# Patient Record
Sex: FEMALE | Race: BLACK OR AFRICAN AMERICAN | NOT HISPANIC OR LATINO | Employment: UNEMPLOYED
[De-identification: names, ages, dates, MRNs, and addresses within clinical notes are randomized per-mention and may not be internally consistent; named-entity substitution may affect disease eponyms.]

---

## 2017-06-24 ENCOUNTER — HEALTH MAINTENANCE LETTER (OUTPATIENT)
Age: 37
End: 2017-06-24

## 2017-08-23 ENCOUNTER — TRANSFERRED RECORDS (OUTPATIENT)
Dept: HEALTH INFORMATION MANAGEMENT | Facility: CLINIC | Age: 37
End: 2017-08-23

## 2017-10-17 ENCOUNTER — HOSPITAL ENCOUNTER (INPATIENT)
Facility: CLINIC | Age: 37
LOS: 3 days | Discharge: LEFT BEFORE DISCHARGE | End: 2017-10-21
Attending: EMERGENCY MEDICINE | Admitting: PSYCHIATRY & NEUROLOGY
Payer: MEDICAID

## 2017-10-17 DIAGNOSIS — T78.00XD ANAPHYLACTIC SHOCK DUE TO FOOD, SUBSEQUENT ENCOUNTER: Primary | ICD-10-CM

## 2017-10-17 DIAGNOSIS — F11.20 OPIOID USE DISORDER, SEVERE, DEPENDENCE (H): ICD-10-CM

## 2017-10-17 DIAGNOSIS — F11.23 OPIOID DEPENDENCE WITH WITHDRAWAL (H): ICD-10-CM

## 2017-10-17 DIAGNOSIS — F10.220 ACUTE ALCOHOLIC INTOXICATION IN ALCOHOLISM WITHOUT COMPLICATION (H): ICD-10-CM

## 2017-10-17 LAB
ALCOHOL BREATH TEST: 0.12 (ref 0–0.01)
BASOPHILS # BLD AUTO: 0 10E9/L (ref 0–0.2)
BASOPHILS NFR BLD AUTO: 0.2 %
DIFFERENTIAL METHOD BLD: ABNORMAL
EOSINOPHIL # BLD AUTO: 0 10E9/L (ref 0–0.7)
EOSINOPHIL NFR BLD AUTO: 0.2 %
ERYTHROCYTE [DISTWIDTH] IN BLOOD BY AUTOMATED COUNT: 17.3 % (ref 10–15)
HCT VFR BLD AUTO: 32.7 % (ref 35–47)
HGB BLD-MCNC: 10.5 G/DL (ref 11.7–15.7)
IMM GRANULOCYTES # BLD: 0 10E9/L (ref 0–0.4)
IMM GRANULOCYTES NFR BLD: 0.2 %
LYMPHOCYTES # BLD AUTO: 3.7 10E9/L (ref 0.8–5.3)
LYMPHOCYTES NFR BLD AUTO: 36 %
MCH RBC QN AUTO: 26.3 PG (ref 26.5–33)
MCHC RBC AUTO-ENTMCNC: 32.1 G/DL (ref 31.5–36.5)
MCV RBC AUTO: 82 FL (ref 78–100)
MONOCYTES # BLD AUTO: 0.6 10E9/L (ref 0–1.3)
MONOCYTES NFR BLD AUTO: 5.7 %
NEUTROPHILS # BLD AUTO: 6 10E9/L (ref 1.6–8.3)
NEUTROPHILS NFR BLD AUTO: 57.7 %
NRBC # BLD AUTO: 0 10*3/UL
NRBC BLD AUTO-RTO: 0 /100
PLATELET # BLD AUTO: 365 10E9/L (ref 150–450)
RBC # BLD AUTO: 3.99 10E12/L (ref 3.8–5.2)
WBC # BLD AUTO: 10.4 10E9/L (ref 4–11)

## 2017-10-17 PROCEDURE — 80053 COMPREHEN METABOLIC PANEL: CPT | Performed by: EMERGENCY MEDICINE

## 2017-10-17 PROCEDURE — 83690 ASSAY OF LIPASE: CPT | Performed by: EMERGENCY MEDICINE

## 2017-10-17 PROCEDURE — 96375 TX/PRO/DX INJ NEW DRUG ADDON: CPT | Performed by: EMERGENCY MEDICINE

## 2017-10-17 PROCEDURE — 25000128 H RX IP 250 OP 636: Performed by: EMERGENCY MEDICINE

## 2017-10-17 PROCEDURE — 85025 COMPLETE CBC W/AUTO DIFF WBC: CPT | Performed by: EMERGENCY MEDICINE

## 2017-10-17 PROCEDURE — 99285 EMERGENCY DEPT VISIT HI MDM: CPT | Mod: 25 | Performed by: EMERGENCY MEDICINE

## 2017-10-17 PROCEDURE — 96374 THER/PROPH/DIAG INJ IV PUSH: CPT | Performed by: EMERGENCY MEDICINE

## 2017-10-17 PROCEDURE — HZ2ZZZZ DETOXIFICATION SERVICES FOR SUBSTANCE ABUSE TREATMENT: ICD-10-PCS | Performed by: PSYCHIATRY & NEUROLOGY

## 2017-10-17 PROCEDURE — 99285 EMERGENCY DEPT VISIT HI MDM: CPT | Mod: Z6 | Performed by: EMERGENCY MEDICINE

## 2017-10-17 PROCEDURE — 82075 ASSAY OF BREATH ETHANOL: CPT | Performed by: EMERGENCY MEDICINE

## 2017-10-17 RX ORDER — KETOROLAC TROMETHAMINE 30 MG/ML
30 INJECTION, SOLUTION INTRAMUSCULAR; INTRAVENOUS ONCE
Status: COMPLETED | OUTPATIENT
Start: 2017-10-17 | End: 2017-10-17

## 2017-10-17 RX ORDER — DIAZEPAM 10 MG/2ML
5 INJECTION, SOLUTION INTRAMUSCULAR; INTRAVENOUS ONCE
Status: DISCONTINUED | OUTPATIENT
Start: 2017-10-17 | End: 2017-10-18

## 2017-10-17 RX ORDER — ONDANSETRON 2 MG/ML
4 INJECTION INTRAMUSCULAR; INTRAVENOUS ONCE
Status: COMPLETED | OUTPATIENT
Start: 2017-10-17 | End: 2017-10-17

## 2017-10-17 RX ADMIN — ONDANSETRON 4 MG: 2 INJECTION INTRAMUSCULAR; INTRAVENOUS at 23:36

## 2017-10-17 RX ADMIN — KETOROLAC TROMETHAMINE 30 MG: 30 INJECTION, SOLUTION INTRAMUSCULAR at 23:36

## 2017-10-18 PROBLEM — F19.20 CHEMICAL DEPENDENCY (H): Status: ACTIVE | Noted: 2017-10-18

## 2017-10-18 LAB
ALBUMIN SERPL-MCNC: 3.2 G/DL (ref 3.4–5)
ALP SERPL-CCNC: 58 U/L (ref 40–150)
ALT SERPL W P-5'-P-CCNC: 28 U/L (ref 0–50)
AMPHETAMINES UR QL SCN: NEGATIVE
ANION GAP SERPL CALCULATED.3IONS-SCNC: 8 MMOL/L (ref 3–14)
AST SERPL W P-5'-P-CCNC: 32 U/L (ref 0–45)
BARBITURATES UR QL: NEGATIVE
BENZODIAZ UR QL: NEGATIVE
BILIRUB SERPL-MCNC: 0.1 MG/DL (ref 0.2–1.3)
BUN SERPL-MCNC: 6 MG/DL (ref 7–30)
CALCIUM SERPL-MCNC: 8.6 MG/DL (ref 8.5–10.1)
CANNABINOIDS UR QL SCN: NEGATIVE
CHLORIDE SERPL-SCNC: 109 MMOL/L (ref 94–109)
CO2 SERPL-SCNC: 29 MMOL/L (ref 20–32)
COCAINE UR QL: POSITIVE
CREAT SERPL-MCNC: 0.72 MG/DL (ref 0.52–1.04)
ETHANOL UR QL SCN: POSITIVE
GFR SERPL CREATININE-BSD FRML MDRD: >90 ML/MIN/1.7M2
GLUCOSE SERPL-MCNC: 71 MG/DL (ref 70–99)
LIPASE SERPL-CCNC: 446 U/L (ref 73–393)
OPIATES UR QL SCN: POSITIVE
POTASSIUM SERPL-SCNC: 3.5 MMOL/L (ref 3.4–5.3)
PROT SERPL-MCNC: 6.8 G/DL (ref 6.8–8.8)
SODIUM SERPL-SCNC: 146 MMOL/L (ref 133–144)

## 2017-10-18 PROCEDURE — 99222 1ST HOSP IP/OBS MODERATE 55: CPT | Mod: AI | Performed by: PSYCHIATRY & NEUROLOGY

## 2017-10-18 PROCEDURE — 25000132 ZZH RX MED GY IP 250 OP 250 PS 637: Performed by: PSYCHIATRY & NEUROLOGY

## 2017-10-18 PROCEDURE — 99232 SBSQ HOSP IP/OBS MODERATE 35: CPT | Performed by: PHYSICIAN ASSISTANT

## 2017-10-18 PROCEDURE — 12800012 ZZH R&B CD MH INTERMEDIATE ADULT

## 2017-10-18 PROCEDURE — 80320 DRUG SCREEN QUANTALCOHOLS: CPT | Performed by: EMERGENCY MEDICINE

## 2017-10-18 PROCEDURE — 99207 ZZC CONSULT E&M CHANGED TO SUBSEQUENT LEVEL: CPT | Performed by: PHYSICIAN ASSISTANT

## 2017-10-18 PROCEDURE — 25000132 ZZH RX MED GY IP 250 OP 250 PS 637: Performed by: EMERGENCY MEDICINE

## 2017-10-18 PROCEDURE — 25000132 ZZH RX MED GY IP 250 OP 250 PS 637: Performed by: PHYSICIAN ASSISTANT

## 2017-10-18 PROCEDURE — 80307 DRUG TEST PRSMV CHEM ANLYZR: CPT | Performed by: EMERGENCY MEDICINE

## 2017-10-18 RX ORDER — AMLODIPINE BESYLATE 10 MG/1
10 TABLET ORAL DAILY
Status: DISCONTINUED | OUTPATIENT
Start: 2017-10-18 | End: 2017-10-22 | Stop reason: HOSPADM

## 2017-10-18 RX ORDER — NALOXONE HYDROCHLORIDE 0.4 MG/ML
.1-.4 INJECTION, SOLUTION INTRAMUSCULAR; INTRAVENOUS; SUBCUTANEOUS
Status: DISCONTINUED | OUTPATIENT
Start: 2017-10-18 | End: 2017-10-22 | Stop reason: HOSPADM

## 2017-10-18 RX ORDER — METOPROLOL TARTRATE 100 MG
100 TABLET ORAL 2 TIMES DAILY
Status: DISCONTINUED | OUTPATIENT
Start: 2017-10-18 | End: 2017-10-18

## 2017-10-18 RX ORDER — LABETALOL 100 MG/1
200 TABLET, FILM COATED ORAL EVERY 12 HOURS SCHEDULED
Status: DISCONTINUED | OUTPATIENT
Start: 2017-10-18 | End: 2017-10-18

## 2017-10-18 RX ORDER — ACETAMINOPHEN 500 MG
1000 TABLET ORAL EVERY 6 HOURS PRN
Status: DISCONTINUED | OUTPATIENT
Start: 2017-10-18 | End: 2017-10-22 | Stop reason: HOSPADM

## 2017-10-18 RX ORDER — HYDROXYZINE HYDROCHLORIDE 25 MG/1
25-50 TABLET, FILM COATED ORAL EVERY 4 HOURS PRN
Status: DISCONTINUED | OUTPATIENT
Start: 2017-10-18 | End: 2017-10-22 | Stop reason: HOSPADM

## 2017-10-18 RX ORDER — CLONIDINE HYDROCHLORIDE 0.1 MG/1
0.1 TABLET ORAL 3 TIMES DAILY
Status: DISCONTINUED | OUTPATIENT
Start: 2017-10-18 | End: 2017-10-19

## 2017-10-18 RX ORDER — BUPRENORPHINE 2 MG/1
2 TABLET SUBLINGUAL 4 TIMES DAILY PRN
Status: DISCONTINUED | OUTPATIENT
Start: 2017-10-18 | End: 2017-10-19

## 2017-10-18 RX ORDER — DIAZEPAM 5 MG
5-20 TABLET ORAL EVERY 30 MIN PRN
Status: DISCONTINUED | OUTPATIENT
Start: 2017-10-18 | End: 2017-10-22 | Stop reason: HOSPADM

## 2017-10-18 RX ORDER — MULTIPLE VITAMINS W/ MINERALS TAB 9MG-400MCG
1 TAB ORAL DAILY
Status: DISCONTINUED | OUTPATIENT
Start: 2017-10-18 | End: 2017-10-22 | Stop reason: HOSPADM

## 2017-10-18 RX ORDER — QUETIAPINE FUMARATE 50 MG/1
50 TABLET, EXTENDED RELEASE ORAL AT BEDTIME
Status: DISCONTINUED | OUTPATIENT
Start: 2017-10-18 | End: 2017-10-20 | Stop reason: ALTCHOICE

## 2017-10-18 RX ORDER — FOLIC ACID 1 MG/1
1 TABLET ORAL DAILY
Status: DISCONTINUED | OUTPATIENT
Start: 2017-10-18 | End: 2017-10-22 | Stop reason: HOSPADM

## 2017-10-18 RX ORDER — CLONIDINE HYDROCHLORIDE 0.1 MG/1
0.1 TABLET ORAL 2 TIMES DAILY
Status: DISCONTINUED | OUTPATIENT
Start: 2017-10-18 | End: 2017-10-18

## 2017-10-18 RX ORDER — NIFEDIPINE 30 MG/1
30 TABLET, EXTENDED RELEASE ORAL DAILY
Status: DISCONTINUED | OUTPATIENT
Start: 2017-10-18 | End: 2017-10-18

## 2017-10-18 RX ORDER — POLYETHYLENE GLYCOL 3350 17 G/17G
17 POWDER, FOR SOLUTION ORAL DAILY PRN
Status: DISCONTINUED | OUTPATIENT
Start: 2017-10-18 | End: 2017-10-22 | Stop reason: HOSPADM

## 2017-10-18 RX ORDER — LANOLIN ALCOHOL/MO/W.PET/CERES
100 CREAM (GRAM) TOPICAL DAILY
Status: COMPLETED | OUTPATIENT
Start: 2017-10-18 | End: 2017-10-20

## 2017-10-18 RX ADMIN — MULTIPLE VITAMINS W/ MINERALS TAB 1 TABLET: TAB at 08:24

## 2017-10-18 RX ADMIN — AMLODIPINE BESYLATE 10 MG: 10 TABLET ORAL at 12:51

## 2017-10-18 RX ADMIN — CLONIDINE HYDROCHLORIDE 0.1 MG: 0.1 TABLET ORAL at 17:13

## 2017-10-18 RX ADMIN — DIAZEPAM 10 MG: 5 TABLET ORAL at 08:24

## 2017-10-18 RX ADMIN — Medication 100 MG: at 08:24

## 2017-10-18 RX ADMIN — FOLIC ACID 1 MG: 1 TABLET ORAL at 08:24

## 2017-10-18 RX ADMIN — DIAZEPAM 10 MG: 5 TABLET ORAL at 04:48

## 2017-10-18 RX ADMIN — METOPROLOL TARTRATE 100 MG: 100 TABLET, FILM COATED ORAL at 08:24

## 2017-10-18 RX ADMIN — CLONIDINE HYDROCHLORIDE 0.1 MG: 0.1 TABLET ORAL at 20:31

## 2017-10-18 RX ADMIN — QUETIAPINE FUMARATE 50 MG: 50 TABLET, EXTENDED RELEASE ORAL at 20:52

## 2017-10-18 RX ADMIN — DIAZEPAM 10 MG: 5 TABLET ORAL at 12:51

## 2017-10-18 RX ADMIN — DIAZEPAM 10 MG: 5 TABLET ORAL at 16:36

## 2017-10-18 ASSESSMENT — ACTIVITIES OF DAILY LIVING (ADL)
ORAL_HYGIENE: INDEPENDENT
DRESS: SCRUBS (BEHAVIORAL HEALTH)
GROOMING: INDEPENDENT
DRESS: SCRUBS (BEHAVIORAL HEALTH)
GROOMING: INDEPENDENT
LAUNDRY: WITH SUPERVISION
ORAL_HYGIENE: INDEPENDENT
LAUNDRY: WITH SUPERVISION

## 2017-10-18 ASSESSMENT — ENCOUNTER SYMPTOMS
NAUSEA: 1
HEADACHES: 1
CHILLS: 0
RESPIRATORY NEGATIVE: 1
FEVER: 0

## 2017-10-18 NOTE — H&P
IDENTIFYING INFORMATION:  Elaina Lin is a 36-year-old -American female, homeless, unemployed.  She has 5 children in foster care.      CHIEF COMPLAINT:  Heroin.      HISTORY OF PRESENT ILLNESS:  The patient is an extremely poor historian, falls asleep, made 2 attempts to talk with her but she is not able to provide.  She gets irritable when asked for details.  The patient came to the emergency room wanting help.  She has been using alcohol and heroin.  She has been saying this is a 10-year history.  She has tolerance to both of them, withdrawal from them, but no history of DTs or seizures.  Progressive use with loss of control, use despite negative consequences, health, money.      The patient has bipolar.  When she gets manic she cannot sleep.  She does not have any suicidal ideation.  She does not have any auditory or visual hallucinations, does not have any psychosis.      PAST PSYCHIATRIC HISTORY:  Psychiatrically hospitalized once in 2009 after a suicide attempt.  This was hospitalization in [facility].  Patient was in 2 chemical dependency treatments.  In 2014 was ARF.  The patient has previously been on Seroquel and Lexapro.      Vitals are as below.  Temperature of 98.4, pulse of 83, respiratory rate of 16, blood pressure 164/713.      REVIEW OF SYSTEMS:  Unable to do 10-point review of systems as the patient is very sedated.      FAMILY HISTORY:  Mother has schizophrenia.      SOCIAL HISTORY:  Homeless, unemployed.      MENTAL STATUS EXAMINATION:  The patient is a 36-year-old -American female lying in bed, poor historian, met with her twice but not able to provide, very sleepy.  She has poor grooming, poor hygiene, poor eye contact.  Psychomotor retardation seen.  Mood is irritable.  Affect is congruent.  Speech is less spontaneous, reduced in volume, less logical in thinking, no loose association.  Insight and judgment are limited.  Does not have any suicidal or homicidal ideation,  denied auditory hallucinations.  Alert and oriented x3 but recent and remote memory, language, fund of knowledge are less than adequate.      DIAGNOSES:   Axis I:  Alcohol use disorder, severe.    Opiate use disorder, severe.      PLAN:  The patient will be detoxed off alcohol using MSSA protocol on Valium.  The patient will be detoxed off opiates using buprenorphine.  Will be started on Seroquel 50 mg at bedtime.  The patient at this time does not have any suicidal or homicidal ideation, plan or intent.  She does not know what she wants to do for treatment.  She will be seen by case management and Internal Medicine.         CARINA RUIZ MD             D: 10/18/2017 10:57   T: 10/18/2017 11:13   MT: MAIA      Name:     RENATE MENEZES   MRN:      [id number]        Account:      [id number]   :      1980           Admitted:     [id number]      Document: [id number]

## 2017-10-18 NOTE — PROGRESS NOTES
BIN # 1: Purse with personal stuff, Back pack with clothing, loose clothing and shoes    BIN #2: winter coat, underwear, socks, 2 t-shirts, jeans, boxer shorts, white belt, bag with hair products and tampax and pads, purse, Ziploc with scissors     LOCKED DRAWER:  Phone and chargers    SECURITY ENV # [id number]  -  2 [place] Drivers License    SECURITY ENV # [id number]- meds    A               Admission:  I am responsible for any personal items that are not sent to the safe or pharmacy.  [facility] is not responsible for loss, theft or damage of any property in my possession.    Signature:  _________________________________ Date: _______  Time: _____                                              Staff Signature:  ____________________________ Date: ________  Time: _____      2nd Staff person, if patient is unable/unwilling to sign:    Signature: ________________________________ Date: ________  Time: _____     Discharge:  [facility] has returned all of my personal belongings:    Signature: _________________________________ Date: ________  Time: _____                                          Staff Signature:  ____________________________ Date: ________  Time: _____

## 2017-10-18 NOTE — CONSULTS
Internal Medicine Initial Visit    Elaina Lin MRN# [id number]   Age: 36 year old YOB: 1980   Date of Admission: 10/17/2017     Reason for consult: Co-Management- elevated BP       Requesting physician Dr. Castro      SUBJECTIVE   HPI:   Elaina Lin is a 36 year old female with history of polysubstance abuse, alcohol abuse, pancreatitis, and HTN admitted to station  3A for detox from alcohol and heroin.    Per EMR review - Currently drinking 1/2L of vodka daily in addition to 1g IV heroin daily with occasional use of pills when there is no access to heroin. She has a history of pancreatitis, but lipase is almost normal today. She has been non-compliant with her HTN medications PTA.    Patient declined my visit, unable to complete ROS or PE.     Past Medical History:     Past Medical History:   Diagnosis Date     Genital herpes 2012     IMO update changed this record. Please review for accuracy     H/O: substance abuse 2013     Postpartum depression 10/21/2011     Recur major depress, severe 2012     Substance abuse         Past Surgical History:      Past Surgical History:   Procedure Laterality Date      SECTION  2011 and 13     HERNIA REPAIR       REPAIR TENDON HAND             Medications prior to admission:     Prior to Admission Medications   Prescriptions Last Dose Informant Patient Reported? Taking?   BusPIRone HCl (BUSPAR PO) More than a month at Unknown time  Yes No   Sig: Patient unsure of dosage   Divalproex Sodium (DEPAKOTE PO) More than a month at Unknown time  Yes No   Si mg in the AM and 500 mg at night   NIFEdipine osmotic (PROCARDIA XL) 30 MG 24 hr tablet More than a month at Unknown time  No No   Sig: Take 1 tablet (30 mg) by mouth daily   QUEtiapine (SEROQUEL) 100 MG tablet More than a month at Unknown time  Yes No   Sig: Take by mouth At Bedtime   medroxyPROGESTERone (DEPO-PROVERA) 150 MG/ML injection   No No   Sig: Inject 1 mL (150  "mg) into the muscle every 3 months   metoprolol (LOPRESSOR) 100 MG tablet More than a month at Unknown time  No No   Sig: Take 1 tablet (100 mg) by mouth 2 times daily   metroNIDAZOLE (FLAGYL) 500 MG tablet Unknown at Unknown time  No No   Sig: Take 1 tablet (500 mg) by mouth 2 times daily      Facility-Administered Medications: None         Allergies:     Allergies   Allergen Reactions     Compazine [Prochlorperazine]          Social History:   Did not discuss.     Family History:     Family History   Problem Relation Age of Onset     Asthma Mother      DIABETES Mother      Allergies Mother      Psychotic Disorder Mother      schitzophrenic     DIABETES Maternal Grandmother      HEART DISEASE Maternal Grandmother      triple bypass     Eye Disorder Maternal Grandmother      cataracts     Hypertension Maternal Grandmother      Unknown/Adopted Maternal Grandfather      Unknown/Adopted Paternal Grandmother      Unknown/Adopted Paternal Grandfather      CANCER Sister      leukemia        Reviewed & updated in Epic.     Review of Systems:   Ten point review of systems negative except as stated above in History of Present Illness.    OBJECTIVE   Physical Exam:   Blood pressure (!) 164/113, pulse 83, temperature 98.4  F (36.9  C), temperature source Tympanic, resp. rate 16, height 1.575 m (5' 2\"), weight 61.2 kg (135 lb), last menstrual period 10/17/2017, SpO2 96 %, not currently breastfeeding.  General: Sleepy, agitated. NAD.   Refused exam.     Laboratory Data:   CMP  Recent Labs  Lab 10/17/17  2326   *   POTASSIUM 3.5   CHLORIDE 109   CO2 29   ANIONGAP 8   GLC 71   BUN 6*   CR 0.72   GFRESTIMATED >90   GFRESTBLACK >90   JOHANNY 8.6   PROTTOTAL 6.8   ALBUMIN 3.2*   BILITOTAL 0.1*   ALKPHOS 58   AST 32   ALT 28       CBC  Recent Labs  Lab 10/17/17  2326   WBC 10.4   RBC 3.99   HGB 10.5*   HCT 32.7*   MCV 82   MCH 26.3*   MCHC 32.1   RDW 17.3*          TSHNo lab results found in last 7 days.       Assessment and " Plan/Recommendations:     Polysubstance Abuse, Alcohol Abuse, Withdrawal. Per EMR review, using IV heroin or narcotic pills, and alcohol daily.   - Continue MSSA protocol  - Continue MVI, thiamine folate  - Most recent Hep and HIV testing in late September was negative, consider repeating - will defer to Psych    HTN. Non-compliant with PTA meds per report from RN. BP elevated in setting of non-compliance and acute withdrawal. Labetalol is not recommended in patients with cocaine use, which per RN, patient uses on a regular basis.  - Continue PTA Norvasc 10mg daily  - D/c labetolol since patient using cocaine  - Clonidine 0.1mg BID in setting of HTN and withdrawal  - Monitor BP closely     Medicine will follow BP peripherally. Please page the Internal Medicine job code pager for any intercurrent medical issues which arise. Thank you for the opportunity to be a part of this patient's care.    Rolf Ahumada PA-C  Hospitalist Service  [phone]

## 2017-10-18 NOTE — PROGRESS NOTES
"CLINICAL NUTRITION SERVICES - ASSESSMENT NOTE     Nutrition Prescription    RECOMMENDATIONS FOR MDs/PROVIDERS TO ORDER:  None today    Malnutrition Status:    Unable to determine    Recommendations already ordered by Registered Dietitian (RD):  Weekly weight checks    Future/Additional Recommendations:  If pt continues to lose weight/PO intakes do not improve, order nutrition supplement       REASON FOR ASSESSMENT  Elaina Lin is a/an 36 year old female assessed by the dietitian for Admission Nutrition Risk Screen for unintentional loss of 10# or more in the past two months    NUTRITION HISTORY  - Pt was sleeping x2 attempts and was unable to be woken up.  - Per chart review, she has been drinking 1/2 liter of vodka a day.    CURRENT NUTRITION ORDERS  Diet: Regular  Intake/Tolerance: per unit staff, pt was present at meals but amounts eaten is unknown    LABS  Labs reviewed    MEDICATIONS  Medications reviewed    ANTHROPOMETRICS  Height: 157.5 cm (5' 2\")  Most Recent Weight: 61.2 kg (135 lb)    IBW: 50 kg (122% IBW)  BMI: Normal BMI  Weight History: pt has lost 9 lbs (6%) over the last 2 months.   Wt Readings from Last 10 Encounters:  10/18/17 : 61.2 kg (135 lb)  10/01/17 : 61.2 kg (135 lb)   -- OSH  08/07/17 : 65.3 kg (144 lb)   -- OSH  01/25/17 : 63 kg (139 lb)  -- OSH  12/19/16 : 66.7 kg (147 lb)   -- OSH    Dosing Weight: 53 kg - adjusted from most recent wt    ASSESSED NUTRITION NEEDS  Estimated Energy Needs: 7186-2674 kcals/day (25 - 30 kcals/kg)  Justification: Maintenance  Estimated Protein Needs: 53-64 grams protein/day (1 - 1.2 grams of pro/kg)  Justification: Maintenance  Estimated Fluid Needs: 1 mL/kcal  Justification: Maintenance    PHYSICAL FINDINGS  See malnutrition section below.    MALNUTRITION  % Intake: Unable to assess   % Weight Loss: Up to 7.5% in 3 months (non-severe)  Subcutaneous Fat Loss: Unable to assess - pt covered with blanket  Muscle Loss: Unable to assess - pt covered with " blanket  Fluid Accumulation/Edema: None noted in chart  Malnutrition Diagnosis: Unable to determine    NUTRITION DIAGNOSIS  Inadequate oral intake related to variable appetite 2/2 polysubstance abuse as evidenced by pt had a 6% wt loss over the last 2 months    INTERVENTIONS  Implementation  Nutrition Education: Not appropriate at this time due to patient condition     Goals  Patient to consume % of nutritionally adequate meal trays TID, or the equivalent with supplements/snacks.     Monitoring/Evaluation  Progress toward goals will be monitored and evaluated per protocol.      Sharon Hollins RD, LD  Unit Pager: [phone]

## 2017-10-18 NOTE — PROGRESS NOTES
Met with Pt to initiate discharge planning.  Pt had a recent Rule 25 at [facility].  PT signed OLYA and request for Rule 25 was faxed.  Pt will require [facility] funding for treatment.

## 2017-10-18 NOTE — PLAN OF CARE
Problem: Substance Withdrawal  Goal: Substance Withdrawal  Signs and symptoms of listed problems will be absent or manageable. 1) Patient will achieve medical stabilization of acute withdrawal sx.  2) Patient will remain safe and free from injury  3) Patient will demonstrate improvement of ADLs (appetite, hygiene)  4) Verbalize reduction of fear or anxiety to a manageable level.  5) Verbalize knowledge of substance abuse as a disease  6) Verbalize risks and negative effects related to drug ingestion  7) Demonstrate participation in unit programming and attends specific substance use group therapy (i.e AA meetings)  8) Accept referral to substance abuse treatment  9) Express sense of regaining some control of situation/life (possible by verbalizing alternative coping mechanisms as alternatives to substance use in response to stress)  Outcome: Declining  Pt being monitored for opiate and alcohol withdrawal. Pt  Has been in bed much of day shift. Pt out for meals and vital signs only. Affect flat. Mood is irritable. Pt marginally cooperative with detox assessments. Pt medicated x 2 with valium 10 mg for alcohol withdrawal. See order to start buprenorphine for opiate withdrawal. Pt states she last used opiates yesterday at 2100.   BP elevated. Pt reports her BP medications are different than what was ordered. Medical aware of BP and Medication issue. See new medication orders. Pt states she had a Rule 25 done at [facility] a few months ago. States she wants to attend a treatment program that would have a bed asap.   Pt reports she was using cocaine on a regular basis.

## 2017-10-18 NOTE — PLAN OF CARE
Problem: Substance Withdrawal  Goal: Substance Withdrawal  Signs and symptoms of listed problems will be absent or manageable.   Outcome: No Change  Pt admitted voluntarily to Stn 3A seeking detoxification from alcohol, and possible CD treatment thereafter. Reports this is her first admission here, reports several failed CD treatments. Drug of choice are alcohol and heroin which she uses daily, with last use several hours prior to presentation in the ED. Medical concerns of HTN and pancreatitis. Denies hx of seizures/TD's with withdrawals. Hx of one SA via overdose, denies current suicidal ideation and has verbally contracted for safety on the unit. Pt  identifying stressors as;  in [facility], kids in foster care, and homelessness.      Pt presents as flat and blunted with a tense affect. Fair insight into chem dep concerns. Mood irritable, noted to be restless, fidgety and tremulous. Had one medium emesis during admission interview. MSSA upon arrival to unit 12, pt  medicated with 10 mg of valium. Pt briefly oriented to unit, and room. Currently in bed. Nursing will continue to monitor. Admission profiles needs to be completed and PTA medications reconciled and ordered.

## 2017-10-18 NOTE — ED NOTES
Patient offered Tylenol per MD and patient declined and stated she wanted something else. Told that she can't have any stronger pain med due to being under the influence of alcohol. Stated that she then wanted to leave. MD notified.

## 2017-10-18 NOTE — PLAN OF CARE
Problem: General Plan of Care (Inpatient Behavioral)  Goal: Individualization/Patient Specific Goal (IP Behavioral)  The patient and/or their representative will achieve their patient-specific goals related to the plan of care.    The patient-specific goals include:   Illness Management Recovery model: Objectives    Patient will identify reason(s) for hospitalization from their perspective.  Patient will identify a minimum of three goals for discharge.  Patient will identify a minimum of three triggers that may increase their symptoms.  Patient will identify a minimum of three coping skills they can do to stay well.   Patient will identify their support system to demonstrate readiness for discharge.  Outcome: No Change  Illness Management Recovery model: Objectives     Patient will identify reason(s) for hospitalization from their perspective.  Patient will identify a minimum of three goals for discharge.  Patient will identify a minimum of three triggers that may increase their symptoms.  Patient will identify a minimum of three coping skills they can do to stay well.  Patient will identify their support system to demonstrate readiness for discharge.

## 2017-10-18 NOTE — ED PROVIDER NOTES
History     Chief Complaint   Patient presents with     Addiction Problem     HPI  Elaina Lin is a 36 year old female presenting seeking detox from alcohol and heroin. She states that she has a long history of substance abuse. She drinks daily and in general drinking 1/2 liter of vodka a day. Her last drink was at approximately 5 PM today.  On arrival here to the Emergency Department she has a breathalyzer of 0.121. In addition she states that she has a history of chronic opiate use. She uses pills if she does not have access to heroine. She says in general she uses about a gram of heroin at a time and sometimes will take multiple doses in a day. Her last use of this was around sometime in the middle of the day today. Her medical history includes hypertension and pancreatitis. Care Everywhere was reviewed and the last time that she was evaluated was 10/01/2017 at [facility]. She states that she has not been recently compliant with her antihypertensive medications. Arrival here today her blood pressure 155/107.     This part of the document was transcribed by [facility] [employer].     I have reviewed the Medications, Allergies, Past Medical and Surgical History, and Social History in the [employer] system.    Past Medical History:   Diagnosis Date     Genital herpes 2012     IMO update changed this record. Please review for accuracy     H/O: substance abuse 2013     Postpartum depression 10/21/2011     Recur major depress, severe 2012     Substance abuse        Past Surgical History:   Procedure Laterality Date      SECTION  2011 and 13     HERNIA REPAIR       REPAIR TENDON HAND         Family History   Problem Relation Age of Onset     Asthma Mother      DIABETES Mother      Allergies Mother      Psychotic Disorder Mother      schitzophrenic     DIABETES Maternal Grandmother      HEART DISEASE Maternal Grandmother      triple bypass     Eye Disorder Maternal Grandmother       cataracts     Hypertension Maternal Grandmother      Unknown/Adopted Maternal Grandfather      Unknown/Adopted Paternal Grandmother      Unknown/Adopted Paternal Grandfather      CANCER Sister      leukemia       Social History   Substance Use Topics     Smoking status: Current Every Day Smoker     Packs/day: 1.00     Types: Cigarettes     Smokeless tobacco: Never Used      Comment: about 1 cigarettes per day     Alcohol use Yes      Comment: 1 liter vodka per day       No current facility-administered medications for this encounter.      Current Outpatient Prescriptions   Medication     BusPIRone HCl (BUSPAR PO)     metoprolol (LOPRESSOR) 100 MG tablet     NIFEdipine osmotic (PROCARDIA XL) 30 MG 24 hr tablet     metroNIDAZOLE (FLAGYL) 500 MG tablet     Divalproex Sodium (DEPAKOTE PO)     QUEtiapine (SEROQUEL) 100 MG tablet     medroxyPROGESTERone (DEPO-PROVERA) 150 MG/ML injection          Allergies   Allergen Reactions     Compazine [Prochlorperazine]      Review of Systems   Constitutional: Negative for chills and fever.   Respiratory: Negative.    Gastrointestinal: Positive for nausea.   Skin: Negative.    Neurological: Positive for headaches.   All other systems reviewed and are negative.      Physical Exam   BP: (!) 155/107  Heart Rate: 90  Temp: 97.5  F (36.4  C)  Resp: 16  Weight: 61.2 kg (135 lb)  SpO2: 97 %       Physical Exam   Gen:A&Ox3, no acute distress  HEENT:PERRL, no facial tenderness or wounds, head atraumatic, mucus membranes moist  Back: no CVA tenderness  CV:RRR without murmurs  PULM:Clear to auscultation bilaterally  Abd:soft, minimal epigastric tenderness, nondistended. Bowel sounds present and normal  UE:No traumatic injuries, skin normal  LE:no traumatic injuries, skin normal, no LE edema.   Neuro:CN II-XII intact, strength 5/5 throughout  Skin: no rashes or ecchymoses    ED Course     ED Course     Procedures             Critical Care time:  none             Labs Ordered and Resulted  from Time of ED Arrival Up to the Time of Departure from the ED   CBC WITH PLATELETS DIFFERENTIAL - Abnormal; Notable for the following:        Result Value    Hemoglobin 10.5 (*)     Hematocrit 32.7 (*)     MCH 26.3 (*)     RDW 17.3 (*)     All other components within normal limits   COMPREHENSIVE METABOLIC PANEL - Abnormal; Notable for the following:     Sodium 146 (*)     Urea Nitrogen 6 (*)     Bilirubin Total 0.1 (*)     Albumin 3.2 (*)     All other components within normal limits   LIPASE - Abnormal; Notable for the following:     Lipase 446 (*)     All other components within normal limits   ALCOHOL BREATH TEST POCT - Abnormal; Notable for the following:     Alcohol Breath Test 0.121 (*)     All other components within normal limits   DRUG ABUSE SCREEN 6 CHEM DEP URINE ([facility])   PERIPHERAL IV CATHETER         Assessments & Plan (with Medical Decision Making)   36-year old female with a history of polysubstance abuse who presents with seeking detox for  alcohol and heroin. She arrives to the Emergency Department with mild tachycardia with a blood pressure of 155/107. Temperature, heart rate, and respiratory rate were within normal limits. Breathalyzer on arrival 0.121. She reports some mild withdrawal symptoms is not noted to be tremulous or tachycardic. Given her history of alcoholic pancreatitis and nausea, laboratory testing was sent. CBC with a WBC count of 10.4, hemoglobin 10.6 and stable platelet 365. CMP unremarkable. Lipase 446. She is treated with 4 mg IV Zofran and 30 mg IV Toradol for headache. Her case was discussed with Chemical Dependency intake and to be admitted to the detox unit for further care.    Denies hx of withdrawal seizure or DTs.   No active SI or other mental health issues require stabilization.   Medically cleared for detox.     I have reviewed the nursing notes.    I have reviewed the findings, diagnosis, plan and need for follow up with the patient.    New Prescriptions    No  medications on file       Final diagnoses:   Acute alcoholic intoxication in alcoholism without complication (H)   Opioid dependence with withdrawal (H)       10/17/2017   [facility], [place], EMERGENCY DEPARTMENT    MD MARCO Hayes Katrina Anne, MD  10/19/17 0240

## 2017-10-19 PROCEDURE — 99232 SBSQ HOSP IP/OBS MODERATE 35: CPT | Performed by: PSYCHIATRY & NEUROLOGY

## 2017-10-19 PROCEDURE — 25000125 ZZHC RX 250: Performed by: PSYCHIATRY & NEUROLOGY

## 2017-10-19 PROCEDURE — 99207 ZZC CDG-MDM COMPONENT: MEETS MODERATE - UP CODED: CPT | Performed by: PSYCHIATRY & NEUROLOGY

## 2017-10-19 PROCEDURE — 25000132 ZZH RX MED GY IP 250 OP 250 PS 637: Performed by: EMERGENCY MEDICINE

## 2017-10-19 PROCEDURE — 12800012 ZZH R&B CD MH INTERMEDIATE ADULT

## 2017-10-19 PROCEDURE — 25000132 ZZH RX MED GY IP 250 OP 250 PS 637: Performed by: PSYCHIATRY & NEUROLOGY

## 2017-10-19 PROCEDURE — 25000132 ZZH RX MED GY IP 250 OP 250 PS 637: Performed by: PHYSICIAN ASSISTANT

## 2017-10-19 RX ORDER — EPINEPHRINE 0.3 MG/.3ML
0.3 INJECTION SUBCUTANEOUS
Status: DISCONTINUED | OUTPATIENT
Start: 2017-10-19 | End: 2017-10-22 | Stop reason: HOSPADM

## 2017-10-19 RX ORDER — CLONIDINE HYDROCHLORIDE 0.1 MG/1
0.1 TABLET ORAL 2 TIMES DAILY
Status: DISCONTINUED | OUTPATIENT
Start: 2017-10-19 | End: 2017-10-20

## 2017-10-19 RX ORDER — EPINEPHRINE 0.3 MG/.3ML
0.3 INJECTION SUBCUTANEOUS ONCE
Status: DISCONTINUED | OUTPATIENT
Start: 2017-10-19 | End: 2017-10-19

## 2017-10-19 RX ORDER — BUPRENORPHINE 2 MG/1
2 TABLET SUBLINGUAL 4 TIMES DAILY
Status: DISCONTINUED | OUTPATIENT
Start: 2017-10-19 | End: 2017-10-22 | Stop reason: HOSPADM

## 2017-10-19 RX ORDER — ONDANSETRON 4 MG/1
4 TABLET, ORALLY DISINTEGRATING ORAL EVERY 6 HOURS PRN
Status: DISCONTINUED | OUTPATIENT
Start: 2017-10-19 | End: 2017-10-22 | Stop reason: HOSPADM

## 2017-10-19 RX ADMIN — AMLODIPINE BESYLATE 10 MG: 10 TABLET ORAL at 08:59

## 2017-10-19 RX ADMIN — DIAZEPAM 5 MG: 5 TABLET ORAL at 00:45

## 2017-10-19 RX ADMIN — Medication 100 MG: at 08:59

## 2017-10-19 RX ADMIN — BUPRENORPHINE HCL 2 MG: 2 TABLET SUBLINGUAL at 13:10

## 2017-10-19 RX ADMIN — ONDANSETRON 4 MG: 4 TABLET, ORALLY DISINTEGRATING ORAL at 12:05

## 2017-10-19 RX ADMIN — BUPRENORPHINE HCL 2 MG: 2 TABLET SUBLINGUAL at 08:59

## 2017-10-19 RX ADMIN — CLONIDINE HYDROCHLORIDE 0.1 MG: 0.1 TABLET ORAL at 21:07

## 2017-10-19 RX ADMIN — MULTIPLE VITAMINS W/ MINERALS TAB 1 TABLET: TAB at 08:59

## 2017-10-19 RX ADMIN — BUPRENORPHINE HCL 2 MG: 2 TABLET SUBLINGUAL at 16:29

## 2017-10-19 RX ADMIN — BUPRENORPHINE HCL 2 MG: 2 TABLET SUBLINGUAL at 21:07

## 2017-10-19 RX ADMIN — ACETAMINOPHEN 1000 MG: 500 TABLET ORAL at 17:07

## 2017-10-19 RX ADMIN — FOLIC ACID 1 MG: 1 TABLET ORAL at 08:59

## 2017-10-19 RX ADMIN — CLONIDINE HYDROCHLORIDE 0.1 MG: 0.1 TABLET ORAL at 08:59

## 2017-10-19 ASSESSMENT — ACTIVITIES OF DAILY LIVING (ADL)
DRESS: SCRUBS (BEHAVIORAL HEALTH)
DRESS: SCRUBS (BEHAVIORAL HEALTH)
ORAL_HYGIENE: INDEPENDENT
GROOMING: INDEPENDENT
GROOMING: INDEPENDENT
ORAL_HYGIENE: INDEPENDENT
LAUNDRY: WITH SUPERVISION
LAUNDRY: WITH SUPERVISION

## 2017-10-19 NOTE — PROGRESS NOTES
"[facility], [place]   Psychiatric Progress Note    Interim history   This is a 36 year old female with   Alcohol use disorder, severe.                         Opiate use disorder, severe. .Pt seen in rounds. Met with patient twice, both times has been minimally coperative  Pt being monitored for opiate and alcohol withdrawal.   Pt  Has been in bed much of day shift. Pt out for meals and vital signs only. Pt marginally cooperative with detox assessments. Pt medicated x 2 with valium 10 mg for alcohol withdrawal, started subutex for opiate withdrawal as well  Patient's mood is down  Energy Level:LOW  Sleep:sleeping too much   Appetite:fair motivation interest low   denied any Suicidal/homicidal ideation/plan intent.  deneid any psychosis    Pt is in detox  Pt mssa/cows score are monitered  Tolerating meds and has no side effects.              Medications:     Current Facility-Administered Medications   Medication     cloNIDine (CATAPRES) tablet 0.1 mg     buprenorphine (SUBUTEX) sublingual tablet 2 mg     hydrOXYzine (ATARAX) tablet 25-50 mg     acetaminophen (TYLENOL) tablet 1,000 mg     diazepam (VALIUM) tablet 5-20 mg     thiamine tablet 100 mg     folic acid (FOLVITE) tablet 1 mg     multivitamin, therapeutic with minerals (THERA-VIT-M) tablet 1 tablet     amLODIPine (NORVASC) tablet 10 mg     QUEtiapine (SEROquel XR) 24 hr tablet 50 mg     naloxone (NARCAN) injection 0.1-0.4 mg     polyethylene glycol (MIRALAX/GLYCOLAX) Packet 17 g             Allergies:     Allergies   Allergen Reactions     Compazine [Prochlorperazine]             Psychiatric Examination:   Blood pressure (!) 147/103, pulse 81, temperature 97.7  F (36.5  C), temperature source Oral, resp. rate 16, height 1.575 m (5' 2\"), weight 59.9 kg (132 lb), last menstrual period 10/17/2017, SpO2 96 %, not currently breastfeeding.  Weight is 132 lbs 0 oz  Body mass index is 24.14 kg/(m^2).    Appearance:  awake, alert and " "adequately groomed  Attitude:  cooperative  Eye Contact:  poor   Mood:  sad   Affect:  flat  Speech:  paucity of speech and mumbling rate /rhythm are low  Psychomotor Behavior:  physical retardation  Throught Process:  illogical  Associations:  no loose associations  Thought Content:  no evidence of suicidal ideation or homicidal ideation, no evidence of psychotic thought, no auditory hallucinations present and no visual hallucinations present  Insight:  limited  Judgement:  limited  Oriented to:  time, person, and place  Attention Span and Concentration:  poor  Recent and Remote Memory:  poor  Language fund of knowledge are adequate         Labs:     No results found for: NTBNPI, NTBNP  Lab Results   Component Value Date    WBC 10.4 10/17/2017    HGB 10.5 (L) 10/17/2017    HCT 32.7 (L) 10/17/2017    MCV 82 10/17/2017     10/17/2017     No results found for: TSH         Alcohol use disorder, severe.                         Opiate use disorder, severe.       PLAN:  The patient will be detoxed off alcohol using MSSA protocol on Valium.  The patient will be detoxed off opiates using buprenorphine.  Will be started on Seroquel 50 mg at bedtime.  The patient at this time does not have any suicidal or homicidal ideation, plan or intent.  She does not know what she wants to do for treatment.  She will be seen by case management and Internal Medicine.     Laboratory/Imaging: reviewed with patient   Consults: internal medicine consult reviewed  Patient will be treated in therapeutic milieu with appropriate individual and group therapies as described.      Seen by medicine  \"HTN. Non-compliant with PTA meds per report from RN. BP elevated in setting of non-compliance and acute withdrawal. Labetalol is not recommended in patients with cocaine use, which per RN, patient uses on a regular basis.  - Continue PTA Norvasc 10mg daily  - D/c labetolol since patient using cocaine  - Clonidine 0.1mg BID in setting of HTN and " "withdrawal\"    Legal Status: voluntary    Safety Assessment:   Checks:  15 min  Precautions: withdrawal precautions  Pt has not required locked seclusion or restraints in the past 24 hours to maintain safety, please refer to RN documentation for further details.  Discussed with patient many issues of addiction,triggers, relapse, and establishing a solid recovery program.  Able to give informed consent:  YES   Discussed Risks/Benefits/Side Effects/Alternatives: YES    After discussion of the indications, risks, benefits, alternatives and consequences of no treatment, the patient elects to complete detox and do trt  Pt will be transfered to DR JOY, PT INFORMED  "

## 2017-10-19 NOTE — PROGRESS NOTES
"Behaviors:  Patient is agitated and making multiple requests. Hanging on the Nurse's Desk and is difficult to redirect. She is picking at minutia to argue about. She swears at staff. \"You are all being fucking rude to me...\" She makes multiple requests to \"go through my stuff...\" Procedure and unit policy reviewed with patient and restricted items. She is irritable and angry. Demanding a different meal tray. When Psych Assoc was making attempts to correct the issue with Dietary and order a new tray for patient patient again started arguing and swearing. \"What a Bitch...\" Patient cued to please calm down and that staff was trying to order what she wanted. Patient trying to draw other patients into her issues. New tray will be sent for patient. Patient remains with labile mood. Continues with multiple complaints.     Patient approached me and voiced request to leave. I directed her back to her Nurse.   "

## 2017-10-19 NOTE — PLAN OF CARE
Problem: Substance Withdrawal  Goal: Substance Withdrawal  Signs and symptoms of listed problems will be absent or manageable. 1) Patient will achieve medical stabilization of acute withdrawal sx.  2) Patient will remain safe and free from injury  3) Patient will demonstrate improvement of ADLs (appetite, hygiene)  4) Verbalize reduction of fear or anxiety to a manageable level.  5) Verbalize knowledge of substance abuse as a disease  6) Verbalize risks and negative effects related to drug ingestion  7) Demonstrate participation in unit programming and attends specific substance use group therapy (i.e AA meetings)  8) Accept referral to substance abuse treatment  9) Express sense of regaining some control of situation/life (possible by verbalizing alternative coping mechanisms as alternatives to substance use in response to stress)   Outcome: Improving  Pt received first dose of buprenorphine. Pt reports she is feeling much better and has few opiate withdrawal symptoms. Pt has been out on unit today. Pt stated she had a court issue today. ERIC Campoverde aware. Pt needs epi pen for anaphylaxis of tomatoes. Pt cooperated to complete admission process. Pt working on assessment paperwork. Discharge plans pending

## 2017-10-20 PROBLEM — I10 ESSENTIAL HYPERTENSION: Status: ACTIVE | Noted: 2017-10-20

## 2017-10-20 PROBLEM — F10.20 ALCOHOL USE DISORDER, SEVERE, DEPENDENCE (H): Status: ACTIVE | Noted: 2017-10-20

## 2017-10-20 PROBLEM — F11.20 OPIOID USE DISORDER, SEVERE, DEPENDENCE (H): Status: ACTIVE | Noted: 2017-10-18

## 2017-10-20 PROBLEM — F10.239 ALCOHOL DEPENDENCE WITH WITHDRAWAL WITH COMPLICATION (H): Status: ACTIVE | Noted: 2017-10-20

## 2017-10-20 PROBLEM — F11.23 OPIOID DEPENDENCE WITH WITHDRAWAL (H): Status: ACTIVE | Noted: 2017-10-20

## 2017-10-20 LAB
HCV AB SERPL QL IA: NONREACTIVE
HIV 1+2 AB+HIV1 P24 AG SERPL QL IA: NONREACTIVE

## 2017-10-20 PROCEDURE — 87389 HIV-1 AG W/HIV-1&-2 AB AG IA: CPT | Performed by: PSYCHIATRY & NEUROLOGY

## 2017-10-20 PROCEDURE — 99232 SBSQ HOSP IP/OBS MODERATE 35: CPT | Performed by: PSYCHIATRY & NEUROLOGY

## 2017-10-20 PROCEDURE — 12800012 ZZH R&B CD MH INTERMEDIATE ADULT

## 2017-10-20 PROCEDURE — 25000132 ZZH RX MED GY IP 250 OP 250 PS 637: Performed by: PSYCHIATRY & NEUROLOGY

## 2017-10-20 PROCEDURE — 36415 COLL VENOUS BLD VENIPUNCTURE: CPT | Performed by: PSYCHIATRY & NEUROLOGY

## 2017-10-20 PROCEDURE — 25000132 ZZH RX MED GY IP 250 OP 250 PS 637: Performed by: EMERGENCY MEDICINE

## 2017-10-20 PROCEDURE — 25000132 ZZH RX MED GY IP 250 OP 250 PS 637: Performed by: PHYSICIAN ASSISTANT

## 2017-10-20 PROCEDURE — 25000125 ZZHC RX 250: Performed by: PSYCHIATRY & NEUROLOGY

## 2017-10-20 PROCEDURE — 86803 HEPATITIS C AB TEST: CPT | Performed by: PSYCHIATRY & NEUROLOGY

## 2017-10-20 RX ORDER — QUETIAPINE FUMARATE 25 MG/1
25 TABLET, FILM COATED ORAL AT BEDTIME
Status: DISCONTINUED | OUTPATIENT
Start: 2017-10-20 | End: 2017-10-22 | Stop reason: HOSPADM

## 2017-10-20 RX ORDER — EPINEPHRINE 0.3 MG/.3ML
0.3 INJECTION SUBCUTANEOUS
Qty: 0.6 ML | Refills: 1 | Status: SHIPPED | OUTPATIENT
Start: 2017-10-20 | End: 2022-01-17

## 2017-10-20 RX ORDER — QUETIAPINE FUMARATE 25 MG/1
25 TABLET, FILM COATED ORAL
Status: DISCONTINUED | OUTPATIENT
Start: 2017-10-20 | End: 2017-10-22 | Stop reason: HOSPADM

## 2017-10-20 RX ORDER — CLONIDINE HYDROCHLORIDE 0.1 MG/1
0.1 TABLET ORAL 2 TIMES DAILY
Status: DISCONTINUED | OUTPATIENT
Start: 2017-10-20 | End: 2017-10-22 | Stop reason: HOSPADM

## 2017-10-20 RX ADMIN — AMLODIPINE BESYLATE 10 MG: 10 TABLET ORAL at 09:50

## 2017-10-20 RX ADMIN — HYDROXYZINE HYDROCHLORIDE 50 MG: 25 TABLET ORAL at 02:34

## 2017-10-20 RX ADMIN — MULTIPLE VITAMINS W/ MINERALS TAB 1 TABLET: TAB at 09:50

## 2017-10-20 RX ADMIN — Medication 100 MG: at 09:51

## 2017-10-20 RX ADMIN — BUPRENORPHINE HCL 2 MG: 2 TABLET SUBLINGUAL at 13:20

## 2017-10-20 RX ADMIN — CLONIDINE HYDROCHLORIDE 0.1 MG: 0.1 TABLET ORAL at 09:51

## 2017-10-20 RX ADMIN — FOLIC ACID 1 MG: 1 TABLET ORAL at 09:51

## 2017-10-20 RX ADMIN — QUETIAPINE FUMARATE 50 MG: 50 TABLET, EXTENDED RELEASE ORAL at 01:43

## 2017-10-20 RX ADMIN — BUPRENORPHINE HCL 2 MG: 2 TABLET SUBLINGUAL at 09:52

## 2017-10-20 RX ADMIN — QUETIAPINE FUMARATE 25 MG: 25 TABLET ORAL at 22:24

## 2017-10-20 RX ADMIN — BUPRENORPHINE HCL 2 MG: 2 TABLET SUBLINGUAL at 20:25

## 2017-10-20 RX ADMIN — BUPRENORPHINE HCL 2 MG: 2 TABLET SUBLINGUAL at 17:06

## 2017-10-20 RX ADMIN — ONDANSETRON 4 MG: 4 TABLET, ORALLY DISINTEGRATING ORAL at 20:25

## 2017-10-20 RX ADMIN — CLONIDINE HYDROCHLORIDE 0.1 MG: 0.1 TABLET ORAL at 18:26

## 2017-10-20 ASSESSMENT — ACTIVITIES OF DAILY LIVING (ADL)
GROOMING: INDEPENDENT
ORAL_HYGIENE: INDEPENDENT
DRESS: STREET CLOTHES
LAUNDRY: WITH SUPERVISION

## 2017-10-20 NOTE — PROGRESS NOTES
Brief Medicine Note    Medicine is following this patient's BP. BP has significantly improved with addition of clonidine 0.1mg BID. Please continue current regimen on discharge and have patient follow up with PCP for further BP management.    Currently, this patient is medically stable and internal medicine will sign off. Please page the Internal Medicine job code pager for any intercurrent medical issues which arise. Thank you for the opportunity to be a part of this patient's care.    Rolf Ahumada PA-C  Hospital Medicine  Pager: [phone]

## 2017-10-20 NOTE — PROGRESS NOTES
Case Management Note  10/20/2017    Writer met with pt to update her Rule 25 Assessment received from [facility]. Assessment documents faxed to [facility] Rule 25 requesting funding [facility]. Case management to continue.    Addendum    Writer informed by [facility] that since pt has an open CPS case in [facility], her Rule 25 funding will need to be requested from there. Pt signed OLYA for [facility]. Writer discovered that [facility] has immediate openings in their IOP w/lodging Program for women. Pt's assessment documents faxed to [facility] for intake/admission. Writer faxed assessment documents to [facility] Rule 25 requesting funding for IOP w/lodging in [facility] women[facility]. Case management to continue.    Kian Glasgow MA, LADC

## 2017-10-20 NOTE — PROGRESS NOTES
Behavior .    Around dinner time, pt. appeared upset w/me because she got the wrong meal. I tried talking to pt, to calm her down and explained to her that I am going to call dietary so we could figure the mixed up. Pt continued to complain about her meal and she started calling me names like  mohamud mullen and ignorant black people  I got the charge RN involved, I explained to her what was going on and she talked to the pt. At 8:15pm, I went to get pt and roommate up for vitals she started swearing at me again. I went and reported to charge RN and let her know what was going on.

## 2017-10-20 NOTE — PROGRESS NOTES
"Pt continues on withdrawal monitoring via COWS scale, has been taken off of alcohol withdrawal monitoring as she has not had any diazepam since 1600 on 10/18/17. Patient's COWS scores today were 6 and 4. Pt is making frequent requests to leave the hospital tonight. Patient was informed that she would need to sign a 72 hour intent to leave form. Patient declines to sign the form, stating \"if i'm gonna see a doctor tomorrow why sign a 72 hour intent form?!\". Patient asks writer to call our manager, and writer reminded patient that the unit manager would be available in the morning. Patient stated to writer \"now this is really what's going on..my  was just released from [facility], he makes bad decisions, doesn't take care of himself... He's diabetic, doesn't do his insulin, I need to go take care of him, he could die\". Writer validated patient's concerns and reminded patient that her responsibility is to herself and her safety. Patient continues to seek reasons to be upset about things, even after staff have done everything they can to resolve any issues. Patient calms with validation and reassurance. Patient placed on elopement precautions.  Pt has exhibited bizarre behavior this evening, one moment is alert and a moment later is falling asleep at the desk. Patient's mood is labile. Patient c/o headache around dinner time, given tylenol which provided relief. Writer held pt's evening dose of seroquel due to obvious sedation, patient agrees to take it if she has difficulty sleeping. Will continue to monitor.    "

## 2017-10-20 NOTE — PROGRESS NOTES
"Addiction Medicine Progress Note          Assessment and Plan:   Assessment:   Principal Problem:    Opioid dependence with withdrawal (H), in buprenorphine induction  Active Problems:    Opioid use disorder, severe, dependence (H), has not made plan regarding maintenance yet    Essential hypertension, managed by IM consultant    Alcohol use disorder, severe, dependence (H)    Alcohol dependence with withdrawal with complication (H), out of active detoxification status    Severe cocaine use disorder      Plan:   Continue current care; have not made changes to buprenorphine as patient would not engage in conversation about desires regarding maintenance versus taper  Dispo planning ongoing; see CM notes for details, but patient is participating in treatment planning, and [facility] or equivalent is being recommended for funding  While not recommended, patient may discharge if requested during weekend, without medication except for naloxone and epi-pen  Dr. Castro will assume care again as of 5:00 pm tonight          Interval History:   No acute events except for some irritable interactions with staff overnight. Gave patient several opportunities to meet with me, her response being, \"why do I have to meet with you\", \"yes, I have questions\", \"why are you following me around\". Active in milieu with peer, which she made clear was priority. She ultimately declined opportunity to meet, so today's plan based on observation and discussion with staff.            Review of Systems:   Complete ROS performed and is negative except as indicated in interval history, and elsewhere in this note           Medications:     Current Facility-Administered Medications   Medication     cloNIDine (CATAPRES) tablet 0.1 mg     buprenorphine (SUBUTEX) sublingual tablet 2 mg     ondansetron (ZOFRAN-ODT) ODT tab 4 mg     EPINEPHrine (EPIPEN/ADRENACLICK/or ANY BX GENERIC EQUIV) injection 0.3 mg     hydrOXYzine (ATARAX) tablet 25-50 mg     " acetaminophen (TYLENOL) tablet 1,000 mg     diazepam (VALIUM) tablet 5-20 mg     thiamine tablet 100 mg     folic acid (FOLVITE) tablet 1 mg     multivitamin, therapeutic with minerals (THERA-VIT-M) tablet 1 tablet     amLODIPine (NORVASC) tablet 10 mg     QUEtiapine (SEROquel XR) 24 hr tablet 50 mg     naloxone (NARCAN) injection 0.1-0.4 mg     polyethylene glycol (MIRALAX/GLYCOLAX) Packet 17 g          Physical Exam:     Vitals were reviewed  Patient Vitals for the past 12 hrs:   BP Temp Temp src Pulse Resp   10/19/17 2041 - - - 68 -   10/19/17 2024 (!) 151/95 96.4  F (35.8  C) Tympanic 57 16     Constitutional:   Alert, non-toxic, no physical distress     Eyes:   anicteric     ENT:   No apparent rhinorrhea     Lungs:   Declined exam     Cardiovascular:   Declined exam     Neurologic:   Gait and coordination grossly intact     Skin:   Limited exam, but no overt infectious signs, no flushing, no jaundice     MENTAL STATUS EXAM  Appearance: casual attire, minimally groomed  Attitude: guarded and avoidant  Behavior: no agitation or slowing  Eye Contact: avoided  Speech: coherent, no pressuring  Orientation: oriented to person , place, time and situation  Mood:  No answer  Affect: irritable  Thought Process: cannot full assess, appears goal-directed  Suicidal Ideation: denies thought/intent/plan  Hallucination: denies  Insight: appears limited  Judgment: appears adequate for safety         Data:   All laboratory data reviewed, no new interval data. Blood-borne pathogen testing pending/in process.    Attestation:  I have reviewed today's vital signs, notes, medications, and labs/studies pertinent to this encounter.    Neel Rodriguez MD  Pediatrics/Addiction Medicine

## 2017-10-20 NOTE — PLAN OF CARE
Problem: Substance Withdrawal  Goal: Substance Withdrawal  Signs and symptoms of listed problems will be absent or manageable. 1) Patient will achieve medical stabilization of acute withdrawal sx.  2) Patient will remain safe and free from injury  3) Patient will demonstrate improvement of ADLs (appetite, hygiene)  4) Verbalize reduction of fear or anxiety to a manageable level.  5) Verbalize knowledge of substance abuse as a disease  6) Verbalize risks and negative effects related to drug ingestion  7) Demonstrate participation in unit programming and attends specific substance use group therapy (i.e AA meetings)  8) Accept referral to substance abuse treatment  9) Express sense of regaining some control of situation/life (possible by verbalizing alternative coping mechanisms as alternatives to substance use in response to stress)   Outcome: No Change  Pt continues on scheduled buprenorphine 2 mg QID. Mild opiate withdrawal symptoms. Pt reporting stomach cramps but states she has been eating a lot. BP low at noon. Pt seems oversedated. States she feels sleepy. See ordered to decrease HS seroquel. No requested to leave today. Pt out on unit. Mood irritable at times. Discharge plans pending.   M
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
## 2017-10-20 NOTE — PROGRESS NOTES
"SPIRITUAL HEALTH SERVICES    [facility] ([facility]) Unit 3AW      REFERRAL SOURCE: request at admission    Offered visit to pt Elaina who stated that she just wanted to be kept in prayer. Her prayer requests are for her marriage, which is going through a tough time, and for her children. She noted that while many people have told her that one has to be motivated for sobriety for one's own sake, her primary motivation is her kids. \"My self doesn't care if I wake up a mess or go to bed a mess -- but it's how it impacts my kids.\" Pt requested a Recovery Bible, which I agreed to retrieve for her.    PLAN: [facility] remains available to Elaina for the duration of her time on 3AW.                                                                                                                              Michelle Samuels  Staff   Pager [phone]    "

## 2017-10-20 NOTE — PROGRESS NOTES
"S:  Pt was extremely angry and unhappy that she received an incorrect food order.  In fact she stated that she had a tomato allergy and there were tomatoes on her salad.  Writer apologized and added the food allergy note in her diet order, (it was already listed under her allergies at the header of her chart.  Writer called the dietary office once, left a message then called again when pt came back to the desk, more angry than before.  She started speaking the F-word, saying that she wanted to discharge.  Writer stated that it wouldn't be advisable to leave just because the kitchen couldn't get her orrder rigght. You're not doiing anything to get my food. Writer stated that on call provided would be called with the request to discharge relayed to him.  \"You don't care that I'm hungry\"  Writer said \"I'm glad you are hungry\" and was about to say \"It shows that you are getting better\"  But she blew up and did not want writer to talk to her anymore.    On-call was notified  "

## 2017-10-21 VITALS
DIASTOLIC BLOOD PRESSURE: 94 MMHG | TEMPERATURE: 97.1 F | HEART RATE: 86 BPM | HEIGHT: 62 IN | SYSTOLIC BLOOD PRESSURE: 131 MMHG | WEIGHT: 132 LBS | OXYGEN SATURATION: 96 % | BODY MASS INDEX: 24.29 KG/M2 | RESPIRATION RATE: 16 BRPM

## 2017-10-21 PROCEDURE — 25000132 ZZH RX MED GY IP 250 OP 250 PS 637: Performed by: PSYCHIATRY & NEUROLOGY

## 2017-10-21 PROCEDURE — 25000132 ZZH RX MED GY IP 250 OP 250 PS 637: Performed by: EMERGENCY MEDICINE

## 2017-10-21 PROCEDURE — 25000132 ZZH RX MED GY IP 250 OP 250 PS 637: Performed by: PHYSICIAN ASSISTANT

## 2017-10-21 RX ORDER — POLYETHYLENE GLYCOL 3350 17 G/17G
17 POWDER, FOR SOLUTION ORAL DAILY
Status: DISCONTINUED | OUTPATIENT
Start: 2017-10-21 | End: 2017-10-22 | Stop reason: HOSPADM

## 2017-10-21 RX ADMIN — BUPRENORPHINE HCL 2 MG: 2 TABLET SUBLINGUAL at 20:32

## 2017-10-21 RX ADMIN — CLONIDINE HYDROCHLORIDE 0.1 MG: 0.1 TABLET ORAL at 18:32

## 2017-10-21 RX ADMIN — QUETIAPINE FUMARATE 25 MG: 25 TABLET, FILM COATED ORAL at 20:36

## 2017-10-21 RX ADMIN — AMLODIPINE BESYLATE 10 MG: 10 TABLET ORAL at 09:30

## 2017-10-21 RX ADMIN — QUETIAPINE FUMARATE 25 MG: 25 TABLET ORAL at 00:23

## 2017-10-21 RX ADMIN — BUPRENORPHINE HCL 2 MG: 2 TABLET SUBLINGUAL at 16:23

## 2017-10-21 RX ADMIN — FOLIC ACID 1 MG: 1 TABLET ORAL at 09:31

## 2017-10-21 RX ADMIN — MULTIPLE VITAMINS W/ MINERALS TAB 1 TABLET: TAB at 09:29

## 2017-10-21 RX ADMIN — BUPRENORPHINE HCL 2 MG: 2 TABLET SUBLINGUAL at 12:39

## 2017-10-21 RX ADMIN — BUPRENORPHINE HCL 2 MG: 2 TABLET SUBLINGUAL at 09:31

## 2017-10-21 RX ADMIN — POLYETHYLENE GLYCOL 3350 17 G: 17 POWDER, FOR SOLUTION ORAL at 10:16

## 2017-10-21 RX ADMIN — ACETAMINOPHEN 1000 MG: 500 TABLET ORAL at 14:51

## 2017-10-21 ASSESSMENT — ACTIVITIES OF DAILY LIVING (ADL)
DRESS: STREET CLOTHES
GROOMING: INDEPENDENT
LAUNDRY: WITH SUPERVISION
ORAL_HYGIENE: INDEPENDENT

## 2017-10-21 NOTE — PROGRESS NOTES
"Pt out on unit. Less sedation noted today. Pt continues to state she will lose focus or fall asleep at times. Pt participated in programming on unit. Reporting mild opiate withdrawal symptoms and remains on buprenorphine 2 mg QID. Pt reporting epigastric pain. Appears to be eating a lot of food. Pt concerned it is a return of her pancreatitis. Discussed with Medical and they do not feel it is pancreatitis. Pt given extra strength tylenol for pain. Pt requesting to see a psychiatrist \"Because of all this stuff in my head.\" Pt denies feeling depressed or SI. Pt will see Dr. Castro on Monday am. Discharge plans pending. Mood irritable at times.   "

## 2017-10-21 NOTE — PROGRESS NOTES
"Male admitted to unit who patient Elaina is now saying is her . This male was found in patients room in the bathroom. Call out to nursing supervisor. Pt informed that she cannot have contact with her  on unit. Pt stated \"Well I want to leave because there is no way I can be here and not talk to my .\" Pt informed she needs to not be in same room or have contact with  at this time. Please see additional note.   "

## 2017-10-21 NOTE — PROGRESS NOTES
Pt's alleged  was found in patient's bathroom, alone, upon 1830 room checks. Elaina had just left the room prior to my entering for checks. RN was notified immediately.

## 2017-10-21 NOTE — PROGRESS NOTES
Per ERIC Coleman, funding has been requested from [facility]. Waiting for approval to [place] ([facility]). Update was completed by ERIC and faxed in.

## 2017-10-22 NOTE — PROGRESS NOTES
Pt now requesting to leave. Discussed with dr. Joseph. Okay to discharge patient AMA. Pt signed AMA papers.

## 2017-10-22 NOTE — PROGRESS NOTES
Pt again reminded of space restrictions but patient again reported she would not avoid siting or talking to her . Pt want to know if she leaves if it will be with medications. Pt went into husbands room. States she was going to tell him she could abide by the restriction.

## 2017-10-22 NOTE — PROGRESS NOTES
Pt mood calmer. Pt apologized for her behavior. States she  is upset with her  and that she should not of had him come here. Pt provided handbook of the streets and recovery meeting resources. Pt being discharge with no medications. Pt talked of needing to pass a drug screen on Monday. VSS. Denies SI. Pt made comment that she may leave and use. Pt encouraged to follow up with her recovery plan. States her step father is going to pick her up and she may go stay with her uncle. During the discharge process patient was in VS room and her  attempted to get in room.  begun yelling at Elaina rodríguez. Elaina escorted out back entrance stating she was not afraid of him and that he should be afraid of her.

## 2017-10-24 NOTE — DISCHARGE SUMMARY
Psychiatric Discharge Summary    Elaina Menezes MRN# [id number]   Age: 36 year old YOB: 1980     Date of Admission:  10/17/2017  Date of Discharge:  10/21/2017 10:08 PM  Admitting Physician:  Yamilka Rodriguez MD  Discharge Physician:  David Maddox MD (Contact: Pager: [phone] )         Event Leading to Hospitalization:   Heroin.       HISTORY OF PRESENT ILLNESS:  The patient is an extremely poor historian, falls asleep, made 2 attempts to talk with her but she is not able to provide.  She gets irritable when asked for details.  The patient came to the emergency room wanting help.  She has been using alcohol and heroin.  She has been saying this is a 10-year history.  She has tolerance to both of them, withdrawal from them, but no history of DTs or seizures.  Progressive use with loss of control, use despite negative consequences, health, money.       The patient has bipolar.  When she gets manic she cannot sleep.  She does not have any suicidal ideation.  She does not have any auditory or visual hallucinations, does not have any psychosis.       PAST PSYCHIATRIC HISTORY:  Psychiatrically hospitalized once in 2009 after a suicide attempt.  This was hospitalization in [facility].  Patient was in 2 chemical dependency treatments.  In 2014 was ARF.  The patient has previously been on Seroquel and Lexapro.       Vitals are as below.  Temperature of 98.4, pulse of 83, respiratory rate of 16, blood pressure 164/713.        See Admission note by John Castro MD for additional details.          DIagnoses:     Alcohol use disorder, severe.   Opiate use disorder, severe.          Labs:     Results for ELAINA MENEZES (MRN [id number]) as of 10/24/2017 18:06   Ref. Range 10/17/2017 22:01 10/17/2017 23:26 10/18/2017 03:29 10/20/2017 07:03   Sodium Latest Ref Range: 133 - 144 mmol/L  146 (H)     Potassium Latest Ref Range: 3.4 - 5.3 mmol/L  3.5     Chloride Latest Ref Range: 94 - 109  mmol/L  109     Carbon Dioxide Latest Ref Range: 20 - 32 mmol/L  29     Urea Nitrogen Latest Ref Range: 7 - 30 mg/dL  6 (L)     Creatinine Latest Ref Range: 0.52 - 1.04 mg/dL  0.72     GFR Estimate Latest Ref Range: >60 mL/min/1.7m2  >90     GFR Estimate If Black Latest Ref Range: >60 mL/min/1.7m2  >90     Calcium Latest Ref Range: 8.5 - 10.1 mg/dL  8.6     Anion Gap Latest Ref Range: 3 - 14 mmol/L  8     Albumin Latest Ref Range: 3.4 - 5.0 g/dL  3.2 (L)     Protein Total Latest Ref Range: 6.8 - 8.8 g/dL  6.8     Bilirubin Total Latest Ref Range: 0.2 - 1.3 mg/dL  0.1 (L)     Alkaline Phosphatase Latest Ref Range: 40 - 150 U/L  58     ALT Latest Ref Range: 0 - 50 U/L  28     AST Latest Ref Range: 0 - 45 U/L  32     Lipase Latest Ref Range: 73 - 393 U/L  446 (H)     Glucose Latest Ref Range: 70 - 99 mg/dL  71     WBC Latest Ref Range: 4.0 - 11.0 10e9/L  10.4     Hemoglobin Latest Ref Range: 11.7 - 15.7 g/dL  10.5 (L)     Hematocrit Latest Ref Range: 35.0 - 47.0 %  32.7 (L)     Platelet Count Latest Ref Range: 150 - 450 10e9/L  365     RBC Count Latest Ref Range: 3.8 - 5.2 10e12/L  3.99     MCV Latest Ref Range: 78 - 100 fl  82     MCH Latest Ref Range: 26.5 - 33.0 pg  26.3 (L)     MCHC Latest Ref Range: 31.5 - 36.5 g/dL  32.1     RDW Latest Ref Range: 10.0 - 15.0 %  17.3 (H)     Diff Method Unknown  Automated Method     % Neutrophils Latest Units: %  57.7     % Lymphocytes Latest Units: %  36.0     % Monocytes Latest Units: %  5.7     % Eosinophils Latest Units: %  0.2     % Basophils Latest Units: %  0.2     % Immature Granulocytes Latest Units: %  0.2     Nucleated RBCs Latest Ref Range: 0 /100  0     Absolute Neutrophil Latest Ref Range: 1.6 - 8.3 10e9/L  6.0     Absolute Lymphocytes Latest Ref Range: 0.8 - 5.3 10e9/L  3.7     Absolute Monocytes Latest Ref Range: 0.0 - 1.3 10e9/L  0.6     Absolute Eosinophils Latest Ref Range: 0.0 - 0.7 10e9/L  0.0     Absolute Basophils Latest Ref Range: 0.0 - 0.2 10e9/L  0.0    "  Abs Immature Granulocytes Latest Ref Range: 0 - 0.4 10e9/L  0.0     Absolute Nucleated RBC Unknown  0.0     Hepatitis C Antibody Latest Ref Range: NR^Nonreactive     Nonreactive   HIV Antigen Antibody Combo Latest Ref Range: NR^Nonreactive        Nonreactive   Alcohol Breath Test Latest Ref Range: 0.00 - 0.01  0.121 (A)      Amphetamine Qual Urine Latest Ref Range: NEG^Negative    Negative    Cocaine Qual Urine Latest Ref Range: NEG^Negative    Positive (A)    Opiates Qualitative Urine Latest Ref Range: NEG^Negative    Positive (A)    Cannabinoids Qual Urine Latest Ref Range: NEG^Negative    Negative    Barbiturates Qual Urine Latest Ref Range: NEG^Negative    Negative    Benzodiazepine Qual Urine Latest Ref Range: NEG^Negative    Negative    Ethanol Qual Urine Latest Ref Range: NEG^Negative    Positive (A)             Consults:   Patient refused to meet with PA internist.         Hospital Course:   Elaina Lin was admitted to [facility] with attending Dr. Lozoya. She was treated with Buprenorphine for opiate withdrawal and per computer records showed some improvement in her symptoms. She was admitted by John Castro MD and followed by Dr. Lozoya. She was discharged over weekend. I didn't talk to Elaina, only briefly saw her sitting and talking to patient Natalio Arriaga who according to the patient and Mr Lance was Elaina's .  For details about patient's discharge, please, see RN's note below.     \"Male admitted to unit who patient Elaina is now saying is her . This male was found in patients room in the bathroom. Call out to nursing supervisor. Pt informed that she cannot have contact with her  on unit. Pt stated \"Well I want to leave because there is no way I can be here and not talk to my .\" Pt informed she needs to not be in same room or have contact with  at this time. Please see additional note.     \"Pt mood calmer. Pt apologized for her behavior. States she  " "is upset with her  and that she should not of had him come here. Pt provided handbook of the streets and recovery meeting resources. Pt being discharge with no medications. Pt talked of needing to pass a drug screen on Monday. VSS. Denies SI. Pt made comment that she may leave and use. Pt encouraged to follow up with her recovery plan. States her step father is going to pick her up and she may go stay with her uncle. During the discharge process patient was in VS room and her  attempted to get in room.  begun yelling at Elaina rodríguez. Elaina escorted out back entrance stating she was not afraid of him and that he should be afraid of her.\"           Elaina Lin did participate in groups and was visible in the milieu.     The patient's symptoms of withdrawal improved.     Elaina Lin was released to home. At the time of discharge Elaina Lin was determined to not be a danger to herself or others.          Discharge Medications:     Discharge Medication List as of 10/21/2017 10:08 PM      START taking these medications    Details   EPINEPHrine (EPIPEN/ADRENACLICK/OR ANY BX GENERIC EQUIV) 0.3 MG/0.3ML injection 2-pack Inject 0.3 mLs (0.3 mg) into the muscle once as needed for anaphylaxis, Disp-0.6 mL, R-1, E-Prescribe      naloxone (NARCAN) 1 mg/mL for intranasal kit (2 syringes with 2 mucosal atomizer device) In opioid overdose put cone in nostril and push 1/2 of contents into each nostril.  Repeat every 3 min if no response until help arrives., Disp-1 kit, R-0, E-PrescribeFor intranasal administration: Dispense 2 naloxone injection 2 mg/2 mL syringes.  Incl ude 2 mucosal atomization devices (MAD-Nasal).         CONTINUE these medications which have NOT CHANGED    Details   BusPIRone HCl (BUSPAR PO) Patient unsure of dosage, Historical      NIFEdipine osmotic (PROCARDIA XL) 30 MG 24 hr tablet Take 1 tablet (30 mg) by mouth daily, Disp-30 tablet, R-1, E-Prescribe      Divalproex " Sodium (DEPAKOTE PO) 250 mg in the AM and 500 mg at night, Oral, Until Discontinued, Historical      QUEtiapine (SEROQUEL) 100 MG tablet Take by mouth At Bedtime, Oral, AT BEDTIME, Until Discontinued, Historical         STOP taking these medications       metoprolol (LOPRESSOR) 100 MG tablet Comments:   Reason for Stopping:         metroNIDAZOLE (FLAGYL) 500 MG tablet Comments:   Reason for Stopping:         medroxyPROGESTERone (DEPO-PROVERA) 150 MG/ML injection Comments:   Reason for Stopping:                    Psychiatric Examination:   Was not done, patient was not seen before discharge.         Discharge Plan:      AVS was not completed, patient left over weekend. See above.    Attestation:  The patient has not been seen and evaluated by me,  David Maddox MD.

## 2018-05-31 ENCOUNTER — APPOINTMENT (OUTPATIENT)
Dept: CT IMAGING | Facility: CLINIC | Age: 38
End: 2018-05-31
Attending: EMERGENCY MEDICINE
Payer: MEDICAID

## 2018-05-31 ENCOUNTER — HOSPITAL ENCOUNTER (INPATIENT)
Facility: CLINIC | Age: 38
LOS: 4 days | Discharge: HOME | End: 2018-06-05
Attending: EMERGENCY MEDICINE | Admitting: SURGERY
Payer: MEDICAID

## 2018-05-31 DIAGNOSIS — I10 HTN, GOAL BELOW 140/90: ICD-10-CM

## 2018-05-31 DIAGNOSIS — F31.9 BIPOLAR AFFECTIVE DISORDER, REMISSION STATUS UNSPECIFIED (H): ICD-10-CM

## 2018-05-31 DIAGNOSIS — R05.9 COUGH: Primary | ICD-10-CM

## 2018-05-31 DIAGNOSIS — F19.10 SUBSTANCE ABUSE (H): ICD-10-CM

## 2018-05-31 DIAGNOSIS — G25.9 MOVEMENT DISORDER: ICD-10-CM

## 2018-05-31 DIAGNOSIS — F33.2 MAJOR DEPRESSIVE DISORDER, RECURRENT EPISODE, SEVERE, WITHOUT MENTION OF PSYCHOTIC BEHAVIOR: ICD-10-CM

## 2018-05-31 DIAGNOSIS — F11.229 OPIOID DEPENDENCE WITH INTOXICATION WITH COMPLICATION (H): ICD-10-CM

## 2018-05-31 DIAGNOSIS — D50.8 IRON DEFICIENCY ANEMIA SECONDARY TO INADEQUATE DIETARY IRON INTAKE: ICD-10-CM

## 2018-05-31 LAB
ALBUMIN SERPL-MCNC: 3 G/DL (ref 3.4–5)
ALBUMIN UR-MCNC: 10 MG/DL
ALP SERPL-CCNC: 53 U/L (ref 40–150)
ALT SERPL W P-5'-P-CCNC: 34 U/L (ref 0–50)
AMMONIA PLAS-SCNC: 17 UMOL/L (ref 10–50)
AMPHETAMINES UR QL SCN: NEGATIVE
ANION GAP SERPL CALCULATED.3IONS-SCNC: 10 MMOL/L (ref 3–14)
APPEARANCE UR: CLEAR
AST SERPL W P-5'-P-CCNC: 50 U/L (ref 0–45)
BARBITURATES UR QL: NEGATIVE
BASOPHILS # BLD AUTO: 0 10E9/L (ref 0–0.2)
BASOPHILS NFR BLD AUTO: 0.2 %
BENZODIAZ UR QL: NEGATIVE
BILIRUB SERPL-MCNC: 0.3 MG/DL (ref 0.2–1.3)
BILIRUB UR QL STRIP: NEGATIVE
BUN SERPL-MCNC: 6 MG/DL (ref 7–30)
CALCIUM SERPL-MCNC: 8.2 MG/DL (ref 8.5–10.1)
CANNABINOIDS UR QL SCN: NEGATIVE
CHLORIDE SERPL-SCNC: 108 MMOL/L (ref 94–109)
CK SERPL-CCNC: 639 U/L (ref 30–225)
CO2 SERPL-SCNC: 27 MMOL/L (ref 20–32)
COCAINE UR QL: POSITIVE
COLOR UR AUTO: YELLOW
CREAT SERPL-MCNC: 0.98 MG/DL (ref 0.52–1.04)
DIFFERENTIAL METHOD BLD: ABNORMAL
EOSINOPHIL # BLD AUTO: 0.1 10E9/L (ref 0–0.7)
EOSINOPHIL NFR BLD AUTO: 0.4 %
ERYTHROCYTE [DISTWIDTH] IN BLOOD BY AUTOMATED COUNT: 16.5 % (ref 10–15)
ETHANOL SERPL-MCNC: <0.01 G/DL
ETHANOL UR QL SCN: NEGATIVE
GFR SERPL CREATININE-BSD FRML MDRD: 64 ML/MIN/1.7M2
GLUCOSE BLDC GLUCOMTR-MCNC: 103 MG/DL (ref 70–99)
GLUCOSE BLDC GLUCOMTR-MCNC: 54 MG/DL (ref 70–99)
GLUCOSE BLDC GLUCOMTR-MCNC: 55 MG/DL (ref 70–99)
GLUCOSE BLDC GLUCOMTR-MCNC: 59 MG/DL (ref 70–99)
GLUCOSE BLDC GLUCOMTR-MCNC: 65 MG/DL (ref 70–99)
GLUCOSE BLDC GLUCOMTR-MCNC: 73 MG/DL (ref 70–99)
GLUCOSE BLDC GLUCOMTR-MCNC: 78 MG/DL (ref 70–99)
GLUCOSE SERPL-MCNC: 66 MG/DL (ref 70–99)
GLUCOSE UR STRIP-MCNC: NEGATIVE MG/DL
HCG SERPL QL: NEGATIVE
HCT VFR BLD AUTO: 36.4 % (ref 35–47)
HGB BLD-MCNC: 11.4 G/DL (ref 11.7–15.7)
HGB UR QL STRIP: NEGATIVE
HYALINE CASTS #/AREA URNS LPF: 40 /LPF (ref 0–2)
IMM GRANULOCYTES # BLD: 0.1 10E9/L (ref 0–0.4)
IMM GRANULOCYTES NFR BLD: 0.3 %
INR PPP: 1.16 (ref 0.86–1.14)
KETONES UR STRIP-MCNC: NEGATIVE MG/DL
LEUKOCYTE ESTERASE UR QL STRIP: NEGATIVE
LYMPHOCYTES # BLD AUTO: 3.1 10E9/L (ref 0.8–5.3)
LYMPHOCYTES NFR BLD AUTO: 19.5 %
MCH RBC QN AUTO: 27.7 PG (ref 26.5–33)
MCHC RBC AUTO-ENTMCNC: 31.3 G/DL (ref 31.5–36.5)
MCV RBC AUTO: 89 FL (ref 78–100)
MONOCYTES # BLD AUTO: 1 10E9/L (ref 0–1.3)
MONOCYTES NFR BLD AUTO: 6.3 %
MUCOUS THREADS #/AREA URNS LPF: PRESENT /LPF
NEUTROPHILS # BLD AUTO: 11.5 10E9/L (ref 1.6–8.3)
NEUTROPHILS NFR BLD AUTO: 73.3 %
NITRATE UR QL: NEGATIVE
NRBC # BLD AUTO: 0 10*3/UL
NRBC BLD AUTO-RTO: 0 /100
OPIATES UR QL SCN: NEGATIVE
PH UR STRIP: 5.5 PH (ref 5–7)
PLATELET # BLD AUTO: 341 10E9/L (ref 150–450)
POTASSIUM SERPL-SCNC: 3.7 MMOL/L (ref 3.4–5.3)
PROT SERPL-MCNC: 6.8 G/DL (ref 6.8–8.8)
RBC # BLD AUTO: 4.11 10E12/L (ref 3.8–5.2)
RBC #/AREA URNS AUTO: <1 /HPF (ref 0–2)
SODIUM SERPL-SCNC: 145 MMOL/L (ref 133–144)
SOURCE: ABNORMAL
SP GR UR STRIP: 1.01 (ref 1–1.03)
UROBILINOGEN UR STRIP-MCNC: NORMAL MG/DL (ref 0–2)
VALPROATE SERPL-MCNC: <3 MG/L (ref 50–100)
WBC # BLD AUTO: 15.7 10E9/L (ref 4–11)
WBC #/AREA URNS AUTO: 3 /HPF (ref 0–5)

## 2018-05-31 PROCEDURE — 96375 TX/PRO/DX INJ NEW DRUG ADDON: CPT

## 2018-05-31 PROCEDURE — 96366 THER/PROPH/DIAG IV INF ADDON: CPT

## 2018-05-31 PROCEDURE — 25000125 ZZHC RX 250: Performed by: EMERGENCY MEDICINE

## 2018-05-31 PROCEDURE — 80164 ASSAY DIPROPYLACETIC ACD TOT: CPT | Performed by: EMERGENCY MEDICINE

## 2018-05-31 PROCEDURE — 81001 URINALYSIS AUTO W/SCOPE: CPT | Performed by: EMERGENCY MEDICINE

## 2018-05-31 PROCEDURE — 00000146 ZZHCL STATISTIC GLUCOSE BY METER IP

## 2018-05-31 PROCEDURE — 80320 DRUG SCREEN QUANTALCOHOLS: CPT | Performed by: EMERGENCY MEDICINE

## 2018-05-31 PROCEDURE — 85610 PROTHROMBIN TIME: CPT | Performed by: EMERGENCY MEDICINE

## 2018-05-31 PROCEDURE — 25800025 ZZH RX 258: Performed by: EMERGENCY MEDICINE

## 2018-05-31 PROCEDURE — 96372 THER/PROPH/DIAG INJ SC/IM: CPT

## 2018-05-31 PROCEDURE — 82140 ASSAY OF AMMONIA: CPT | Performed by: EMERGENCY MEDICINE

## 2018-05-31 PROCEDURE — 84703 CHORIONIC GONADOTROPIN ASSAY: CPT | Performed by: EMERGENCY MEDICINE

## 2018-05-31 PROCEDURE — 25000132 ZZH RX MED GY IP 250 OP 250 PS 637: Performed by: EMERGENCY MEDICINE

## 2018-05-31 PROCEDURE — 96376 TX/PRO/DX INJ SAME DRUG ADON: CPT

## 2018-05-31 PROCEDURE — 96365 THER/PROPH/DIAG IV INF INIT: CPT

## 2018-05-31 PROCEDURE — 99285 EMERGENCY DEPT VISIT HI MDM: CPT | Mod: 25

## 2018-05-31 PROCEDURE — 80307 DRUG TEST PRSMV CHEM ANLYZR: CPT | Performed by: EMERGENCY MEDICINE

## 2018-05-31 PROCEDURE — 40000268 ZZH STATISTIC NO CHARGES

## 2018-05-31 PROCEDURE — 70450 CT HEAD/BRAIN W/O DYE: CPT

## 2018-05-31 PROCEDURE — 82550 ASSAY OF CK (CPK): CPT | Performed by: EMERGENCY MEDICINE

## 2018-05-31 PROCEDURE — 93005 ELECTROCARDIOGRAM TRACING: CPT

## 2018-05-31 PROCEDURE — 99292 CRITICAL CARE ADDL 30 MIN: CPT | Mod: 25 | Performed by: EMERGENCY MEDICINE

## 2018-05-31 PROCEDURE — 85025 COMPLETE CBC W/AUTO DIFF WBC: CPT | Performed by: EMERGENCY MEDICINE

## 2018-05-31 PROCEDURE — 99291 CRITICAL CARE FIRST HOUR: CPT | Mod: 25 | Performed by: EMERGENCY MEDICINE

## 2018-05-31 PROCEDURE — 93010 ELECTROCARDIOGRAM REPORT: CPT | Mod: Z6 | Performed by: EMERGENCY MEDICINE

## 2018-05-31 PROCEDURE — 25000128 H RX IP 250 OP 636: Performed by: EMERGENCY MEDICINE

## 2018-05-31 PROCEDURE — 80053 COMPREHEN METABOLIC PANEL: CPT | Performed by: EMERGENCY MEDICINE

## 2018-05-31 RX ORDER — HALOPERIDOL 5 MG/ML
2-4 INJECTION INTRAMUSCULAR
Status: COMPLETED | OUTPATIENT
Start: 2018-05-31 | End: 2018-05-31

## 2018-05-31 RX ORDER — FLUMAZENIL 0.1 MG/ML
0.2 INJECTION, SOLUTION INTRAVENOUS
Status: ACTIVE | OUTPATIENT
Start: 2018-05-31 | End: 2018-06-01

## 2018-05-31 RX ORDER — KETAMINE HYDROCHLORIDE 100 MG/ML
200 INJECTION INTRAMUSCULAR; INTRAVENOUS ONCE
Status: COMPLETED | OUTPATIENT
Start: 2018-05-31 | End: 2018-05-31

## 2018-05-31 RX ORDER — HALOPERIDOL 5 MG/ML
2 INJECTION INTRAMUSCULAR EVERY 10 MIN PRN
Status: DISCONTINUED | OUTPATIENT
Start: 2018-05-31 | End: 2018-06-01

## 2018-05-31 RX ORDER — OLANZAPINE 10 MG/1
10 TABLET, ORALLY DISINTEGRATING ORAL ONCE
Status: DISCONTINUED | OUTPATIENT
Start: 2018-05-31 | End: 2018-05-31

## 2018-05-31 RX ORDER — LORAZEPAM 1 MG/1
2 TABLET ORAL ONCE
Status: COMPLETED | OUTPATIENT
Start: 2018-05-31 | End: 2018-05-31

## 2018-05-31 RX ADMIN — KETAMINE HYDROCHLORIDE 200 MG: 100 INJECTION, SOLUTION, CONCENTRATE INTRAMUSCULAR; INTRAVENOUS at 12:58

## 2018-05-31 RX ADMIN — FOLIC ACID: 5 INJECTION, SOLUTION INTRAMUSCULAR; INTRAVENOUS; SUBCUTANEOUS at 15:22

## 2018-05-31 RX ADMIN — HALOPERIDOL LACTATE 2 MG: 5 INJECTION, SOLUTION INTRAMUSCULAR at 14:11

## 2018-05-31 RX ADMIN — HALOPERIDOL LACTATE 2 MG: 5 INJECTION, SOLUTION INTRAMUSCULAR at 14:44

## 2018-05-31 RX ADMIN — LORAZEPAM 2 MG: 1 TABLET ORAL at 10:56

## 2018-05-31 RX ADMIN — DEXTROSE AND SODIUM CHLORIDE: 5; 450 INJECTION, SOLUTION INTRAVENOUS at 22:46

## 2018-05-31 NOTE — ED NOTES
Per Dr. Pressley, patient to be transferred to [facility] ED for neurology consult.  Kristofer, patient's significant other and DEMETRIO Guallpa updated.

## 2018-05-31 NOTE — ED NOTES
Handoff report to DEMETRIO Guallpa.  Informed of course of ED stay and plan of care.  Saloni verbalized understanding.

## 2018-05-31 NOTE — ED NOTES
[facility], [place]   ED Nurse to Floor Handoff     Elaina Lin is a 37 year old female who speaks English and lives with others (roommates), in an apartment.  They arrived in the ED by car from home .    ED Chief Complaint: Medication Reaction (RXN to methadone does this morning)    ED Dx;   Final diagnoses:   None         Needed?: No    Allergies:   Allergies   Allergen Reactions     Tomato Anaphylaxis     Compazine [Prochlorperazine]    .  Past Medical Hx:   Past Medical History:   Diagnosis Date     Genital herpes 7/19/2012     IMO update changed this record. Please review for accuracy     H/O: substance abuse 8/31/2013     Postpartum depression 10/21/2011     Recur major depress, severe 8/1/2012     Substance abuse       Baseline Mental status: WDL  Current Mental Status changes: other snoring at times, when awake patient moves rapidly in bed, is able to be verbally redirected but does not speak    Infection present or suspected this encounter: no  Sepsis suspected: No  Isolation type: No active isolations     Activity level - Baseline/Home:  Independent  Activity Level - Current:   Stand with Assist of 2    Bariatric equipment needed?: No    In the ED these meds were given:   Medications   sodium chloride (PF) 0.9% PF flush 3 mL (not administered)   sodium chloride (PF) 0.9% PF flush 3 mL (3 mLs Intracatheter Not Given 5/31/18 1544)   flumazenil (ROMAZICON) injection 0.2 mg (not administered)   haloperidol lactate (HALDOL) injection 2 mg (2 mg Intravenous Given 5/31/18 1444)   LORazepam (ATIVAN) tablet 2 mg (2 mg Oral Given 5/31/18 1056)   ketamine (KETALAR) 100 mg/mL (high concentration) IM ADULT 200 mg (200 mg Intramuscular Given 5/31/18 1258)   haloperidol lactate (HALDOL) injection 2-4 mg (2 mg Intravenous Given 5/31/18 1411)   dextrose 5% and 0.9% NaCl 1,000 mL with INFUVITE 10 mL, thiamine 100 mg, folic acid 1 mg infusion ( Intravenous New Bag 5/31/18 5652)        Drips running?  No    Home pump  No    Current LDAs  Peripheral IV 10/17/17 Left (Active)   Number of days:226       Peripheral IV 05/31/18 Left Upper forearm (Active)   Site Assessment [facility] 5/31/2018  1:25 PM   Number of days:0       Labs results:   Labs Ordered and Resulted from Time of ED Arrival Up to the Time of Departure from the ED   CBC WITH PLATELETS DIFFERENTIAL - Abnormal; Notable for the following:        Result Value    WBC 15.7 (*)     Hemoglobin 11.4 (*)     MCHC 31.3 (*)     RDW 16.5 (*)     Absolute Neutrophil 11.5 (*)     All other components within normal limits   INR - Abnormal; Notable for the following:     INR 1.16 (*)     All other components within normal limits   COMPREHENSIVE METABOLIC PANEL - Abnormal; Notable for the following:     Sodium 145 (*)     Glucose 66 (*)     Urea Nitrogen 6 (*)     Calcium 8.2 (*)     Albumin 3.0 (*)     AST 50 (*)     All other components within normal limits   VALPROIC ACID - Abnormal; Notable for the following:     Valproic Acid Level <3 (*)     All other components within normal limits   CK TOTAL - Abnormal; Notable for the following:     CK Total 639 (*)     All other components within normal limits   DRUG ABUSE SCREEN 6 CHEM DEP URINE ([facility]) - Abnormal; Notable for the following:     Cocaine Qual Urine Positive (*)     All other components within normal limits   ROUTINE UA WITH MICROSCOPIC REFLEX TO CULTURE - Abnormal; Notable for the following:     Protein Albumin Urine 10 (*)     Mucous Urine Present (*)     Hyaline Casts 40 (*)     All other components within normal limits   GLUCOSE BY METER - Abnormal; Notable for the following:     Glucose 59 (*)     All other components within normal limits   GLUCOSE BY METER - Abnormal; Notable for the following:     Glucose 103 (*)     All other components within normal limits   GLUCOSE MONITOR NURSING POCT   AMMONIA   HCG QUALITATIVE   ALCOHOL ETHYL   PERIPHERAL IV CATHETER   PULSE OXIMETRY NURSING    STRAIGHT CATH FOR URINE   CARDIAC CONTINUOUS MONITORING       Imaging Studies:   Recent Results (from the past 24 hour(s))   Head CT w/o contrast    Narrative    CT OF THE HEAD WITHOUT CONTRAST 5/31/2018 3:14 PM     COMPARISON: None.    HISTORY: Altered mental status.     TECHNIQUE: Axial CT images of the head from the skull base to the  vertex were acquired without IV contrast.    FINDINGS: The ventricles and basal cisterns are within normal limits  in configuration. There is no midline shift. There are no extra-axial  fluid collections.  Gray-white differentiation is well maintained.    No intracranial hemorrhage, mass or recent infarct.    The visualized paranasal sinuses are well-aerated. There is no  mastoiditis. There are no fractures of the visualized bones.      Impression    IMPRESSION: Normal head CT.      Radiation dose for this scan was reduced using automated exposure  control, adjustment of the mA and/or kV according to patient size, or  iterative reconstruction technique    MICHAEL STEELE MD       Recent vital signs:   /86  Pulse 89  Temp 98.6  F (37  C) (Axillary)  Resp 14  Wt 71.2 kg (157 lb)  SpO2 93%  BMI 28.72 kg/m2    Cardiac Rhythm: Other-unable to obtain cardiac rhythm d/t patient condition  Pt needs tele? Yes  Skin/wound Issues: None    Code Status: Full Code    Pain control: fair    Nausea control: pt had none    Abnormal labs/tests/findings requiring intervention:     Family present during ED course? Yes   Family Comments/Social Situation comments: Significant other Kristofer present    Tasks needing completion: None    Lorraine Thompson RN  ascom-- *[phone]  [phone] [place] ED  [phone] [facility] ED

## 2018-05-31 NOTE — ED NOTES
Dr. Pressley notified of patient's blood glucose and temperature reading, Dr. Pressley verbalized understanding.

## 2018-05-31 NOTE — ED NOTES
Cardiac monitor not in place at this time, patient's rapid movements were causing monitor to come off per DEMETRIO Spears.

## 2018-05-31 NOTE — ED NOTES
2 nurses were able to take patient to ct and assist with ct scan. Patient continues to thrash around when stimulated. Is able to fall back asleep. Report given to 3 to 11 shift

## 2018-05-31 NOTE — ED NOTES
When patient is awakened, she starts to violently thrash about in the bed,striking out . Does not follow verbal commands. MD at bedside.

## 2018-05-31 NOTE — ED NOTES
This RN in room to take blood pressure/assess patient, patient sleeping.  Patient did not wake for POCT glucose stick but when this RN placed BP cuff on arm patient woke and began moving rapidly in bed, putting legs over head and shaking head no.  Patient able to lie down with verbal re-direction from this RN and then return to sleep.  Patient did not respond to this RN during this period but allowed this RN to place BP cuff on arm.  Dr. Pressley updated.

## 2018-05-31 NOTE — IP AVS SNAPSHOT
[address] [address]    Phone:  [phone]                                       After Visit Summary   5/31/2018    Elaina Lin    MRN: [id number]           After Visit Summary Signature Page     I have received my discharge instructions, and my questions have been answered. I have discussed any challenges I see with this plan with the nurse or doctor.    ..........................................................................................................................................  Patient/Patient Representative Signature      ..........................................................................................................................................  Patient Representative Print Name and Relationship to Patient    ..................................................               ................................................  Date                                            Time    ..........................................................................................................................................  Reviewed by Signature/Title    ...................................................              ..............................................  Date                                                            Time

## 2018-05-31 NOTE — ED PROVIDER NOTES
History     Chief Complaint   Patient presents with     Medication Reaction     RXN to methadone does this morning     HPI  Elaina Lin is a 37-year-old female transferred to the [facility] ED from the [place] ED for uncontrolled movements thought to be due to methadone vs. seizures vs. other medication reactions. The patient is sedated after management for agitation in the [facility] ED and so additional history obtained from her significant other. My understanding is that the patient has a history of polysubstance abuse including cocaine and heroin. Approximately a few months ago she went to retirement and so has been sober off heroin for several months. She then went to an outpatient chemical dependency treatment about 3 weeks ago and started on a methadone program. She is currently getting methadone care at [facility]. The patient significant other does not know her methadone dose but thinks it may be 30 mg a day. He relays that ever since patient began methadone she has been having periods of agitation. He states that she seems okay in the morning and gets up and goes and takes her dose at her center and then about 30 minutes after the medication dose she develops uncontrolled movements of her arms and legs as well as agitation and confusion. He reports that he thinks that the patient can hear him but she responds in one or two word statements that do not make sense. The symptoms last for several hours, he describes 12-20 hours but eventually by the morning she is back to her usual self. She did have a period of time over the Memorial Day weekend where she did not take her methadone doses for 1-2 days and it seemed like she was doing better. She has had some relapse on using crack. Her last use of crack was yesterday but is not using this daily. No reports from the significant other of significant trauma, nausea, vomiting, diarrhea, or fevers that he is aware of. Review of records from [facility]  ED shows that she required sedation with Haldol Ativan and ketamine.     This part of the medical record was transcribed by Shila Mendez, Medical Scribe, from a dictation done by An Kyae MD.     I have reviewed the Medications, Allergies, Past Medical and Surgical History, and Social History in the [facility] system.    Past Medical History:   Diagnosis Date     Genital herpes 2012     IMO update changed this record. Please review for accuracy     H/O: substance abuse 2013     Postpartum depression 10/21/2011     Recur major depress, severe 2012     Substance abuse        Past Surgical History:   Procedure Laterality Date      SECTION  2011 and 13     HERNIA REPAIR       REPAIR TENDON HAND         Family History   Problem Relation Age of Onset     Asthma Mother      DIABETES Mother      Allergies Mother      Psychotic Disorder Mother      schitzophrenic     DIABETES Maternal Grandmother      HEART DISEASE Maternal Grandmother      triple bypass     Eye Disorder Maternal Grandmother      cataracts     Hypertension Maternal Grandmother      Unknown/Adopted Maternal Grandfather      Unknown/Adopted Paternal Grandmother      Unknown/Adopted Paternal Grandfather      CANCER Sister      leukemia       Social History   Substance Use Topics     Smoking status: Current Every Day Smoker     Packs/day: 1.00     Types: Cigarettes     Smokeless tobacco: Never Used      Comment: about 1 cigarettes per day     Alcohol use Yes      Comment: 1 liter vodka per day     Review of Systems   Unable to perform ROS: Mental status change     Physical Exam   BP: 111/68  Pulse: 103  Heart Rate: 90  Temp: 98.6  F (37  C)  Resp: 14  Weight: 71.2 kg (157 lb)  SpO2: 93 %      Physical Exam   Gen: sedate, slightly twitchy to touch  HEENT: Pupils 1mm and nonreactive, no facial tenderness or wounds, head atraumatic, TMs clear bilaterally  CV:RRR without murmurs  PULM: Appropriate respiratory rate and normal work of  breathing  Abd:soft, nondistended. Bowel sounds present and normal  UE:No traumatic injuries, skin normal, + radial pulses and normal perfusion  LE:no traumatic injuries, skin normal, + DP pulses and normal perfusion  Skin: no rashes or ecchymoses      ED Course     ED Course     Procedures             EKG Interpretation:      Interpreted by An Kaye M.D.  Time reviewed: 19:09  Symptoms at time of EKG: Uncontrolled movement due to possible medication reaction   Rhythm: normal sinus   Rate: 83 BPM  Axis: Normal  Ectopy: none  Conduction: 400/470 ms  ST Segments/ T Waves: No acute ischemic changes  Q Waves: none  Comparison to prior: Unchanged    Clinical Impression: no acute changes      Critical Care time:  none             Labs Ordered and Resulted from Time of ED Arrival Up to the Time of Departure from the ED   CBC WITH PLATELETS DIFFERENTIAL - Abnormal; Notable for the following:        Result Value    WBC 15.7 (*)     Hemoglobin 11.4 (*)     MCHC 31.3 (*)     RDW 16.5 (*)     Absolute Neutrophil 11.5 (*)     All other components within normal limits   INR - Abnormal; Notable for the following:     INR 1.16 (*)     All other components within normal limits   COMPREHENSIVE METABOLIC PANEL - Abnormal; Notable for the following:     Sodium 145 (*)     Glucose 66 (*)     Urea Nitrogen 6 (*)     Calcium 8.2 (*)     Albumin 3.0 (*)     AST 50 (*)     All other components within normal limits   VALPROIC ACID - Abnormal; Notable for the following:     Valproic Acid Level <3 (*)     All other components within normal limits   CK TOTAL - Abnormal; Notable for the following:     CK Total 639 (*)     All other components within normal limits   DRUG ABUSE SCREEN 6 CHEM DEP URINE ([facility]) - Abnormal; Notable for the following:     Cocaine Qual Urine Positive (*)     All other components within normal limits   ROUTINE UA WITH MICROSCOPIC REFLEX TO CULTURE - Abnormal; Notable for the following:     Protein Albumin Urine 10  (*)     Mucous Urine Present (*)     Hyaline Casts 40 (*)     All other components within normal limits   GLUCOSE BY METER - Abnormal; Notable for the following:     Glucose 59 (*)     All other components within normal limits   GLUCOSE BY METER - Abnormal; Notable for the following:     Glucose 103 (*)     All other components within normal limits   GLUCOSE MONITOR NURSING POCT   AMMONIA   HCG QUALITATIVE   ALCOHOL ETHYL   PERIPHERAL IV CATHETER   PULSE OXIMETRY NURSING   CARDIAC CONTINUOUS MONITORING   STRAIGHT CATH FOR URINE       Consults  Neurology: Responded (05/31/18 1900)    Assessments & Plan (with Medical Decision Making)   37-year-old female presenting with uncontrolled motor movements and agitation with differential that includes a movement disorder, seizures, akathisia related to medications. Could also include agitated delirium secondary to illegal substances. On arrival here to the [facility], she is afebrile, heart rate in the 60s-80s, respiratory rate 10-12 with O2 saturations that are appropriate on room air. She has pinpoint pupils, but I am hesitant to give her any opiate reversal as she is breathing adequately and I am concerned that this would abruptly cause difficult to control agitation. She previously required sedation with haldol, ativan and ketamine on arrival to the [place] ED.   We will continue to monitor her for any signs of respiratory depression thought to be secondary to methadone.     Patient was seen by Neurology, please see their recommendations.   I reviewed laboratory testing done today which included a metabolic panel, ammonia, CK, CBC, serial glucose testing, UA, drug screen and head CT. Most notable for cocaine in the urine. White blood cell count of 15, slight CK increase to 639 and AST of 50, normal creatinine and electrolytes. Discussed with the Internal Medicine Triage Hospitalist and Dr. Schumacher from the MICU and we will admit her to the MICU for  monitoring tonight.    This part of the medical record was transcribed by Shila Mendez, Medical Scribe, from a dictation done by An Kaye MD.       Glucose 50s on recheck, started on D5 1/2 NS. Subsequent glucose ~70. Given 1 amp D50.     I have reviewed the nursing notes.    I have reviewed the findings, diagnosis, plan and need for follow up with the patient.    New Prescriptions    No medications on file       Final diagnoses:   Movement disorder - psychogenic vs dyskinesis vs temporal lobe seizure, etc.   Opioid dependence with intoxication with complication (H)   Substance abuse - cocaine       5/31/2018   [facility], [place], EMERGENCY DEPARTMENT    MD MARCO Hayes Katrina Anne, MD  06/01/18 0027

## 2018-05-31 NOTE — IP AVS SNAPSHOT
MRN:[id number]                      After Visit Summary   5/31/2018    Elaina Lin    MRN: [id number]           Thank you!     Thank you for choosing [facility] for your care. Our goal is always to provide you with excellent care. Hearing back from our patients is one way we can continue to improve our services. Please take a few minutes to complete the written survey that you may receive in the mail after you visit with us. Thank you!        Patient Information     Date Of Birth          1980        About your hospital stay     You were admitted on:  June 1, 2018 You last received care in the:  [address] [facility]    You were discharged on:  June 5, 2018        Reason for your hospital stay       You were hospitalized for abnormal body movements likely due to combination of methadone and cocaine. You clinically improved and was discharged to home with plans for chemical dependency treatment.                  Who to Call     For medical emergencies, please call 911.  For non-urgent questions about your medical care, please call your primary care provider or clinic, None          Attending Provider     Provider Specialty    Ciaran Pressley MD Emergency Medicine    An Kaye MD Emergency Medicine    Reynaldo Schumacher MD [facility]    Kaela Leavitt DO [facility]       Primary Care Provider Fax #    Physician No Ref-Primary [phone]      After Care Instructions     Activity       Your activity upon discharge: activity as tolerated            Diet       Follow this diet upon discharge: Orders Placed This Encounter      Regular Diet Adult                  Follow-up Appointments     Adult [facility] Follow-up and recommended labs and tests       Follow up with primary care provider, at [facility], within 7 days for hospital follow- up.  No follow up labs or test are needed.      Appointments on [facility] [place] and/or [facility] (with [facility] or [facility]  "provider or service). Call [phone] if you haven't heard regarding these appointments within 7 days of discharge.                  Further instructions from your care team       Please go to [facility] and speak to a financial  regarding your Medical Assistance application.  Reference case number [id number], application date 4-23-18.  According to the [facility] records you applied under the last name Niko.   They need you to produce a birth certificate and name change documents to process the application any further.        Pending Results     No orders found from 5/29/2018 to 6/1/2018.            Statement of Approval     Ordered          06/04/18 1706  I have reviewed and agree with all the recommendations and orders detailed in this document.  EFFECTIVE NOW     Approved and electronically signed by:  Tanvi Russell MD             Admission Information     Date & Time Provider Department Dept. Phone    5/31/2018 Kaela Leavitt DO [address] [facility] [phone]      Your Vitals Were     Blood Pressure Pulse Temperature Respirations Height Weight    103/71 (BP Location: Right arm) 65 95.1  F (35.1  C) (Oral) 18 1.651 m (5' 5\") 64.8 kg (142 lb 12.8 oz)    Pulse Oximetry BMI (Body Mass Index)                96% 23.76 kg/m2          [facility] Information     [employer] gives you secure access to your electronic health record. If you see a primary care provider, you can also send messages to your care team and make appointments. If you have questions, please call your primary care clinic.  If you do not have a primary care provider, please call [phone] and they will assist you.        Care EveryWhere ID     This is your Care EveryWhere ID. This could be used by other organizations to access your [place] medical records  [id number]        Equal Access to Services     GUZMAN VANN AH: ochoa Perkins, mónica rosales " carolinakengary green'aan ah. So [facility] [phone].    ATENCIÓN: Si macario hunter, tiene a thompson disposición servicios gratuitos de asistencia lingüística. Addis al [phone].    We comply with applicable federal civil rights laws and [place] laws. We do not discriminate on the basis of race, color, national origin, age, disability, sex, sexual orientation, or gender identity.               Review of your medicines      START taking        Dose / Directions    albuterol 108 (90 Base) MCG/ACT Inhaler   Commonly known as:  PROAIR HFA/PROVENTIL HFA/VENTOLIN HFA   Used for:  Cough        Dose:  2 puff   Inhale 2 puffs into the lungs 4 times daily as needed for other (dyspnea)   Quantity:  8 g   Refills:  0       diphenhydrAMINE 25 MG capsule   Commonly known as:  BENADRYL   Used for:  Opioid dependence with intoxication with complication (H)        Dose:  25-50 mg   Take 1-2 capsules (25-50 mg) by mouth every 6 hours as needed for itching   Quantity:  56 capsule   Refills:  0       divalproex sodium extended-release 250 MG 24 hr tablet   Commonly known as:  DEPAKOTE ER   Used for:  Bipolar affective disorder, remission status unspecified (H)   Replaces:  DEPAKOTE PO        Dose:  1250 mg   Take 5 tablets (1,250 mg) by mouth At Bedtime   Quantity:  150 tablet   Refills:  0       ondansetron 4 MG ODT tab   Commonly known as:  ZOFRAN-ODT   Used for:  Substance abuse        Dose:  4 mg   Take 1 tablet (4 mg) by mouth every 6 hours as needed for nausea or vomiting   Quantity:  30 tablet   Refills:  0       senna-docusate 8.6-50 MG per tablet   Commonly known as:  SENOKOT-S;PERICOLACE   Used for:  Opioid dependence with intoxication with complication (H)        Dose:  1 tablet   Take 1 tablet by mouth 2 times daily as needed for constipation   Quantity:  100 tablet   Refills:  0         CONTINUE these medicines which may have CHANGED, or have new prescriptions. If we are uncertain of the size of tablets/capsules you have at home,  strength may be listed as something that might have changed.        Dose / Directions    amLODIPine 5 MG tablet   Commonly known as:  NORVASC   This may have changed:    - medication strength  - how much to take  - when to take this   Used for:  HTN, goal below 140/90        Dose:  5 mg   Take 1 tablet (5 mg) by mouth daily   Quantity:  30 tablet   Refills:  0       QUEtiapine 25 MG tablet   Commonly known as:  SEROquel   This may have changed:  Another medication with the same name was removed. Continue taking this medication, and follow the directions you see here.   Used for:  Bipolar affective disorder, remission status unspecified (H)        Dose:  25 mg   Take 1 tablet (25 mg) by mouth daily   Quantity:  30 tablet   Refills:  0         CONTINUE these medicines which have NOT CHANGED        Dose / Directions    EPINEPHrine 0.3 MG/0.3ML injection 2-pack   Commonly known as:  EPIPEN/ADRENACLICK/or ANY BX GENERIC EQUIV   Used for:  Anaphylactic shock due to food, subsequent encounter        Dose:  0.3 mg   Inject 0.3 mLs (0.3 mg) into the muscle once as needed for anaphylaxis   Quantity:  0.6 mL   Refills:  1       ferrous sulfate 325 (65 Fe) MG tablet   Commonly known as:  IRON   Used for:  Iron deficiency anemia secondary to inadequate dietary iron intake        Dose:  325 mg   Take 1 tablet (325 mg) by mouth daily (with breakfast)   Quantity:  30 tablet   Refills:  0       methadone 5 MG/5ML solution   Commonly known as:  DOLPHINE   Indication:  Opioid Dependence        Dose:  40 mg   Take 40 mg by mouth daily   Refills:  0       naloxone 1 mg/mL for intranasal kit (2 syringes with 2 mucosal atomizer device)   Commonly known as:  NARCAN   Used for:  Opioid use disorder, severe, dependence (H)        In opioid overdose put cone in nostril and push 1/2 of contents into each nostril.  Repeat every 3 min if no response until help arrives.   Quantity:  1 kit   Refills:  0         STOP taking     DEPAKOTE PO    Replaced by:  divalproex sodium extended-release 250 MG 24 hr tablet           LABETALOL HCL PO                Where to get your medicines      These medications were sent to [facility] - [place], [place] - [address]  [address] [address]     Phone:  [phone]     albuterol 108 (90 Base) MCG/ACT Inhaler    amLODIPine 5 MG tablet    diphenhydrAMINE 25 MG capsule    divalproex sodium extended-release 250 MG 24 hr tablet    ferrous sulfate 325 (65 Fe) MG tablet    ondansetron 4 MG ODT tab    QUEtiapine 25 MG tablet    senna-docusate 8.6-50 MG per tablet                Protect others around you: Learn how to safely use, store and throw away your medicines at www.disposemymeds.org.        Information about OPIOIDS     PRESCRIPTION OPIOIDS: WHAT YOU NEED TO KNOW   You have a prescription for an opioid (narcotic) pain medicine. Opioids can cause addiction. If you have a history of chemical dependency of any type, you are at a higher risk of becoming addicted to opioids. Only take this medicine after all other options have been tried. Take it for as short a time and as few doses as possible.     Do not:    Drive. If you drive while taking these medicines, you could be arrested for driving under the influence (DUI).    Operate heavy machinery    Do any other dangerous activities while taking these medicines.     Drink any alcohol while taking these medicines.      Take with any other medicines that contain acetaminophen. Read all labels carefully. Look for the word  acetaminophen  or  Tylenol.  Ask your pharmacist if you have questions or are unsure.    Store your pills in a secure place, locked if possible. We will not replace any lost or stolen medicine. If you don t finish your medicine, please throw away (dispose) as directed by your pharmacist. The [place] Pollution Control Agency has more information about safe disposal:  https://www.pca.[facility].[place].us/living-green/managing-unwanted-medications    All opioids tend to cause constipation. Drink plenty of water and eat foods that have a lot of fiber, such as fruits, vegetables, prune juice, apple juice and high-fiber cereal. Take a laxative (Miralax, milk of magnesia, Colace, Senna) if you don t move your bowels at least every other day.              Medication List: This is a list of all your medications and when to take them. Check marks below indicate your daily home schedule. Keep this list as a reference.      Medications           Morning Afternoon Evening Bedtime As Needed    albuterol 108 (90 Base) MCG/ACT Inhaler   Commonly known as:  PROAIR HFA/PROVENTIL HFA/VENTOLIN HFA   Inhale 2 puffs into the lungs 4 times daily as needed for other (dyspnea)   Last time this was given:  2 puffs on 6/5/2018  9:20 AM                                amLODIPine 5 MG tablet   Commonly known as:  NORVASC   Take 1 tablet (5 mg) by mouth daily   Last time this was given:  10 mg on 6/4/2018 11:21 AM                                diphenhydrAMINE 25 MG capsule   Commonly known as:  BENADRYL   Take 1-2 capsules (25-50 mg) by mouth every 6 hours as needed for itching   Last time this was given:  50 mg on 6/5/2018 12:02 AM                                divalproex sodium extended-release 250 MG 24 hr tablet   Commonly known as:  DEPAKOTE ER   Take 5 tablets (1,250 mg) by mouth At Bedtime   Last time this was given:  1,250 mg on 6/4/2018  9:54 PM                                EPINEPHrine 0.3 MG/0.3ML injection 2-pack   Commonly known as:  EPIPEN/ADRENACLICK/or ANY BX GENERIC EQUIV   Inject 0.3 mLs (0.3 mg) into the muscle once as needed for anaphylaxis                                ferrous sulfate 325 (65 Fe) MG tablet   Commonly known as:  IRON   Take 1 tablet (325 mg) by mouth daily (with breakfast)                                methadone 5 MG/5ML solution   Commonly known as:  DOLPHINE   Take 40 mg by  mouth daily   Last time this was given:  40 mg on 6/5/2018  9:37 AM                                naloxone 1 mg/mL for intranasal kit (2 syringes with 2 mucosal atomizer device)   Commonly known as:  NARCAN   In opioid overdose put cone in nostril and push 1/2 of contents into each nostril.  Repeat every 3 min if no response until help arrives.                                ondansetron 4 MG ODT tab   Commonly known as:  ZOFRAN-ODT   Take 1 tablet (4 mg) by mouth every 6 hours as needed for nausea or vomiting   Last time this was given:  4 mg on 6/5/2018  9:13 AM                                QUEtiapine 25 MG tablet   Commonly known as:  SEROquel   Take 1 tablet (25 mg) by mouth daily                                senna-docusate 8.6-50 MG per tablet   Commonly known as:  SENOKOT-S;PERICOLACE   Take 1 tablet by mouth 2 times daily as needed for constipation   Last time this was given:  1 tablet on 6/4/2018  9:54 PM

## 2018-05-31 NOTE — ED NOTES
Pt transferred by [facility] from [facility]. No changes or events during transport. Pt follows commands and alert upon calling of name.

## 2018-05-31 NOTE — ED NOTES
Cardiac monitor applied--sinus rhythm.  Patient woke during application of cardiac monitor but was able to be soothed by significant other.

## 2018-06-01 PROBLEM — G25.71 AKATHISIA: Status: ACTIVE | Noted: 2018-06-01

## 2018-06-01 LAB
ALBUMIN SERPL-MCNC: 2.3 G/DL (ref 3.4–5)
ALP SERPL-CCNC: 45 U/L (ref 40–150)
ALT SERPL W P-5'-P-CCNC: 27 U/L (ref 0–50)
ANION GAP SERPL CALCULATED.3IONS-SCNC: 8 MMOL/L (ref 3–14)
AST SERPL W P-5'-P-CCNC: 26 U/L (ref 0–45)
BASE EXCESS BLDV CALC-SCNC: 0.5 MMOL/L
BILIRUB SERPL-MCNC: 0.2 MG/DL (ref 0.2–1.3)
BUN SERPL-MCNC: 6 MG/DL (ref 7–30)
CALCIUM SERPL-MCNC: 7 MG/DL (ref 8.5–10.1)
CHLORIDE SERPL-SCNC: 112 MMOL/L (ref 94–109)
CO2 SERPL-SCNC: 26 MMOL/L (ref 20–32)
CREAT SERPL-MCNC: 0.83 MG/DL (ref 0.52–1.04)
ERYTHROCYTE [DISTWIDTH] IN BLOOD BY AUTOMATED COUNT: 17.1 % (ref 10–15)
GFR SERPL CREATININE-BSD FRML MDRD: 77 ML/MIN/1.7M2
GLUCOSE BLDC GLUCOMTR-MCNC: 121 MG/DL (ref 70–99)
GLUCOSE BLDC GLUCOMTR-MCNC: 172 MG/DL (ref 70–99)
GLUCOSE BLDC GLUCOMTR-MCNC: 67 MG/DL (ref 70–99)
GLUCOSE BLDC GLUCOMTR-MCNC: 71 MG/DL (ref 70–99)
GLUCOSE BLDC GLUCOMTR-MCNC: 76 MG/DL (ref 70–99)
GLUCOSE BLDC GLUCOMTR-MCNC: 78 MG/DL (ref 70–99)
GLUCOSE BLDC GLUCOMTR-MCNC: 79 MG/DL (ref 70–99)
GLUCOSE BLDC GLUCOMTR-MCNC: 80 MG/DL (ref 70–99)
GLUCOSE SERPL-MCNC: 82 MG/DL (ref 70–99)
HCO3 BLDV-SCNC: 27 MMOL/L (ref 21–28)
HCT VFR BLD AUTO: 34.3 % (ref 35–47)
HGB BLD-MCNC: 10.7 G/DL (ref 11.7–15.7)
INTERPRETATION ECG - MUSE: NORMAL
MCH RBC QN AUTO: 27.9 PG (ref 26.5–33)
MCHC RBC AUTO-ENTMCNC: 31.2 G/DL (ref 31.5–36.5)
MCV RBC AUTO: 90 FL (ref 78–100)
MRSA DNA SPEC QL NAA+PROBE: NEGATIVE
O2/TOTAL GAS SETTING VFR VENT: 21 %
PCO2 BLDV: 49 MM HG (ref 40–50)
PH BLDV: 7.35 PH (ref 7.32–7.43)
PLATELET # BLD AUTO: 284 10E9/L (ref 150–450)
PO2 BLDV: 57 MM HG (ref 25–47)
POTASSIUM SERPL-SCNC: 3.3 MMOL/L (ref 3.4–5.3)
PROT SERPL-MCNC: 5.5 G/DL (ref 6.8–8.8)
RBC # BLD AUTO: 3.83 10E12/L (ref 3.8–5.2)
SODIUM SERPL-SCNC: 145 MMOL/L (ref 133–144)
SPECIMEN SOURCE: NORMAL
WBC # BLD AUTO: 8.8 10E9/L (ref 4–11)

## 2018-06-01 PROCEDURE — HZ2ZZZZ DETOXIFICATION SERVICES FOR SUBSTANCE ABUSE TREATMENT: ICD-10-PCS | Performed by: FAMILY MEDICINE

## 2018-06-01 PROCEDURE — 99207 ZZC APP CREDIT; MD BILLING SHARED VISIT: CPT | Performed by: INTERNAL MEDICINE

## 2018-06-01 PROCEDURE — 85027 COMPLETE CBC AUTOMATED: CPT | Performed by: INTERNAL MEDICINE

## 2018-06-01 PROCEDURE — 40000556 ZZH STATISTIC PERIPHERAL IV START W US GUIDANCE

## 2018-06-01 PROCEDURE — 25000128 H RX IP 250 OP 636: Performed by: STUDENT IN AN ORGANIZED HEALTH CARE EDUCATION/TRAINING PROGRAM

## 2018-06-01 PROCEDURE — 25000132 ZZH RX MED GY IP 250 OP 250 PS 637: Performed by: STUDENT IN AN ORGANIZED HEALTH CARE EDUCATION/TRAINING PROGRAM

## 2018-06-01 PROCEDURE — 25000125 ZZHC RX 250: Performed by: INTERNAL MEDICINE

## 2018-06-01 PROCEDURE — 87640 STAPH A DNA AMP PROBE: CPT | Performed by: SURGERY

## 2018-06-01 PROCEDURE — 80053 COMPREHEN METABOLIC PANEL: CPT | Performed by: STUDENT IN AN ORGANIZED HEALTH CARE EDUCATION/TRAINING PROGRAM

## 2018-06-01 PROCEDURE — 99291 CRITICAL CARE FIRST HOUR: CPT | Mod: GC | Performed by: SURGERY

## 2018-06-01 PROCEDURE — 82803 BLOOD GASES ANY COMBINATION: CPT | Performed by: INTERNAL MEDICINE

## 2018-06-01 PROCEDURE — 80053 COMPREHEN METABOLIC PANEL: CPT | Performed by: INTERNAL MEDICINE

## 2018-06-01 PROCEDURE — 36415 COLL VENOUS BLD VENIPUNCTURE: CPT | Performed by: INTERNAL MEDICINE

## 2018-06-01 PROCEDURE — 25800025 ZZH RX 258: Performed by: EMERGENCY MEDICINE

## 2018-06-01 PROCEDURE — 25000128 H RX IP 250 OP 636: Performed by: INTERNAL MEDICINE

## 2018-06-01 PROCEDURE — 99222 1ST HOSP IP/OBS MODERATE 55: CPT | Performed by: PSYCHIATRY & NEUROLOGY

## 2018-06-01 PROCEDURE — 87641 MR-STAPH DNA AMP PROBE: CPT | Performed by: SURGERY

## 2018-06-01 PROCEDURE — 00000146 ZZHCL STATISTIC GLUCOSE BY METER IP

## 2018-06-01 PROCEDURE — 12000001 ZZH R&B MED SURG/OB UMMC

## 2018-06-01 RX ORDER — ACETAMINOPHEN 325 MG/1
650 TABLET ORAL EVERY 4 HOURS PRN
Status: DISCONTINUED | OUTPATIENT
Start: 2018-06-01 | End: 2018-06-05 | Stop reason: HOSPADM

## 2018-06-01 RX ORDER — DIAZEPAM 5 MG
5-20 TABLET ORAL SEE ADMIN INSTRUCTIONS
Status: DISCONTINUED | OUTPATIENT
Start: 2018-06-01 | End: 2018-06-05 | Stop reason: HOSPADM

## 2018-06-01 RX ORDER — POTASSIUM CHLORIDE 1.5 G/1.58G
40 POWDER, FOR SOLUTION ORAL ONCE
Status: COMPLETED | OUTPATIENT
Start: 2018-06-01 | End: 2018-06-01

## 2018-06-01 RX ORDER — METHADONE HYDROCHLORIDE 5 MG/5ML
40 SOLUTION ORAL DAILY
Status: ON HOLD | COMMUNITY
End: 2020-11-14

## 2018-06-01 RX ORDER — DIAZEPAM 10 MG/2ML
5-20 INJECTION, SOLUTION INTRAMUSCULAR; INTRAVENOUS SEE ADMIN INSTRUCTIONS
Status: DISCONTINUED | OUTPATIENT
Start: 2018-06-01 | End: 2018-06-05 | Stop reason: HOSPADM

## 2018-06-01 RX ORDER — NALOXONE HYDROCHLORIDE 0.4 MG/ML
.1-.4 INJECTION, SOLUTION INTRAMUSCULAR; INTRAVENOUS; SUBCUTANEOUS
Status: DISCONTINUED | OUTPATIENT
Start: 2018-06-01 | End: 2018-06-05 | Stop reason: HOSPADM

## 2018-06-01 RX ORDER — NICOTINE POLACRILEX 4 MG
15-30 LOZENGE BUCCAL
Status: DISCONTINUED | OUTPATIENT
Start: 2018-06-01 | End: 2018-06-05 | Stop reason: HOSPADM

## 2018-06-01 RX ORDER — LOPERAMIDE HCL 2 MG
2 CAPSULE ORAL 4 TIMES DAILY PRN
Status: DISCONTINUED | OUTPATIENT
Start: 2018-06-01 | End: 2018-06-05 | Stop reason: HOSPADM

## 2018-06-01 RX ORDER — DEXTROSE MONOHYDRATE 25 G/50ML
50 INJECTION, SOLUTION INTRAVENOUS ONCE
Status: COMPLETED | OUTPATIENT
Start: 2018-06-01 | End: 2018-06-01

## 2018-06-01 RX ORDER — IBUPROFEN 200 MG
200 TABLET ORAL EVERY 6 HOURS PRN
Status: DISCONTINUED | OUTPATIENT
Start: 2018-06-01 | End: 2018-06-03

## 2018-06-01 RX ORDER — DIPHENHYDRAMINE HYDROCHLORIDE 50 MG/ML
50 INJECTION INTRAMUSCULAR; INTRAVENOUS EVERY 6 HOURS PRN
Status: DISCONTINUED | OUTPATIENT
Start: 2018-06-01 | End: 2018-06-03

## 2018-06-01 RX ORDER — DEXTROSE MONOHYDRATE 25 G/50ML
25-50 INJECTION, SOLUTION INTRAVENOUS
Status: DISCONTINUED | OUTPATIENT
Start: 2018-06-01 | End: 2018-06-05 | Stop reason: HOSPADM

## 2018-06-01 RX ORDER — QUETIAPINE FUMARATE 25 MG/1
25 TABLET, FILM COATED ORAL DAILY
Status: ON HOLD | COMMUNITY
End: 2018-06-04

## 2018-06-01 RX ORDER — POTASSIUM CHLORIDE 7.45 MG/ML
10 INJECTION INTRAVENOUS
Status: DISCONTINUED | OUTPATIENT
Start: 2018-06-01 | End: 2018-06-01

## 2018-06-01 RX ORDER — FERROUS SULFATE 325(65) MG
325 TABLET ORAL
Status: ON HOLD | COMMUNITY
End: 2018-06-04

## 2018-06-01 RX ORDER — POTASSIUM CL/LIDO/0.9 % NACL 10MEQ/0.1L
10 INTRAVENOUS SOLUTION, PIGGYBACK (ML) INTRAVENOUS
Status: DISCONTINUED | OUTPATIENT
Start: 2018-06-01 | End: 2018-06-01

## 2018-06-01 RX ADMIN — POTASSIUM CHLORIDE 10 MEQ: 7.46 INJECTION, SOLUTION INTRAVENOUS at 09:34

## 2018-06-01 RX ADMIN — FOLIC ACID: 5 INJECTION, SOLUTION INTRAMUSCULAR; INTRAVENOUS; SUBCUTANEOUS at 02:40

## 2018-06-01 RX ADMIN — POTASSIUM CHLORIDE 40 MEQ: 1.5 POWDER, FOR SOLUTION ORAL at 12:53

## 2018-06-01 RX ADMIN — DEXTROSE MONOHYDRATE 50 ML: 25 INJECTION, SOLUTION INTRAVENOUS at 00:23

## 2018-06-01 NOTE — PLAN OF CARE
Problem: Patient Care Overview  Goal: Plan of Care/Patient Progress Review  Outcome: No Change  D: Here with concerns regarding uncontrolled body movements     I/A: Afebrile, VSS. Sleeping deeply but rousable with repeated encouragement. Oriented to person, place and situation. Pupils 1, reactive. Difficult to get any answers to questions because she falls right back to sleep. Moves all extremities and able to move self in bed.  No cardiac issues. Lungs clear, room air.   MD indicates that patient had voided prior to leaving the ED around 0100- bladder scanned at 0200 for 55.  Has left PIV- banana bag infusing. Patient has been having low BG's and was as low as 55 in the ED. Lowest BG in the ICU has been 71. She will need to be awake enough to start eating or have some form of IV sugar in order to keep her BG's up.  Significant other indicates that they are both concerned about making sure she can be treated for addiction before going back home. He requested to be able to speak to a .    P: Continue to monitor BG's and neuros Q2

## 2018-06-01 NOTE — H&P
MICU History and Physical  Elaina Lin MRN: [id number]  1980  Date of Admission:5/31/2018  Primary care provider: No Ref-Primary, Physician      Assessment and Plan:     37 year old female h/o anxiety and depression as well as substance use (heroin, alcohol, crack) on methadone maintenance therapy presenting with uncontrolled body movements and altered mental status.     PLAN:  ===NEURO===  # Akathisia  # Bipolar disorder  Patient presented with uncontrolled body movements and altered mental status. Neurology assessed patient in the ED and felt consistent with akathisia possibly secondary to methadone. She was agitated in the ED at received ketamine, ativan, haldol for this and has since been sedated. Will monitor closely overnight. Per neurology would avoid dopamine antagonists.   - HOLDING seroquel  - HOLDING depakote (note discrepancy in dosing, will clarify in the AM)  - IV benadryl PRN for abnormal movements    # Polysubstance abuse  Patient in outpatient program per marcela. Last drink was two days ago, unclear amount but has cut back from prior liter per day. Discontinuing methadone given possible akathisia per above. Anticipate opioid withdrawal and will provide non-opioid PRNS for symptom management.   - MSSA protocol   - PRN acetaminophen, ibuprofen  - PRN loperamide  - Social work consult in the AM    ===CARDIOVASCULAR===  # H/O HTN  Unclear medications pta, appears that [place] [facility] [place]  - Monitor    ===PULMONARY===  No acute issues. Will monitor closely for respiratory depression, airway protection overnight. At present she has a good cough, responds to voice.     ===GASTROINTESTINAL===  No acute issues.     ===RENAL===  No acute issues.     ===HEME/ONC===  # Chronic anemia  She is at her baseline (9-11).   - Monitor     ===ENDOCRINE===  # Hypoglycemia  Patient NPO while somnolent overnight. Will provide fluids with D5 NS.   - D5 NS  - Hypoglycemia protocol  "    ===INFECTIOUS DISEASE===  No acute issues.     ===SKIN/MSK===  No acute issues.     Prophylaxis:  DVT: Ambulate   GI: Not indicated  Family:  Updated at bedside  Disposition: Critically Ill    Code Status: FULL    Patient was seen and discussed with attending physician Dr. Schumacher, who agrees with above assessment and plan.    Hollie Garg  Internal Medicine PGY-2  [id number]         Chief Complaint:   Body movements         History of Present Illness:   37 year old female h/o anxiety and depression as well as substance use (heroin, alcohol, crack) on methadone maintenance therapy presenting with uncontrolled body movements and altered mental status.     The patient was unable to provide history and is supplemented by chart review.     She has been having uncontrolled body movements and mental status changes that started the day she began methadone thearpy. She has a history of heroin use - she went to long term and was clean, discharged to treatment. She is now getting methadone through [place]. He thinks that when she takes her methadone dose she gets \"weird\" thirty minutes later. He describes and demonstrates uncontrolled movements, rocking, swinging her arms. She appears to understand him but responds in one to two word phrases. There is no associated tongue biting, jerking, urinary incontinence. This lasts until the same evening but can last until the next morning. She did not take methadone Sat/Sun/Mon. over Memorial Day and during that time she was her normal self without any abnormal movements. She has panic attacks characterized by crying and hyperventilation that are clearly different form these episodes.      He reports she has used drugs for roughly ten years. She typically snorts heroin, but is in outpatient treatment now. She is using crack, and UDS is positive. She binge drinks as well, starting early in the morning. She does not use marijuana. She has been on Seroquel 25 mg qhs PRN for many years, but she " only uses it when she's clean and trying to sleep well. She takes VPA for mood.      [place] [facility] [place] reported having teary eyes, rhinorrhea and itchiness daily when she takes the methadone. She then had respiratory depression and was started on Narcan gtt for opiate overdose.      She visited  where she was crying/yelling and nonconversive but left the hospital before being seen. She returned  where her movements where felt to be akithisia - she was treated with benadryl IV and benztropine PO, sent home on benztropine 1 mg BID PO.     In the ED, the patient received ketamine (200 mg), ativan and haldol for acute agitation. She was assessed by neurology. She had a CT and labs drawn.          Review of Systems:   Unable to perform due to patient condition.          Past Medical History:   Medical History reviewed.   Past Medical History:   Diagnosis Date     Genital herpes 2012     IMO update changed this record. Please review for accuracy     H/O: substance abuse 2013     Postpartum depression 10/21/2011     Recur major depress, severe 2012     Substance abuse              Past Surgical History:   Surgical History reviewed.   Past Surgical History:   Procedure Laterality Date      SECTION  2011 and 13     HERNIA REPAIR       REPAIR TENDON HAND               Social History:   Social History reviewed.  Social History   Substance Use Topics     Smoking status: Current Every Day Smoker     Packs/day: 1.00     Types: Cigarettes     Smokeless tobacco: Never Used      Comment: about 1 cigarettes per day     Alcohol use Yes      Comment: 1 liter vodka per day             Family History:   Family History reviewed.   Family History   Problem Relation Age of Onset     Asthma Mother      DIABETES Mother      Allergies Mother      Psychotic Disorder Mother      schitzophrenic     DIABETES Maternal Grandmother      HEART DISEASE Maternal Grandmother      triple bypass      Eye Disorder Maternal Grandmother      cataracts     Hypertension Maternal Grandmother      Unknown/Adopted Maternal Grandfather      Unknown/Adopted Paternal Grandmother      Unknown/Adopted Paternal Grandfather      CANCER Sister      leukemia             Allergies:     Allergies   Allergen Reactions     Tomato Anaphylaxis     Compazine [Prochlorperazine]              Medications:   Medications Reviewed.   Current Facility-Administered Medications   Medication     flumazenil (ROMAZICON) injection 0.2 mg     haloperidol lactate (HALDOL) injection 2 mg     sodium chloride (PF) 0.9% PF flush 3 mL     sodium chloride (PF) 0.9% PF flush 3 mL     Current Outpatient Prescriptions   Medication Sig     BusPIRone HCl (BUSPAR PO) Patient unsure of dosage     Divalproex Sodium (DEPAKOTE PO) 250 mg in the AM and 500 mg at night     EPINEPHrine (EPIPEN/ADRENACLICK/OR ANY BX GENERIC EQUIV) 0.3 MG/0.3ML injection 2-pack Inject 0.3 mLs (0.3 mg) into the muscle once as needed for anaphylaxis     naloxone (NARCAN) 1 mg/mL for intranasal kit (2 syringes with 2 mucosal atomizer device) In opioid overdose put cone in nostril and push 1/2 of contents into each nostril.  Repeat every 3 min if no response until help arrives.     NIFEdipine osmotic (PROCARDIA XL) 30 MG 24 hr tablet Take 1 tablet (30 mg) by mouth daily     QUEtiapine (SEROQUEL) 100 MG tablet Take by mouth At Bedtime             Physical Exam:   Vitals were reviewed.  Blood pressure 110/77, pulse 89, temperature 96.3  F (35.7  C), temperature source Axillary, resp. rate 10, weight 71.2 kg (157 lb), SpO2 96 %, not currently breastfeeding.    General: Somnolent, supine in bed, NAD.   Skin: Not jaundiced, no rash, no ecchymoses.   HEENT: MMM, EOM intact, +miosis ~2 mm bilaterally.   CV: RRR, normal S1S2, no murmur, clicks, rubs  Resp: Clear to auscultation bilaterally, no wheezes, rhonchi  Abd: Soft, non-tender, BS+, no masses appreciated  Extremities: warm and well  perfused, palpable pulses, no edema  Neuro: Arouses to voice. Following commands. Moving all extremities. Good cough.          Data:     CMP  Recent Labs  Lab 05/31/18  1321   *   POTASSIUM 3.7   CHLORIDE 108   CO2 27   ANIONGAP 10   GLC 66*   BUN 6*   CR 0.98   GFRESTIMATED 64   GFRESTBLACK 78   JOHANNY 8.2*   PROTTOTAL 6.8   ALBUMIN 3.0*   BILITOTAL 0.3   ALKPHOS 53   AST 50*   ALT 34     CBC  Recent Labs  Lab 05/31/18  1321   WBC 15.7*   RBC 4.11   HGB 11.4*   HCT 36.4   MCV 89   MCH 27.7   MCHC 31.3*   RDW 16.5*        INR  Recent Labs  Lab 05/31/18  1321   INR 1.16*     Arterial Blood GasNo lab results found in last 7 days.         Asthma with acute exacerbation, unspecified asthma severity, unspecified whether persistent

## 2018-06-01 NOTE — PROGRESS NOTES
MICU Progress Note  Elaina Lin MRN: [id number]  1980  Date of Admission:5/31/2018      Assessment and Plan:     37 year old female h/o anxiety and depression as well as substance use (heroin, alcohol, crack) on methadone maintenance therapy presenting with uncontrolled body movements and altered mental status.     PLAN:  ===NEURO===  # Akathisia  # Bipolar disorder  Patient presented with uncontrolled body movements and altered mental status. Neurology assessed patient in the ED and felt consistent with akathisia possibly secondary to methadone. She was agitated in the ED at received ketamine, ativan, haldol for this and has since been sedated. Will monitor closely overnight. Per neurology would avoid dopamine antagonists.   - Neurology consulted, appreciate recs  - HOLDING seroquel  - HOLDING depakote (note discrepancy in dosing, will clarify in the AM)  - IV benadryl PRN for abnormal movements    # Polysubstance abuse  Patient in outpatient program per marcela. Last drink was two days ago, unclear amount but has cut back from prior liter per day. Discontinuing methadone given possible akathisia per above. Anticipate opioid withdrawal and will provide non-opioid PRNS for symptom management.   - MSSA protocol   - PRN acetaminophen, ibuprofen  - PRN loperamide  - Social work consult in the AM    ===CARDIOVASCULAR===  # H/O HTN  Unclear medications pta, appears that had a [place] [facility] [place]  - Monitor    ===PULMONARY===  No acute issues. Will monitor closely for respiratory depression, airway protection overnight. At present she has a good cough, responds to voice.   - no current indication for intubation     ===GASTROINTESTINAL===  No acute issues.     ===RENAL===  No acute issues.     ===HEME/ONC===  # Chronic anemia  She is at her baseline (9-11).   - Monitor     ===ENDOCRINE===  # Hypoglycemia  Patient NPO while somnolent overnight. Overnight, received fluids with D5 NS.   - regular  diet   - Hypoglycemia protocol     ===INFECTIOUS DISEASE===  No acute issues.     ===SKIN/MSK===  No acute issues.     Prophylaxis:  DVT: Ambulate   GI: Not indicated  Family:  Updated at bedside  Disposition: stable for transfer to medicine floor     Code Status: FULL    Patient was seen and discussed with attending physician Dr. Perlman, who agrees with above assessment and plan.    Susan Curtis MD  Internal Medicine, PGY-1  [id number]         Interval History:   Patient admitted overnight; see H&P for details. This morning, patient is quite sleepy but arouses to voice. Her significant other at bedside again requests chem dep to see her before discharge. So far, she has not displayed signs of alcohol withdrawal. She is not currently displaying signs of abnormal body movements.          Past Medical History:   Medical History reviewed.   Past Medical History:   Diagnosis Date     Genital herpes 2012     IMO update changed this record. Please review for accuracy     H/O: substance abuse 2013     Postpartum depression 10/21/2011     Recur major depress, severe 2012     Substance abuse              Past Surgical History:   Surgical History reviewed.   Past Surgical History:   Procedure Laterality Date      SECTION  2011 and 13     HERNIA REPAIR       REPAIR TENDON HAND               Social History:   Social History reviewed.  Social History   Substance Use Topics     Smoking status: Current Every Day Smoker     Packs/day: 1.00     Types: Cigarettes     Smokeless tobacco: Never Used      Comment: about 1 cigarettes per day     Alcohol use Yes      Comment: 1 liter vodka per day             Family History:   Family History reviewed.   Family History   Problem Relation Age of Onset     Asthma Mother      DIABETES Mother      Allergies Mother      Psychotic Disorder Mother      schitzophrenic     DIABETES Maternal Grandmother      HEART DISEASE Maternal Grandmother      triple bypass     Eye  "Disorder Maternal Grandmother      cataracts     Hypertension Maternal Grandmother      Unknown/Adopted Maternal Grandfather      Unknown/Adopted Paternal Grandmother      Unknown/Adopted Paternal Grandfather      CANCER Sister      leukemia             Allergies:     Allergies   Allergen Reactions     Tomato Anaphylaxis     Compazine [Prochlorperazine]              Medications:   Medications Reviewed.   Current Facility-Administered Medications   Medication     acetaminophen (TYLENOL) tablet 650 mg     dextrose 5% and 0.45% NaCl 5-0.45 % infusion     glucose gel 15-30 g    Or     dextrose 50 % injection 25-50 mL    Or     glucagon injection 1 mg     diazepam (VALIUM) injection 5-20 mg    Or     diazepam (VALIUM) tablet 5-20 mg     diphenhydrAMINE (BENADRYL) injection 50 mg     ibuprofen (ADVIL/MOTRIN) tablet 200 mg     loperamide (IMODIUM) capsule 2 mg     naloxone (NARCAN) injection 0.1-0.4 mg     potassium chloride (KLOR-CON) Packet 40 mEq     sodium chloride (PF) 0.9% PF flush 3 mL     sodium chloride (PF) 0.9% PF flush 3 mL             Physical Exam:   Vitals were reviewed.  Blood pressure (!) 139/105, pulse 89, temperature 96.6  F (35.9  C), temperature source Axillary, resp. rate 13, height 1.651 m (5' 5\"), weight 65.4 kg (144 lb 2.9 oz), SpO2 99 %, not currently breastfeeding.    General: sleepy, wakes up intermittently to voice, appears comfortable   HEENT: MMM, EOM intact, +miosis ~2 mm bilaterally.   CV: RRR, normal S1S2, no murmurs/rubs   Resp: Clear to auscultation bilaterally, no wheezes, rhonchi  Abd: Soft, non-tender, BS+, no masses appreciated  Extremities: warm and well perfused, palpable pulses, no edema  Neuro: Arouses to voice.  Moving all extremities. A&Ox2 per nursing (name, place, not time). Does not respond to majority of writer's questions but is later seen sitting up eating and interacting with significant other   Skin: no rash, ecchymoses appreciated         Data:     CMP    Recent " Labs  Lab 06/01/18  0631 05/31/18  1321   * 145*   POTASSIUM 3.3* 3.7   CHLORIDE 112* 108   CO2 26 27   ANIONGAP 8 10   GLC 82 66*   BUN 6* 6*   CR 0.83 0.98   GFRESTIMATED 77 64   GFRESTBLACK >90 78   JOHANNY 7.0* 8.2*   PROTTOTAL 5.5* 6.8   ALBUMIN 2.3* 3.0*   BILITOTAL 0.2 0.3   ALKPHOS 45 53   AST 26 50*   ALT 27 34     CBC    Recent Labs  Lab 06/01/18  0631 05/31/18  1321   WBC 8.8 15.7*   RBC 3.83 4.11   HGB 10.7* 11.4*   HCT 34.3* 36.4   MCV 90 89   MCH 27.9 27.7   MCHC 31.2* 31.3*   RDW 17.1* 16.5*    341     INR    Recent Labs  Lab 05/31/18  1321   INR 1.16*     Arterial Blood Gas    Recent Labs  Lab 06/01/18  0631   O2PER 21.0     Imaging:     CT Head 5/31/18: Normal     Attending Note:    The patient was seen and examined by me and discussed at length with the resident. I have personally reviewed all labs, vital signs, imaging and culture results from the last 24 hours  I have read and agree with the resident note from today which reflects our joint findings, assessment and plan.    David Perlman, M.D.    Pulmonary, Allergy and Critical Care Medicine  [facility]

## 2018-06-01 NOTE — PROGRESS NOTES
[facility] Transfer Note - [facility]       Main Plans for Today   - Transfer out of ICU  - CD consult    Assessment & Plan   Elaina Lin is a 37 year old female admitted on 5/31/2018. She has a history of bipolar I disorder, anxiety, polysubstance abuse (heroin, cocaine, alcohol), opioid use disorder on methadone and HTN who was admitted for akathisia and altered mental status most likely due to combination of cocaine and methadone. She is clinically improving and will transfer out of the ICU to the [facility].     #Akathisia   Patient presented with uncontrolled body movements and altered mental status. She required ketamine, ativan, and haldol in the ED for severe agitation. Neurology was consulted who felt that the akathisia was most likely related to methadone with the combination with cocaine. Neurology recommended discontinuation of methadone and avoiding dopamine antagonists. CT head was normal. Labs were unremarkable for a metabolic or infectious cause. She is clinically improved with no abnormal body movements and mental status was normal but quite sleepy when I examined her.   - Neurology consulted, appreciate recs  - Methadone was discontinued   - Continue IV benadryl 50mg Q6H prn abnormal body movements   - Continue to hold seroquel 50mg QHS  - Continue to hold depakote 250mg QHS    #Polysubstance Abuse  #Opioid Use Disorder on methadone maintenance   She is currently interested in pursuing chemical dependency treatment and would like to further discuss with social work and CD team about potential treatment. Her fiance is supportive. States last drink of vodka was a few days ago and she usually drinks 1L vodka per day. Positive UDS for cocaine. Follows up with [facility] for Methadone.   - CD consult, appreciate recs  - Social work consult, appreciate recs  - Methadone was discontinued  - Continue MSSA  protocol  - Ibuprofen 200mg Q6H prn pain and Tylenol 650mg Q4H prn pain  - Loperamide prn diarrhea  - Consider suboxone as outpatient possibly    #Bipolar disorder I  #Anxiety  Last seen by Dr. Rodríguez at [facility] 12/2017 and prescribed depakote and seroquel at bedtime. Patient did not follow up with psychiatry. She states she was taking these medications daily.  - CD consult, appreciate recs  - Hold seroquel 50mg QHS for now since unclear PTA meds  - Hold Depakote ER 250mg QHS for now since unclear PTA meds  - Pharmacy med reconciliation consult to see what medications patient was actually taking    #Hypoglycemia  BG 67 today likely from not eating.  - Hypoglycemia protocol    #Hypernatremia- Mild (145)  #Hypokalemia- Mild (3.3)  - Trend daily CMP  - Potassium electrolyte replacement protocol     #Elevated CK  Most likely due to recent cocaine use.   - CK total in AM    #HTN- controlled   She states she was taking amlodipine and labetalol as an outpatient with unsure doses. BP has been controlled in the hospital with only one elevated /105 but has since been normal.  - Continue to monitor BP  - Consider resuming amlodipine if BP continues to be elevated  - Pharmacy med reconciliation consult     #Chronic Anemia- Stable at 10.7  Baseline Hgb 10-11. States she takes ferrous sulfate daily as an outpatient.  - Resume ferrous sulfate at discharge     # Pain Assessment:  Current Pain Score 6/1/2018   Patient currently in pain? denies   Some encounter information is confidential and restricted. Go to Review Flowsheets activity to see all data.   - Elaina is experiencing pain due to Opioid withdrawal. Pain management was discussed and the plan was created in a collaborative fashion.  Elaina's response to the current recommendations: mixed response  - Please see the plan for pain management as documented above    Diet: Regular Diet Adult  Fluids: PO  DVT Prophylaxis: Encourage ambulation every  shift  Code Status: Full Code    Disposition Plan   Expected discharge: 2 - 3 days, recommended to possible CD treatment once mental status at baseline and safe disposition plan/ TCU bed available. Dispo: 6/4/18          Entered: Alejandra Marcelino 06/01/2018, 3:15 PM   Information in the above section will display in the discharge planner report.      The patient's care was discussed with the Attending Physician, Dr. Garcia.    Alejandra Marcelino  [facility]  Pager: [phone]  Please see sticky note for cross cover information    Interval History   Patient was seen and examined. She complains of diffuse joint aches and muscle aches. She feels like her legs are slightly swollen. She reports being tired. Asking when she can go home but states she is interested in CD treatment. Tolerating a regular diet. Denies headache, dizziness, lightheadedness, tremor, diaphoresis, chills, visual disturbance, sore throat, ear pain, difficulty swallowing, cough, SOB, chest pain, palpitations, fever, abdominal pain, nausea, vomiting, diarrhea, constipation, dysuria or difficulty urinating.     Physical Exam   Vital Signs: Temp: 96.6  F (35.9  C) Temp src: Axillary BP: 115/80 Pulse: 89 Heart Rate: 80 Resp: 17 SpO2: 97 % O2 Device: None (Room air)    Weight: 144 lbs 2.89 oz  General Appearance: Alert but sleepy, no acute distress  Respiratory: No increased work of breathing, CTA B/L  Cardiovascular: RRR +S1/S2, no murmurs or rubs  GI: +BS, soft, non-tender, non-distended, no guarding or rebound  Skin: Warm and dry, no rashes on exposed skin  Neuro: CN II-XII grossly intact, no tremor observed with outstretched hands, moving all extremities without any focal deficit   Psych: Flat affect, normal speech, irritable at times, thought content appears normal    Data   Medications       diazepam  5-20 mg Intravenous See Admin Instructions    Or     diazepam  5-20 mg Oral See Admin Instructions     sodium  chloride (PF)  3 mL Intracatheter Q8H     Data     Recent Labs  Lab 06/01/18  0631 05/31/18  1321   WBC 8.8 15.7*   HGB 10.7* 11.4*   MCV 90 89    341   INR  --  1.16*   * 145*   POTASSIUM 3.3* 3.7   CHLORIDE 112* 108   CO2 26 27   BUN 6* 6*   CR 0.83 0.98   ANIONGAP 8 10   JOHANNY 7.0* 8.2*   GLC 82 66*   ALBUMIN 2.3* 3.0*   PROTTOTAL 5.5* 6.8   BILITOTAL 0.2 0.3   ALKPHOS 45 53   ALT 27 34   AST 26 50*     No results found for this or any previous visit (from the past 24 hour(s)).

## 2018-06-01 NOTE — ED NOTES
Blood sugar 172    Pt is on her period. Given a tampon by the NST. Leoncio RN called to transport pt upstairs to

## 2018-06-01 NOTE — CONSULTS
"[facility]  Neurology Consultation    Patient Name:  Elaina Lin  MRN:  [id number]    :  1980  Date of Service:  May 31, 2018  Primary care provider:  No Ref-Primary, Physician      History of Present Illness:   37 year old female h/o anxiety and depression as well as substance use (heroin, alcohol, crack) now on methadone maintenance therapy, who presents with uncontrolled bowel movements and altered mental status. The patient is not alert enough to offer history but her significant other states that the patient has been having uncontrolled body movements and mental status changes that started the day she began methadone thearpy. She has a history of heroin use - she went to assisted and was clean, discharged to treatment. She is now getting methadone through [facility]. He thinks that when she takes her methadone dose she gets \"weird\" thirty minutes later. He describes and demonstrates uncontrolled movements, rocking, swinging her arms. She appears to understand him but responds in one to two word phrases. There is no associated tongue biting, jerking, urinary incontinence. This lasts until the same evening but can last until the next morning. She did not take methadone Sat/Sun/Mon. over Memorial Day and during that time she was her normal self without any abnormal movements. She has panic attacks characterized by crying and hyperventilation that are clearly different form these episodes.     He reports she has used drugs for roughly ten years. She typically snorts heroin, but is in outpatient treatment now. She is using crack, and UDS is positive. She binge drinks as well, starting early in the morning. She does not use marijuana. She has been on Seroquel 25 mg qhs PRN for many years, but she only uses it when she's clean and trying to sleep well. She takes VPA for mood.     She was admitted to [facility]  where she reported having teary eyes, rhinorrhea and itchiness " daily when she takes the methadone. She then had respiratory depression and was started on Narcan gtt for opiate overdose.     She visited  where she was crying/yelling and nonconversive but left the hospital before being seen. She returned  where her movements where felt to be akithisia - she was treated with benadryl IV and benztropine PO, sent home on benztropine 1 mg BID PO.     ROS  A 10-point ROS was performed as per HPI with the significant other but not the patient due to mental status.     PMH  Past Medical History:   Diagnosis Date     Genital herpes 2012     IMO update changed this record. Please review for accuracy     H/O: substance abuse 2013     Postpartum depression 10/21/2011     Recur major depress, severe 2012     Substance abuse      Past Surgical History:   Procedure Laterality Date      SECTION  2011 and 13     HERNIA REPAIR       REPAIR TENDON HAND         Allergies  Allergies   Allergen Reactions     Tomato Anaphylaxis     Compazine [Prochlorperazine]        Social History  See HPI - polysubstance use, currently in outpatient chem dep, has significant other.  Normal birth and development, holds two college level degrees.    Family History    No h/o seizures.    Physical Examination   Vitals: /77  Pulse 89  Temp 96.3  F (35.7  C) (Axillary)  Resp 23  Wt 71.2 kg (157 lb)  SpO2 97%  BMI 28.72 kg/m2  General: Adult, snoring, sleeping in bed  HEENT: NC/AT, no icterus, op pink and moist  Cardiac: RRR no M  Chest: CTAB no w/c/r  Abdomen: S/NT/ND  Extremities: No LE swelling.  Skin: No rash or lesion.   Psych: Restless, agitated, nonverbal  Neuro:  Mental status: Sleepy, difficult to arouse by voice alone, rouses to light sternal rub. Does not answer questions, states her name. She spoke one brief sentence confounded moderate dysarthria.  Cranial nerves: Eyes conjugate, PERRLA 1mm, EOMI, face symmetric, tongue/uvula midline.   Motor: Tone within  normal. Restless when stimulated, moving all 4 extremities, pushing examiner away. Symmetrically anti-resistance in all 4 extremities. No atrophy or twitches.   Reflexes: Normoreflexic and symmetric biceps, patellar, and achilles. No ankle clonus. Toes down-going.  Sensory: Intact to LT, pain.  Coordination: Cannot assess due to mental status.  Gait: Cannot assess due to mental status.    Investigations   [facility] visit 5/25 labs  BMP relatively normal  ESR 15  WBC 14.5    Labs today  Sodium 145, WBC 15.7 with neutrophilic predominance  Urinalysis noninfectious  UDS positive for cocaine  Ethanol negative    Noncontrast head CT normal    Impression and Recommendations  Elaina Lin is a 37-year-old woman with polysubstance abuse currently on methadone who has had several weeks of abnormal movements and altered mental status. By history, her abnormal movements began when she began methadone therapy. Her movements are most likely akathisia based on history and what I could observe this evening, which is apparently a mild form of her symptoms. She does not appear to be dystonic. I suspect that this akathisia is  secondary to the methadone in combination with her cocaine use. Of note, she uses quetiapine as needed at night, which may also exacerbate the symptoms. There was a question of whether or not this could be seizure, however, the natural history of these episodes is inconsistent with seizure.    At this time, there is no further neurologic workup needed. I would recommend that she discontinue the methadone, and of course the cocaine, in a safe environment for opiate withdrawal, so that we could assess for the presence of akathisia when she is off the substances. In the meantime, if abnormal movements need to be treated, would recommend using Benadryl 50 mg IV rather than dopamine antagonists, which could exacerbate her symptoms. I would also hold her nightly quetiapine. If she is admitted, we  will again assess her in the morning.    Thank you for involving neurology in the care of Elaina Lin.  Please do not hesitate to call with questions/concerns (consult pager [phone]).      This note was dictated with Alex, please excuse any errors in dictation.     Patient was seen and discussed with Dr. Leal.    Khushboo Dyer DO  Neurology PGY2  P: [phone]    ATTENDING 6/1/18: Discussed with Dr Dyer last pm. Patient seen and examined this am. Her symptoms have resolved. Agree with her Impression and Recommendations   . Hyperkinetic movement disorder/akathesia would be a rare side effect from Methadone. Some reports however. Often when combined with another drug/agent (eg cocaine). Interestingly she had oculogyric crisis with compazine in past. Agree with not resuming methadone. Lowell Leal MD

## 2018-06-01 NOTE — PLAN OF CARE
Problem: Patient Care Overview  Goal: Plan of Care/Patient Progress Review  Outcome: Improving  Patient lethargic this am, opens eyes to voice or touch. Since 1300 more alert, answering questions,   Goal: Individualization & Mutuality  Outcome: Improving  Patient lethargic this am, arousable to voice and touch. Alert and oriented most of the time but not consistently. No akasthisia noted.

## 2018-06-01 NOTE — PHARMACY-ADMISSION MEDICATION HISTORY
Admission medication history interview status for the 5/31/2018 admission is complete. See Epic admission navigator for allergy information, pharmacy, prior to admission medications and immunization status.     Medication history interview sources:  [facility] [phone], patient and Care Everywhere.    Changes made to PTA medication list (reason)  Added: Methadone (maintenance), amlodipine, labetalol and ferrous sulfate.  Deleted: Buspirone (not taking) and nifedipine (uses amlodipine).  Changed: Updated divalproex sodium from 250 mg AM and 500 mg PM -> 1250 mg each evening.  Confirmed with patient and [facility] Care Everywhere.    Additional medication history information (including reliability of information, actions taken by pharmacist):  - Patient was drowsy and needed to be repeatedly woken, but was an excellent historian once awake.  She was able to confirm medication names, doses and administration times unprompted.  - Patient advised she is restricted to [facility] pharmacy.  - Patient's methadone clinic is [facility] [phone].  She goes by Elaina Lance.  Per Viv the patient was last dosed 5/31/2018 with 40 mg of methadone with no take home doses.  - Patient did not receive the influenza vaccination this season.  - Reviewed allergies with patient.      Prior to Admission medications    Medication Sig Last Dose Taking? Auth Provider   AMLODIPINE BESYLATE PO Take 10 mg by mouth Past Week at AM Yes Unknown, Entered By History   Divalproex Sodium (DEPAKOTE PO) Take 1,250 mg by mouth every evening 250 mg in the AM and 500 mg at night Past Week at PM Yes Reported, Patient   ferrous sulfate (IRON) 325 (65 Fe) MG tablet Take 325 mg by mouth daily (with breakfast) Past Week at AM Yes Unknown, Entered By History   LABETALOL HCL PO Take 100 mg by mouth 2 times daily Past Week at AM/PM Yes Unknown, Entered By History   methadone (DOLPHINE) 5 MG/5ML solution Take 40 mg  by mouth daily 5/31/2018 at AM Yes Unknown, Entered By History   QUEtiapine (SEROQUEL) 100 MG tablet Take 100 mg by mouth At Bedtime  Past Week at PM Yes Reported, Patient   QUEtiapine (SEROQUEL) 25 MG tablet Take 25 mg by mouth daily Past Week at AM Yes Unknown, Entered By History   EPINEPHrine (EPIPEN/ADRENACLICK/OR ANY BX GENERIC EQUIV) 0.3 MG/0.3ML injection 2-pack Inject 0.3 mLs (0.3 mg) into the muscle once as needed for anaphylaxis  at [facility] MD   naloxone (NARCAN) 1 mg/mL for intranasal kit (2 syringes with 2 mucosal atomizer device) In opioid overdose put cone in nostril and push 1/2 of contents into each nostril.  Repeat every 3 min if no response until help arrives.  at [facility] MD         Medication history completed by: Rivera Chin PD4 Student

## 2018-06-01 NOTE — PLAN OF CARE
Problem: Patient Care Overview  Goal: Plan of Care/Patient Progress Review  Outcome: No Change  Pt was a transfer from . Pt is A&O x 4, VSS, withdrawn, and on room air. MSSA protocol in place, pt not scoring. K+ 3.3, replaced w/ 1 time dose. Pt consulted by Psych, neuro and chem dep. Pt up w/ SBA in room/hallway. Tolerating regular diet well. Denies any pain/nausea. No signs of akathisia. L PIV SL. Continue to monitor and POC.

## 2018-06-01 NOTE — CONSULTS
"Consult Date:  06/01/2018      IDENTIFICATION:  Ms. Elaina Lni is a 37-year-old, American female who is currently hospitalized with altered mental status and abnormal movements.  I am asked to evaluate her chemical dependency by Dr. Curtis.      HISTORY OF PRESENT ILLNESS:  Prior to interviewing this patient, I had an opportunity to review the very thorough neurologic consultation, which was completed by Dr. Leal on 05/31/2018.  At that time, the abnormal movements were thought to be akathisia, likely secondary to a combination of methadone and cocaine.  The patient also does heroin and alcohol.  That note suggests that the patient was also admitted to [facility] on 05/16/2018 with teary eyes, rhinorrhea and itchiness.  This sounds consistent with opiate withdrawal.  She then had respiratory depression and was started on Narcan for opiate overdose.  So, that is somewhat of an unusual picture; however, on 05/23/2018, she again presented this time to the [facility] Emergency Room where she was \"crying and yelling in elevators on plaza level, unable to determine what is wrong, as she would not converse with staff.\"  She eventually walked away with a male who was with her.  She left the hospital before being assessed.      I have attempted to interview this patient on various occasions.  On my second attempt, I was able to wake her up very briefly.  During that time when awake, she did have some akathisic movements of her legs.  I agree with Neurology; this definitely did appear to be akathisia.  Later, I had an opportunity actually interview the patient; this was this afternoon, and the patient no longer had any movements whatsoever.  She told me that she really did not want to talk to me very much.  She particularly refused to talk about her chemical dependency treatment.  She says she is currently in a CD program but refuses to tell me what it is.  She goes on to say that she has an active Rule 25 and wants to go " from here to a different program.  She easily admits to using heroin, cocaine, methadone and alcohol.      The patient apparently carries a diagnosis of bipolar for which she is treated with valproate.  The chart suggests 250 in the morning and 500 at night.  She also takes Seroquel 100 mg at bedtime.      Ms. Lin was actually quite irritable on my most recent visit.  She really did not want to talk to me; however, she was willing to participate briefly in a family history and review of systems.  Most of my information has come from the chart.      PAST MEDICAL HISTORY:  According to our records, she has a history of postpartum depression, recurrent major depression, substance abuse and genital herpes.  Other places suggest she has been diagnosed with bipolar, and apparently, she has a history of panic attacks.      PAST SURGICAL HISTORY:  Includes a , hernia repair and tendon repair.      ALLERGIES:  TOMATO AND COMPAZINE.  THE COMPAZINE TURNED OUT TO BE A DYSTONIC REACTION.        FAMILY HISTORY:  The patient tells me that her mother is schizophrenic.  She denies any family history of substance use.      SOCIAL HISTORY:  The patient has been seen multiple times recently in emergency rooms here at [facility].  She has been with a male partner.  She tells me that she is in a chemical dependency program but will not tell me which one.  She gets her methadone from [facility].  She smokes a pack of cigarettes per day, and the chart reports that she drinks a liter of vodka per day.      REVIEW OF SYSTEMS:  On my interview, the patient denied headache or problems with vision or hearing.  She denied chest pain or shortness of breath, abdominal pain, diarrhea, constipation, genitourinary symptoms or problems with muscles, skin or joints.  She denies endocrinopathies.      MENTAL STATUS EXAMINATION:  Initially, the patient was impossible to wake up.  Later on in the afternoon, she was able to wake up  but was somewhat irritable.  Her mood was angry, as was her affect.  Her speech was coherent and goal oriented.  Her associations were tight.  Her thought processes were logical and linear.  Her content of thought was without overt psychosis or suicidal ideation.  Recent and remote memory, concentration, fund of knowledge and use of language appear to be at baseline.  She is alert and oriented x 3.  Insight and judgment are guarded.  Muscle strength and tone are at baseline.        Recent vitals include a temperature of 96.6, pulse of 89, respiration rate of 17 with 97% oxygen saturation and a blood pressure of 115/80.      ASSESSMENT:  Alcohol use disorder, heroin use disorder, cocaine use disorder, methadone use disorder, history of mood disorder not otherwise specified.  At points, recurrent depression.  At other points, she is diagnosed with bipolar.      RECOMMENDATIONS:  Please start home meds including Depakote and Seroquel.  Please consult Social Work to try to determine what chemical dependency programs might be available to this patient.         FLORES HALL MD             D: 2018   T: 2018   MT: DADA      Name:     RENATE MENEZES   MRN:      [id number]        Account:       [id number]   :      1980           Consult Date:  2018      Document: [id number]

## 2018-06-02 ENCOUNTER — APPOINTMENT (OUTPATIENT)
Dept: ULTRASOUND IMAGING | Facility: CLINIC | Age: 38
End: 2018-06-02
Payer: MEDICAID

## 2018-06-02 LAB
ALBUMIN SERPL-MCNC: 2.2 G/DL (ref 3.4–5)
ALP SERPL-CCNC: 46 U/L (ref 40–150)
ALT SERPL W P-5'-P-CCNC: 23 U/L (ref 0–50)
ANION GAP SERPL CALCULATED.3IONS-SCNC: 9 MMOL/L (ref 3–14)
AST SERPL W P-5'-P-CCNC: 26 U/L (ref 0–45)
BASOPHILS # BLD AUTO: 0 10E9/L (ref 0–0.2)
BASOPHILS NFR BLD AUTO: 0.3 %
BILIRUB SERPL-MCNC: 0.2 MG/DL (ref 0.2–1.3)
BUN SERPL-MCNC: 5 MG/DL (ref 7–30)
CALCIUM SERPL-MCNC: 7.6 MG/DL (ref 8.5–10.1)
CHLORIDE SERPL-SCNC: 109 MMOL/L (ref 94–109)
CK SERPL-CCNC: 224 U/L (ref 30–225)
CO2 SERPL-SCNC: 23 MMOL/L (ref 20–32)
CREAT SERPL-MCNC: 0.67 MG/DL (ref 0.52–1.04)
DIFFERENTIAL METHOD BLD: ABNORMAL
EOSINOPHIL # BLD AUTO: 0.2 10E9/L (ref 0–0.7)
EOSINOPHIL NFR BLD AUTO: 2.1 %
ERYTHROCYTE [DISTWIDTH] IN BLOOD BY AUTOMATED COUNT: 17 % (ref 10–15)
GFR SERPL CREATININE-BSD FRML MDRD: >90 ML/MIN/1.7M2
GLUCOSE SERPL-MCNC: 127 MG/DL (ref 70–99)
HCT VFR BLD AUTO: 33.8 % (ref 35–47)
HGB BLD-MCNC: 10.4 G/DL (ref 11.7–15.7)
IMM GRANULOCYTES # BLD: 0 10E9/L (ref 0–0.4)
IMM GRANULOCYTES NFR BLD: 0.2 %
LYMPHOCYTES # BLD AUTO: 2.8 10E9/L (ref 0.8–5.3)
LYMPHOCYTES NFR BLD AUTO: 30.1 %
MAGNESIUM SERPL-MCNC: 2 MG/DL (ref 1.6–2.3)
MCH RBC QN AUTO: 27.8 PG (ref 26.5–33)
MCHC RBC AUTO-ENTMCNC: 30.8 G/DL (ref 31.5–36.5)
MCV RBC AUTO: 90 FL (ref 78–100)
MONOCYTES # BLD AUTO: 0.5 10E9/L (ref 0–1.3)
MONOCYTES NFR BLD AUTO: 5.4 %
NEUTROPHILS # BLD AUTO: 5.7 10E9/L (ref 1.6–8.3)
NEUTROPHILS NFR BLD AUTO: 61.9 %
NRBC # BLD AUTO: 0 10*3/UL
NRBC BLD AUTO-RTO: 0 /100
PLATELET # BLD AUTO: 298 10E9/L (ref 150–450)
PLATELET # BLD EST: ABNORMAL 10*3/UL
POTASSIUM SERPL-SCNC: 3.8 MMOL/L (ref 3.4–5.3)
PROT SERPL-MCNC: 5.4 G/DL (ref 6.8–8.8)
RBC # BLD AUTO: 3.74 10E12/L (ref 3.8–5.2)
SODIUM SERPL-SCNC: 141 MMOL/L (ref 133–144)
WBC # BLD AUTO: 9.1 10E9/L (ref 4–11)

## 2018-06-02 PROCEDURE — 25000132 ZZH RX MED GY IP 250 OP 250 PS 637: Performed by: INTERNAL MEDICINE

## 2018-06-02 PROCEDURE — 83735 ASSAY OF MAGNESIUM: CPT | Performed by: FAMILY MEDICINE

## 2018-06-02 PROCEDURE — 12000001 ZZH R&B MED SURG/OB UMMC

## 2018-06-02 PROCEDURE — 85025 COMPLETE CBC W/AUTO DIFF WBC: CPT | Performed by: FAMILY MEDICINE

## 2018-06-02 PROCEDURE — 82550 ASSAY OF CK (CPK): CPT | Performed by: FAMILY MEDICINE

## 2018-06-02 PROCEDURE — 80053 COMPREHEN METABOLIC PANEL: CPT | Performed by: FAMILY MEDICINE

## 2018-06-02 PROCEDURE — 36415 COLL VENOUS BLD VENIPUNCTURE: CPT | Performed by: FAMILY MEDICINE

## 2018-06-02 PROCEDURE — 25000128 H RX IP 250 OP 636: Performed by: FAMILY MEDICINE

## 2018-06-02 PROCEDURE — 25000132 ZZH RX MED GY IP 250 OP 250 PS 637: Performed by: FAMILY MEDICINE

## 2018-06-02 PROCEDURE — 93971 EXTREMITY STUDY: CPT | Mod: LT

## 2018-06-02 PROCEDURE — 25000125 ZZHC RX 250: Performed by: STUDENT IN AN ORGANIZED HEALTH CARE EDUCATION/TRAINING PROGRAM

## 2018-06-02 RX ORDER — METHADONE HYDROCHLORIDE 10 MG/ML
40 CONCENTRATE ORAL DAILY
Status: DISCONTINUED | OUTPATIENT
Start: 2018-06-02 | End: 2018-06-02

## 2018-06-02 RX ORDER — AMLODIPINE BESYLATE 10 MG/1
10 TABLET ORAL DAILY
Status: DISCONTINUED | OUTPATIENT
Start: 2018-06-02 | End: 2018-06-05

## 2018-06-02 RX ORDER — HYDRALAZINE HYDROCHLORIDE 20 MG/ML
10 INJECTION INTRAMUSCULAR; INTRAVENOUS ONCE
Status: COMPLETED | OUTPATIENT
Start: 2018-06-02 | End: 2018-06-02

## 2018-06-02 RX ORDER — METHADONE HYDROCHLORIDE 5 MG/5ML
40 SOLUTION ORAL DAILY
Status: DISCONTINUED | OUTPATIENT
Start: 2018-06-02 | End: 2018-06-05 | Stop reason: HOSPADM

## 2018-06-02 RX ORDER — ALBUTEROL SULFATE 90 UG/1
2 AEROSOL, METERED RESPIRATORY (INHALATION) 4 TIMES DAILY
Status: DISCONTINUED | OUTPATIENT
Start: 2018-06-02 | End: 2018-06-05 | Stop reason: HOSPADM

## 2018-06-02 RX ORDER — LABETALOL 100 MG/1
100 TABLET, FILM COATED ORAL EVERY 12 HOURS SCHEDULED
Status: DISCONTINUED | OUTPATIENT
Start: 2018-06-02 | End: 2018-06-04

## 2018-06-02 RX ORDER — ONDANSETRON 4 MG/1
4 TABLET, ORALLY DISINTEGRATING ORAL EVERY 6 HOURS PRN
Status: DISCONTINUED | OUTPATIENT
Start: 2018-06-02 | End: 2018-06-05 | Stop reason: HOSPADM

## 2018-06-02 RX ORDER — ALBUTEROL SULFATE 90 UG/1
2 AEROSOL, METERED RESPIRATORY (INHALATION) 4 TIMES DAILY PRN
Status: DISCONTINUED | OUTPATIENT
Start: 2018-06-02 | End: 2018-06-05 | Stop reason: HOSPADM

## 2018-06-02 RX ADMIN — ALBUTEROL SULFATE 2 PUFF: 90 AEROSOL, METERED RESPIRATORY (INHALATION) at 19:43

## 2018-06-02 RX ADMIN — LABETALOL HCL 100 MG: 100 TABLET, FILM COATED ORAL at 19:43

## 2018-06-02 RX ADMIN — HYDRALAZINE HYDROCHLORIDE 10 MG: 20 INJECTION INTRAMUSCULAR; INTRAVENOUS at 18:03

## 2018-06-02 RX ADMIN — IBUPROFEN 200 MG: 200 TABLET, FILM COATED ORAL at 02:45

## 2018-06-02 RX ADMIN — AMLODIPINE BESYLATE 10 MG: 10 TABLET ORAL at 16:46

## 2018-06-02 RX ADMIN — METHADONE HYDROCHLORIDE 40 MG: 5 SOLUTION ORAL at 17:54

## 2018-06-02 RX ADMIN — IBUPROFEN 200 MG: 200 TABLET, FILM COATED ORAL at 19:43

## 2018-06-02 RX ADMIN — ONDANSETRON 4 MG: 4 TABLET, ORALLY DISINTEGRATING ORAL at 20:30

## 2018-06-02 ASSESSMENT — PAIN DESCRIPTION - DESCRIPTORS: DESCRIPTORS: ACHING

## 2018-06-02 NOTE — PLAN OF CARE
Problem: Patient Care Overview  Goal: Plan of Care/Patient Progress Review  Outcome: No Change  Pt. is alert and oriented x 4 ,at times uncooperative with assessments and checking vitals signs.pt.c/o bilateral lower leg pain and swelling also c/o left upper arm tenderness,mild left arm and legs swelling noted.pt is up with SBA usually  with 's help.LS clear,BS+,reported adequate urinary output  and no BM.BP high 146/100 and 139/97 ,pt stated it is because her ex boy friend keeps calling her on the phone and bothering her.On MSSA scale pt .scored 3.PLAN: to have BLE US to rule out DVT.SW to arrange chemical dependent treatment when medically stable.Will continue to monitor for signs ans symptoms of methadone and alcohol withdraw.

## 2018-06-02 NOTE — PLAN OF CARE
Problem: Patient Care Overview  Goal: Individualization & Mutuality  Outcome: No Change  Patient is A & O x 3, sleeps in between cares.  Patient is AVSS and lung sounds diminished on the base. No c/o pain or n/v during this shift. PIV is patent, saline locked. Patient is up independently. Continue with plan of care and notify MD for status changes.

## 2018-06-02 NOTE — PROGRESS NOTES
[facility] Progress Note - [place]       Main Plans for Today   - CD consult (will discuss with social work)  - Restart Methadone 40 mg orally daily  - Monitor for alcohol withdrawal  - Ultrasound of left leg    Assessment & Plan   Elaina Lin is a 37 year old female admitted on 5/31/2018. She has a history of bipolar I disorder, anxiety, polysubstance abuse (heroin, cocaine, alcohol), opioid use disorder on methadone and HTN who was admitted for akathisia and altered mental status most likely due to combination of cocaine and methadone. She was admitted to ICU and transferred to our service 6/1.    #Akathisia: improved  #Depressed consciousness: improved  Patient presented with uncontrolled body movements and altered mental status. She required ketamine, ativan, and haldol in the ED for severe agitation. Neurology was consulted who felt that the akathisia was most likely related to methadone with the combination with cocaine. Neurology recommended discontinuation of methadone (at time of admission) and avoiding dopamine antagonists. CT head was normal. Labs were unremarkable for a metabolic or infectious cause. She no longer has akathisia and mental status has improved.  - Neurology consulted, appreciate recs  - Methadone was held     #Polysubstance Abuse  #Opioid Use Disorder on methadone maintenance   She is currently interested in pursuing chemical dependency treatment and would like to further discuss with social work and CD team about potential treatment. Her fiance is supportive. States last drink of vodka was 2 days prior and she usually drinks 1L vodka per day. Positive UDS for cocaine. Follows up with [facility] for Methadone.   - Social work consult for Chem Dep, appreciate recs  - Restart Methadone today  - Continue MSSA protocol  - Ibuprofen 200mg Q6H prn pain and Tylenol 650mg Q4H prn pain  - Loperamide prn diarrhea  - Consider suboxone  as outpatient possibly    #Leg pain  #Bilateral foot pain with edema  Would consider fluids on admission contributing to leg pain and foot edema.  Also consider DVT and cellulitis in differential.  Left calf significantly more tender than right calf  - Continue to monitor  - Encourage ambulation  - Left calf ultrasound    #Bipolar disorder I  #Anxiety  Last seen by Dr. Rodríguez at [facility] 12/2017 and prescribed depakote at bedtime. Patient did not follow up with psychiatry, states she has not taken Depakote recently and takes Seroquel as needed (consistent with negative Depakote level on admission).  Per pharm reconciliation, patient has been prescribed Depakote 1250 mg at bedtime.   - Med reconciliation per pharm, appreciate recs  - Plan to restart Depakote 1250 tomorrow, 6/3 (after restarting Methadone since patient admitted for depressed consciousness)     #Hypoglycemia: resolved  - Hypoglycemia protocol    #Hypernatremia: Improved  #Hypokalemia: Improved    #Elevated CK on admission: resolved  Most likely due to recent cocaine use.     #HTN- controlled   Per pharm, patient was taking amlodipine and labetalol.  BP low on admission and BP medications held, will restart today  - Continue to monitor BP  - Pharm consulted, appreciate recs  - Restart amlodipine 10 mg daily   - Restart labetalol 100 mg orally twice a day    #Chronic Anemia- Stable at 10.7  Baseline Hgb 10-11. States she takes ferrous sulfate daily as an outpatient.  - Resume ferrous sulfate     # Pain Assessment:  Current Pain Score 6/2/2018   Patient currently in pain? yes   Pain location Foot   Pain descriptors Aching   Some encounter information is confidential and restricted. Go to Review Flowsheets activity to see all data.   - Elaina is experiencing leg and foot pain, currently working up etiology. She is receiving Tylenol and Ibuprofen for pain.  Pain management was discussed and the plan was created in a collaborative fashion.   Elaina's response to the current recommendations: mixed response  - Please see the plan for pain management as documented above    Diet: Regular Diet Adult  Fluids: PO  DVT Prophylaxis: Encourage ambulation every shift  Code Status: Full Code    Disposition Plan   Expected discharge: 2 - 3 days, recommended to possible CD treatment once mental status at baseline and safe disposition plan/ TCU bed available. Dispo: 6/4/18          Entered: Tanvi Russell 06/02/2018, 9:21 AM   Information in the above section will display in the discharge planner report.      The patient's care was discussed with the Attending Physician, Dr. Garcia.    Tanvi Russell  [facility]  Pager: [phone]  Please see sticky note for cross cover information    Interval History   Patient was seen and examined. She complains of diffuse joint aches and muscle aches. She complains of pain in both calves, left and feels like her legs are slightly swollen. Is interested in CD treatment for cocaine, alcohol, and opiod use. Tolerating a regular diet.  Patient ambulating (with difficulty secondary to leg/foot pain and swelling), goes outside to smoke. Denies headache, tremor, cough, chest pain, abdominal pain, nausea, vomiting, diarrhea or constipation, dysuria or difficulty urinating.    Physical Exam   Vital Signs: Temp: 98.3  F (36.8  C) Temp src: Oral BP: 122/84 Pulse: 72 Heart Rate: 80 Resp: 10 SpO2: 100 % O2 Device: None (Room air)    Weight: 144 lbs 2.89 oz  General Appearance: Alert but sleepy, no acute distress  Respiratory: No increased work of breathing, CTA B/L  Cardiovascular: RRR +S1/S2, no murmurs or rubs  GI: +BS, soft, non-tender, non-distended, no guarding or rebound  Skin: Warm and dry, no rashes on exposed skin  Ext: Moderate-severe tenderness to palpation of left calf, no significant tenderness to right calf.  Both feet puffy, no significant edema  Neuro: CN II-XII grossly  intact, no tremor observed with outstretched hands, moving all extremities without any focal deficit   Psych: Normal speech, irritable at times, thought content appears normal    Data   Medications       diazepam  5-20 mg Intravenous See Admin Instructions    Or     diazepam  5-20 mg Oral See Admin Instructions     sodium chloride (PF)  3 mL Intracatheter Q8H     Data     Recent Labs  Lab 06/02/18  0640 06/01/18  0631 05/31/18  1321   WBC 9.1 8.8 15.7*   HGB 10.4* 10.7* 11.4*   MCV 90 90 89    284 341   INR  --   --  1.16*    145* 145*   POTASSIUM 3.8 3.3* 3.7   CHLORIDE 109 112* 108   CO2 23 26 27   BUN 5* 6* 6*   CR 0.67 0.83 0.98   ANIONGAP 9 8 10   JOHANNY 7.6* 7.0* 8.2*   * 82 66*   ALBUMIN 2.2* 2.3* 3.0*   PROTTOTAL 5.4* 5.5* 6.8   BILITOTAL 0.2 0.2 0.3   ALKPHOS 46 45 53   ALT 23 27 34   AST 26 26 50*     No results found for this or any previous visit (from the past 24 hour(s)).

## 2018-06-02 NOTE — PHARMACY-CONSULT NOTE
Methadone Clinic Information Note  (Info obtained from Pharmacist med history note from 6/1/18)    Clinic Name: [facility] [place]  Clinic Location (city): [place]  Phone Number: [phone]  Last dose: 5/31/18 (no take home doses given)

## 2018-06-02 NOTE — PLAN OF CARE
Problem: Patient Care Overview  Goal: Plan of Care/Patient Progress Review  Outcome: No Change  Pt A&O. VSS on RA. Up with SBA, calls appropriately. MSSA 3, 5. C/o pain in BLLE from shin down. Ibuprofen given with no marked changed in pain. Pt reports her feet feel stiff and swollen, no pitting edema. Legs elevated. , Bart Lance, at the bedside (unclear if he is current or ex ). Otherwise pt wants no info given to anyone. Plan for chem dep consult.

## 2018-06-03 PROCEDURE — 25000132 ZZH RX MED GY IP 250 OP 250 PS 637: Performed by: FAMILY MEDICINE

## 2018-06-03 PROCEDURE — 12000001 ZZH R&B MED SURG/OB UMMC

## 2018-06-03 PROCEDURE — 25000132 ZZH RX MED GY IP 250 OP 250 PS 637: Performed by: INTERNAL MEDICINE

## 2018-06-03 PROCEDURE — 25000132 ZZH RX MED GY IP 250 OP 250 PS 637: Performed by: STUDENT IN AN ORGANIZED HEALTH CARE EDUCATION/TRAINING PROGRAM

## 2018-06-03 PROCEDURE — 25000125 ZZHC RX 250: Performed by: STUDENT IN AN ORGANIZED HEALTH CARE EDUCATION/TRAINING PROGRAM

## 2018-06-03 RX ORDER — QUETIAPINE FUMARATE 25 MG/1
25 TABLET, FILM COATED ORAL DAILY
Status: DISCONTINUED | OUTPATIENT
Start: 2018-06-03 | End: 2018-06-03

## 2018-06-03 RX ORDER — IODINE/SODIUM IODIDE 2 %
TINCTURE TOPICAL
Status: DISCONTINUED | OUTPATIENT
Start: 2018-06-03 | End: 2018-06-05 | Stop reason: HOSPADM

## 2018-06-03 RX ORDER — DIPHENHYDRAMINE HCL 25 MG
25-50 CAPSULE ORAL EVERY 6 HOURS PRN
Status: DISCONTINUED | OUTPATIENT
Start: 2018-06-03 | End: 2018-06-05 | Stop reason: HOSPADM

## 2018-06-03 RX ORDER — IBUPROFEN 200 MG
400 TABLET ORAL EVERY 6 HOURS PRN
Status: DISCONTINUED | OUTPATIENT
Start: 2018-06-03 | End: 2018-06-05 | Stop reason: HOSPADM

## 2018-06-03 RX ORDER — QUETIAPINE FUMARATE 25 MG/1
25 TABLET, FILM COATED ORAL AT BEDTIME
Status: DISCONTINUED | OUTPATIENT
Start: 2018-06-03 | End: 2018-06-05 | Stop reason: HOSPADM

## 2018-06-03 RX ORDER — POLYETHYLENE GLYCOL 3350 17 G/17G
17 POWDER, FOR SOLUTION ORAL DAILY
Status: DISCONTINUED | OUTPATIENT
Start: 2018-06-03 | End: 2018-06-05 | Stop reason: HOSPADM

## 2018-06-03 RX ADMIN — POLYETHYLENE GLYCOL 3350 17 G: 17 POWDER, FOR SOLUTION ORAL at 11:30

## 2018-06-03 RX ADMIN — IBUPROFEN 400 MG: 200 TABLET, FILM COATED ORAL at 17:06

## 2018-06-03 RX ADMIN — IBUPROFEN 200 MG: 200 TABLET, FILM COATED ORAL at 04:13

## 2018-06-03 RX ADMIN — DIPHENHYDRAMINE HYDROCHLORIDE 50 MG: 25 CAPSULE ORAL at 04:13

## 2018-06-03 RX ADMIN — LABETALOL HCL 100 MG: 100 TABLET, FILM COATED ORAL at 09:03

## 2018-06-03 RX ADMIN — METHADONE HYDROCHLORIDE 40 MG: 5 SOLUTION ORAL at 11:30

## 2018-06-03 RX ADMIN — DIVALPROEX SODIUM 1250 MG: 250 TABLET, FILM COATED, EXTENDED RELEASE ORAL at 22:55

## 2018-06-03 RX ADMIN — ONDANSETRON 4 MG: 4 TABLET, ORALLY DISINTEGRATING ORAL at 11:31

## 2018-06-03 RX ADMIN — DIPHENHYDRAMINE HYDROCHLORIDE 50 MG: 25 CAPSULE ORAL at 17:04

## 2018-06-03 RX ADMIN — ALBUTEROL SULFATE 2 PUFF: 90 AEROSOL, METERED RESPIRATORY (INHALATION) at 19:59

## 2018-06-03 RX ADMIN — ACETAMINOPHEN 650 MG: 325 TABLET, FILM COATED ORAL at 17:06

## 2018-06-03 RX ADMIN — AMLODIPINE BESYLATE 10 MG: 10 TABLET ORAL at 09:03

## 2018-06-03 RX ADMIN — ACETAMINOPHEN 650 MG: 325 TABLET, FILM COATED ORAL at 04:15

## 2018-06-03 RX ADMIN — FERRIC OXIDE RED: 8; 8 LOTION TOPICAL at 22:56

## 2018-06-03 RX ADMIN — ALBUTEROL SULFATE 2 PUFF: 90 AEROSOL, METERED RESPIRATORY (INHALATION) at 09:04

## 2018-06-03 ASSESSMENT — PAIN DESCRIPTION - DESCRIPTORS: DESCRIPTORS: HEADACHE

## 2018-06-03 NOTE — PROGRESS NOTES
[facility], [facility] Progress Note - [facility]       Main Plans for Today   - Social work consult  - Restart seroquel 25mg at bedtime and depakote 1250mg at bedtime  - Monitor for alcohol withdrawal    Assessment & Plan   Elaina Lin is a 37 year old female admitted on 5/31/2018. She has a history of bipolar I disorder, anxiety, polysubstance abuse (heroin, cocaine, alcohol), opioid use disorder on methadone and HTN who was admitted for akathisia and altered mental status most likely due to combination of cocaine and methadone. She was admitted to ICU and transferred to our service 6/1. Clinically improved awaiting safe discharge.     #Akathisia: improved  #Depressed consciousness: improved  Patient presented with uncontrolled body movements and altered mental status. Neurology was consulted who felt that the akathisia was most likely related to methadone with the combination with cocaine. She no longer has akathisia and mental status has improved.   - Neurology consulted, appreciate recs  - Social work consult for CD treatment potentially     #Polysubstance Abuse  #Opioid Use Disorder on methadone maintenance   She is currently interested in pursuing chemical dependency treatment and would like to further discuss with social work and CD team about potential treatment. Her fiance is supportive. States last drink of vodka was before admission and she usually drinks 1L vodka per day. Positive UDS for cocaine.   - Social work consult for Chem Dep, appreciate recs  - Continue methadone 40mg daily   - Continue MSSA protocol  - Ibuprofen 200mg Q6H prn pain and Tylenol 650mg Q4H prn pain  - Consider suboxone as outpatient possibly    #Bilateral foot pain with edema- Improved   Most likely due to fluids on admission contributing to leg pain and foot edema that is improved. Leg leg doppler is negative.   - Encourage ambulation    #Bipolar disorder I  #Anxiety  Last  seen by Dr. Rodríguez at [facility] 12/2017 and prescribed depakote at bedtime. Patient did not follow up with psychiatry, states she has not taken Depakote recently and takes Seroquel as needed (consistent with negative Depakote level on admission).  Per pharm reconciliation, patient has been prescribed Depakote 1250 mg at bedtime.   - Restart seroquel 25mg at bedtime and depakote 1250mg at bedtime    #Constipation  - Start miralax daily     #Hypoglycemia: resolved  #Hypernatremia: Resolved  #Hypokalemia: Resolved     #Elevated CK on admission: resolved  Most likely due to recent cocaine use.     #HTN- controlled   Per pharm, patient was taking amlodipine and labetalol.   - Continue amlodipine 10 mg daily   - Continue labetalol 100 mg orally twice a day    #Chronic Anemia- Stable at 10.7  Baseline Hgb 10-11. States she takes ferrous sulfate daily as an outpatient.  - Resume ferrous sulfate upon discharge     # Pain Assessment:  Current Pain Score 6/3/2018   Patient currently in pain? yes   Pain location Head   Pain descriptors Headache   Some encounter information is confidential and restricted. Go to Review Flowsheets activity to see all data.   - Elaina is experiencing pain due to headache and foot pain. She is receiving Tylenol and Ibuprofen for pain.  Pain management was discussed and the plan was created in a collaborative fashion.  Elaina's response to the current recommendations: mixed  - Please see the plan for pain management as documented above    Diet: Regular Diet Adult  Fluids: PO  DVT Prophylaxis: Encourage ambulation every shift  Code Status: Full Code    Disposition Plan   Expected discharge: 2 - 3 days, recommended to possible CD treatment once mental status at baseline and safe disposition plan/ TCU bed available. Dispo: 6/4/18          Entered: Alejandra Marcelino 06/03/2018, 7:42 AM   Information in the above section will display in the discharge planner report.      The patient's care  was discussed with the Attending Physician, Dr. Eaton.     Alejandra Marcelino  [facility]  Pager: [phone]  Please see sticky note for cross cover information    Interval History   Patient was seen and examined. She complains of headache that is improved with tylenol and ibuprofen. Continues to have pain in bilateral feet and legs but is improved with decreased swelling. Is interested in CD treatment for cocaine, alcohol, and opiod use. Tolerating a regular diet. Feels constipated with no BM in the past few days. Patient ambulating fine. Denies dizziness, lightheadedness tremor, cough, chest pain, abdominal pain, nausea, vomiting, diarrhea dysuria or difficulty urinating.    Physical Exam   Vital Signs: Temp: 96.7  F (35.9  C) Temp src: Oral BP: 144/89   Heart Rate: 64 Resp: 16 SpO2: 99 % O2 Device: None (Room air)    Weight: 144 lbs 2.89 oz  General Appearance: Alert, no acute distress  Respiratory: No increased work of breathing, CTA B/L  Cardiovascular: RRR +S1/S2, no murmurs or rubs  GI: +BS, soft, non-tender, non-distended, no guarding or rebound  Skin: Warm and dry, no rashes on exposed skin  Ext: No calf tenderness, trace pedal edema   Neuro: CN II-XII grossly intact, moving all extremities without any focal deficit   Psych: Normal speech, appropriate affect, thought content appears normal    Data   Medications       albuterol  2 puff Inhalation 4x Daily     amLODIPine  10 mg Oral Daily     diazepam  5-20 mg Intravenous See Admin Instructions    Or     diazepam  5-20 mg Oral See Admin Instructions     labetalol  100 mg Oral Q12H LISA     methadone  40 mg Oral Daily     sodium chloride (PF)  3 mL Intracatheter Q8H     Data     Recent Labs  Lab 06/02/18  0640 06/01/18  0631 05/31/18  1321   WBC 9.1 8.8 15.7*   HGB 10.4* 10.7* 11.4*   MCV 90 90 89    284 341   INR  --   --  1.16*    145* 145*   POTASSIUM 3.8 3.3* 3.7   CHLORIDE 109 112* 108   CO2 23 26 27    BUN 5* 6* 6*   CR 0.67 0.83 0.98   ANIONGAP 9 8 10   JOHANNY 7.6* 7.0* 8.2*   * 82 66*   ALBUMIN 2.2* 2.3* 3.0*   PROTTOTAL 5.4* 5.5* 6.8   BILITOTAL 0.2 0.2 0.3   ALKPHOS 46 45 53   ALT 23 27 34   AST 26 26 50*     Recent Results (from the past 24 hour(s))   US Lower Extremity Venous Duplex Left    Narrative    EXAMINATION: DOPPLER VENOUS ULTRASOUND OF THE LEFT LOWER EXTREMITY,  6/2/2018 6:15 PM     COMPARISON: None.    HISTORY: Left calf pain, evaluate for DVT    TECHNIQUE:  Gray-scale evaluation with compression, spectral flow, and  color Doppler assessment of the deep venous system of the left leg  from groin to knee, and then at the ankle.    FINDINGS:  In the left lower extremity, the common femoral, femoral, popliteal  and posterior tibial veins demonstrate normal compressibility and  blood flow.    Comparison evaluation of the right common femoral vein demonstrates  full compressibility and normal venous waveforms.      Impression    IMPRESSION: No evidence of left lower extremity deep venous  thrombosis.    I have personally reviewed the examination and initial interpretation  and I agree with the findings.    ANA LUISA MONTGOMERY MD

## 2018-06-03 NOTE — CONSULTS
Social Work Services Progress Note    Hospital Day: 4  Date of Initial Social Work Evaluation:  Not completed  Collaborated with:  Pt, pt's spouse, Intern    Data: Elaina Lin is a 37 year old female admitted on 5/31/2018. She has a history of bipolar I disorder, anxiety, polysubstance abuse (heroin, cocaine, alcohol), opioid use disorder on methadone and HTN who was admitted for akathisia and altered mental status most likely due to combination of cocaine and methadone.    Intervention:  Referral received to assist the pt with CD placement. SW met with the pt who states she had a rule 25 last week at [facility] and was recommended to a program. Conversation about if the pt has contacted the facility of choice and placed her name on a waiting list. Pt has not yet done this. I encouraged her to do so this week so she can get in as soon as possible. Pt also wanted to know if she could go to detox. I spoke with the bedside RN as well as paged the provider to inquire if the pt was still a candidate for detox as it appears she is not withdrawing anymore. Provider clarified that the pt wanted assistance with weaning from her methadone. Pt's CD program could also likely assist her with this. I provided a list of detox facilities in the [facility] and explained the pt would also have support with this at her CD program when she is enrolled.    Assessment:  SW to remain available for DC planning.    Plan:    Anticipated Disposition:  Home, no needs identified. Pt will work with her rule 25  to find CD placement    Barriers to d/c plan:  Medical readiness    Follow Up:  No SW needs at this time.    VERO Glez  [facility] Weekend   4A, 4C, 4E, 5A and 5B; pager [phone]  6A, 6B, 6C and 6D; pager [phone]  7A, 7B, 7C, 7D and 5C; pager [phone]

## 2018-06-03 NOTE — PROGRESS NOTES
MD notified regarding high BP ,hydralazine 10 mg IV given one time per MD order and scheduled Norvasc given. /115,Recheck not done at this time because pt. is not in the room.Pt went with her  outside.

## 2018-06-03 NOTE — PLAN OF CARE
Problem: Patient Care Overview  Goal: Plan of Care/Patient Progress Review  Outcome: No Change  0154-6110: Pt A&O. VSS on RA. Up independently to w/c. Outside to smoke x 2 with . Nausea and vomiting x 1 in the evening. Pt vomited up her evening dose of lebatalol and all of her dinner. BP stable. Nausea relief with oral zofran. C/o headache and itchiness, pt states this is typical for her when she is on methadone. Managed with ibuprofen, tylenol and and PO benadryl.

## 2018-06-04 LAB
ANION GAP SERPL CALCULATED.3IONS-SCNC: 8 MMOL/L (ref 3–14)
BASOPHILS # BLD AUTO: 0 10E9/L (ref 0–0.2)
BASOPHILS NFR BLD AUTO: 0.4 %
BUN SERPL-MCNC: 4 MG/DL (ref 7–30)
CALCIUM SERPL-MCNC: 8.4 MG/DL (ref 8.5–10.1)
CHLORIDE SERPL-SCNC: 106 MMOL/L (ref 94–109)
CO2 SERPL-SCNC: 27 MMOL/L (ref 20–32)
CREAT SERPL-MCNC: 0.63 MG/DL (ref 0.52–1.04)
DIFFERENTIAL METHOD BLD: ABNORMAL
EOSINOPHIL # BLD AUTO: 0.2 10E9/L (ref 0–0.7)
EOSINOPHIL NFR BLD AUTO: 1.9 %
ERYTHROCYTE [DISTWIDTH] IN BLOOD BY AUTOMATED COUNT: 16.9 % (ref 10–15)
GFR SERPL CREATININE-BSD FRML MDRD: >90 ML/MIN/1.7M2
GLUCOSE SERPL-MCNC: 77 MG/DL (ref 70–99)
HCT VFR BLD AUTO: 35.5 % (ref 35–47)
HGB BLD-MCNC: 11.1 G/DL (ref 11.7–15.7)
IMM GRANULOCYTES # BLD: 0 10E9/L (ref 0–0.4)
IMM GRANULOCYTES NFR BLD: 0.1 %
LYMPHOCYTES # BLD AUTO: 4 10E9/L (ref 0.8–5.3)
LYMPHOCYTES NFR BLD AUTO: 43.7 %
MCH RBC QN AUTO: 28 PG (ref 26.5–33)
MCHC RBC AUTO-ENTMCNC: 31.3 G/DL (ref 31.5–36.5)
MCV RBC AUTO: 90 FL (ref 78–100)
MONOCYTES # BLD AUTO: 0.5 10E9/L (ref 0–1.3)
MONOCYTES NFR BLD AUTO: 5.1 %
NEUTROPHILS # BLD AUTO: 4.5 10E9/L (ref 1.6–8.3)
NEUTROPHILS NFR BLD AUTO: 48.8 %
NRBC # BLD AUTO: 0 10*3/UL
NRBC BLD AUTO-RTO: 0 /100
PLATELET # BLD AUTO: 337 10E9/L (ref 150–450)
POTASSIUM SERPL-SCNC: 3.8 MMOL/L (ref 3.4–5.3)
RBC # BLD AUTO: 3.96 10E12/L (ref 3.8–5.2)
SODIUM SERPL-SCNC: 140 MMOL/L (ref 133–144)
WBC # BLD AUTO: 9.3 10E9/L (ref 4–11)

## 2018-06-04 PROCEDURE — 85025 COMPLETE CBC W/AUTO DIFF WBC: CPT | Performed by: FAMILY MEDICINE

## 2018-06-04 PROCEDURE — 25000125 ZZHC RX 250: Performed by: STUDENT IN AN ORGANIZED HEALTH CARE EDUCATION/TRAINING PROGRAM

## 2018-06-04 PROCEDURE — 25000132 ZZH RX MED GY IP 250 OP 250 PS 637: Performed by: INTERNAL MEDICINE

## 2018-06-04 PROCEDURE — 25000132 ZZH RX MED GY IP 250 OP 250 PS 637: Performed by: FAMILY MEDICINE

## 2018-06-04 PROCEDURE — 12000001 ZZH R&B MED SURG/OB UMMC

## 2018-06-04 PROCEDURE — 25000132 ZZH RX MED GY IP 250 OP 250 PS 637: Performed by: STUDENT IN AN ORGANIZED HEALTH CARE EDUCATION/TRAINING PROGRAM

## 2018-06-04 PROCEDURE — 80048 BASIC METABOLIC PNL TOTAL CA: CPT | Performed by: FAMILY MEDICINE

## 2018-06-04 PROCEDURE — 36415 COLL VENOUS BLD VENIPUNCTURE: CPT | Performed by: FAMILY MEDICINE

## 2018-06-04 RX ORDER — AMOXICILLIN 250 MG
1 CAPSULE ORAL 2 TIMES DAILY PRN
Qty: 100 TABLET | Refills: 0 | Status: SHIPPED | OUTPATIENT
Start: 2018-06-04 | End: 2018-06-05

## 2018-06-04 RX ORDER — FERROUS SULFATE 325(65) MG
325 TABLET ORAL
Qty: 30 TABLET | Refills: 0 | Status: SHIPPED | OUTPATIENT
Start: 2018-06-04 | End: 2018-06-04

## 2018-06-04 RX ORDER — AMOXICILLIN 250 MG
1 CAPSULE ORAL AT BEDTIME
Status: DISCONTINUED | OUTPATIENT
Start: 2018-06-04 | End: 2018-06-05 | Stop reason: HOSPADM

## 2018-06-04 RX ORDER — ALBUTEROL SULFATE 90 UG/1
2 AEROSOL, METERED RESPIRATORY (INHALATION) 4 TIMES DAILY PRN
Qty: 8 G | Refills: 0 | Status: SHIPPED | OUTPATIENT
Start: 2018-06-04 | End: 2018-06-05

## 2018-06-04 RX ORDER — QUETIAPINE FUMARATE 25 MG/1
25 TABLET, FILM COATED ORAL DAILY
Qty: 30 TABLET | Refills: 0 | Status: SHIPPED | OUTPATIENT
Start: 2018-06-04 | End: 2018-06-04

## 2018-06-04 RX ORDER — DIPHENHYDRAMINE HCL 25 MG
25-50 CAPSULE ORAL EVERY 6 HOURS PRN
Qty: 56 CAPSULE | Refills: 0 | Status: SHIPPED | OUTPATIENT
Start: 2018-06-04 | End: 2018-06-04

## 2018-06-04 RX ORDER — QUETIAPINE FUMARATE 25 MG/1
25 TABLET, FILM COATED ORAL DAILY
Qty: 30 TABLET | Refills: 0 | Status: SHIPPED | OUTPATIENT
Start: 2018-06-04 | End: 2018-06-05

## 2018-06-04 RX ORDER — BISACODYL 10 MG
10 SUPPOSITORY, RECTAL RECTAL ONCE
Status: COMPLETED | OUTPATIENT
Start: 2018-06-04 | End: 2018-06-04

## 2018-06-04 RX ORDER — ONDANSETRON 4 MG/1
4 TABLET, ORALLY DISINTEGRATING ORAL EVERY 6 HOURS PRN
Qty: 30 TABLET | Refills: 0 | Status: SHIPPED | OUTPATIENT
Start: 2018-06-04 | End: 2018-06-04

## 2018-06-04 RX ORDER — LABETALOL 100 MG/1
100 TABLET, FILM COATED ORAL
Status: DISCONTINUED | OUTPATIENT
Start: 2018-06-04 | End: 2018-06-05 | Stop reason: HOSPADM

## 2018-06-04 RX ORDER — AMOXICILLIN 250 MG
1 CAPSULE ORAL 2 TIMES DAILY PRN
Qty: 100 TABLET | Refills: 0 | Status: SHIPPED | OUTPATIENT
Start: 2018-06-04 | End: 2018-06-04

## 2018-06-04 RX ORDER — AMLODIPINE BESYLATE 10 MG/1
10 TABLET ORAL DAILY
Qty: 30 TABLET | Refills: 0 | Status: SHIPPED | OUTPATIENT
Start: 2018-06-04 | End: 2018-06-05

## 2018-06-04 RX ORDER — ONDANSETRON 4 MG/1
4 TABLET, ORALLY DISINTEGRATING ORAL EVERY 6 HOURS PRN
Qty: 30 TABLET | Refills: 0 | Status: SHIPPED | OUTPATIENT
Start: 2018-06-04 | End: 2018-06-05

## 2018-06-04 RX ORDER — AMLODIPINE BESYLATE 10 MG/1
10 TABLET ORAL DAILY
Qty: 30 TABLET | Refills: 0 | Status: SHIPPED | OUTPATIENT
Start: 2018-06-04 | End: 2018-06-04

## 2018-06-04 RX ORDER — FERROUS SULFATE 325(65) MG
325 TABLET ORAL
Qty: 30 TABLET | Refills: 0 | Status: SHIPPED | OUTPATIENT
Start: 2018-06-04 | End: 2018-06-05

## 2018-06-04 RX ORDER — DIVALPROEX SODIUM 250 MG/1
1250 TABLET, EXTENDED RELEASE ORAL AT BEDTIME
Qty: 150 TABLET | Refills: 0 | Status: SHIPPED | OUTPATIENT
Start: 2018-06-04 | End: 2018-06-04

## 2018-06-04 RX ORDER — DIVALPROEX SODIUM 250 MG/1
1250 TABLET, EXTENDED RELEASE ORAL AT BEDTIME
Qty: 150 TABLET | Refills: 0 | Status: SHIPPED | OUTPATIENT
Start: 2018-06-04 | End: 2018-06-05

## 2018-06-04 RX ORDER — DIPHENHYDRAMINE HCL 25 MG
25-50 CAPSULE ORAL EVERY 6 HOURS PRN
Qty: 56 CAPSULE | Refills: 0 | Status: SHIPPED | OUTPATIENT
Start: 2018-06-04 | End: 2018-06-05

## 2018-06-04 RX ORDER — ALBUTEROL SULFATE 90 UG/1
2 AEROSOL, METERED RESPIRATORY (INHALATION) 4 TIMES DAILY PRN
Qty: 8 G | Refills: 0 | Status: SHIPPED | OUTPATIENT
Start: 2018-06-04 | End: 2018-06-04

## 2018-06-04 RX ADMIN — ONDANSETRON 4 MG: 4 TABLET, ORALLY DISINTEGRATING ORAL at 11:19

## 2018-06-04 RX ADMIN — SENNOSIDES AND DOCUSATE SODIUM 1 TABLET: 8.6; 5 TABLET ORAL at 21:54

## 2018-06-04 RX ADMIN — DIPHENHYDRAMINE HYDROCHLORIDE 50 MG: 25 CAPSULE ORAL at 18:30

## 2018-06-04 RX ADMIN — IBUPROFEN 400 MG: 200 TABLET, FILM COATED ORAL at 00:29

## 2018-06-04 RX ADMIN — DIVALPROEX SODIUM 1250 MG: 250 TABLET, FILM COATED, EXTENDED RELEASE ORAL at 21:54

## 2018-06-04 RX ADMIN — ALBUTEROL SULFATE 2 PUFF: 90 AEROSOL, METERED RESPIRATORY (INHALATION) at 08:32

## 2018-06-04 RX ADMIN — METHADONE HYDROCHLORIDE 40 MG: 5 SOLUTION ORAL at 11:22

## 2018-06-04 RX ADMIN — ACETAMINOPHEN 650 MG: 325 TABLET, FILM COATED ORAL at 00:29

## 2018-06-04 RX ADMIN — POLYETHYLENE GLYCOL 3350 17 G: 17 POWDER, FOR SOLUTION ORAL at 08:56

## 2018-06-04 RX ADMIN — AMLODIPINE BESYLATE 10 MG: 10 TABLET ORAL at 11:21

## 2018-06-04 RX ADMIN — SENNOSIDES AND DOCUSATE SODIUM 1 TABLET: 8.6; 5 TABLET ORAL at 00:56

## 2018-06-04 RX ADMIN — IBUPROFEN 400 MG: 200 TABLET, FILM COATED ORAL at 18:30

## 2018-06-04 RX ADMIN — BISACODYL 10 MG: 10 SUPPOSITORY RECTAL at 08:55

## 2018-06-04 RX ADMIN — ALBUTEROL SULFATE 2 PUFF: 90 AEROSOL, METERED RESPIRATORY (INHALATION) at 11:21

## 2018-06-04 NOTE — PLAN OF CARE
Problem: Patient Care Overview  Goal: Plan of Care/Patient Progress Review  Outcome: Improving  VSS,pt. is alert and oriented ,neuro check WNL,occasionally mood swings noted.  at the bed side supportive .patient  was   outside to smoke multiple times.LS clear,BS+,reported spontaneous urination tylenol ,ibuprofen given for headache per request and benadryl for itching.pt had good appetite.

## 2018-06-04 NOTE — PLAN OF CARE
Problem: Patient Care Overview  Goal: Plan of Care/Patient Progress Review  Outcome: No Change  Pt is A&O x 4, VSS and on room air. Oral Methadon given, PRN Zofran given prior to Methadone per pt request. Scheduled suppository given to pt to self-administer, no BM my shift. R PIV SL. Pt tolerating regular diet well. Up independent w/ WC/walker in hallway. Calls appropriately. Denies any pain. Pt not scoring on MSSA. Plan to d/c home this afternoon. Continue to monitor and POC.

## 2018-06-04 NOTE — PLAN OF CARE
Problem: Patient Care Overview  Goal: Plan of Care/Patient Progress Review  Outcome: No Change  [id number]: Pt A&O. VSS on RA. Up independently with walker or w/c. Goes outside to smoke with . C/o headache, managed with tylenol and ibuprofen x 1. Denies nausea. C/o itchiness, managed with calamine lotion PRN. Refused lebatalol d/t BP of 106/78. Refused seroquel. Depakote restarted at HS. C/o discomfort d/t constipation, senna given x 1 with no results. Pt hoping to straight to CD from here, SW following.

## 2018-06-04 NOTE — DISCHARGE SUMMARY
[facility], [facility] Discharge Summary- [facility]  Service    Date of Admission:  5/31/2018  Date of Service: 6/4/2018  Date of Discharge:  6/4/2018  Discharging Attending Provider: Dr. Leavitt  Discharge Team: Jennifer    Discharge Diagnoses   Opioid Dependence  Polysubstance Abuse  Opioid Use Disorder   Akathisia     Follow-ups Needed After Discharge   Follow up with PCP ( provider) within 7 days of discharge   Follow up with [facility]    Hospital Course   Elaina Lin was admitted on 5/31/2018. She has a history of bipolar I disorder, anxiety, polysubstance abuse (heroin, cocaine, alcohol), opioid use disorder on methadone and HTN who was admitted for akathisia and altered mental status most likely due to combination of cocaine and methadone. She was admitted to ICU on 5/31/18 and transferred to our [facility] service 6/1/18. The following problems were addressed during her hospitalization:     #Akathisia: Resolved  #Depressed consciousness: Resolved  Patient presented with uncontrolled body movements and altered mental status. She required sedation with haldol, ketamine and ativan for agitation in the ED. She was transferred to the ICU on 5/31/18 and she was transferred to [facility] Service on 6/1/18 with resolved mental status changes.  Neurology was consulted who felt that the akathisia was most likely related to methadone with the combination with cocaine. Head CT was obtained and normal. Methadone was held until mental status improved. She no longer has akathisia and mental status is now normal on discharge. Psychiatry- Chemical dependency and social work were consulted.   - Discussed importance of abstinence of illicit drugs especially cocaine with her methadone  - She is currently working with her Rule 25  for CD treatment (states she plans to go into rehab in one week when a bed is available)     #Polysubstance Abuse  #Opioid  Use Disorder on methadone maintenance   Her methadone was initially held due to altered mental status and akathisia. Her methadone was resumed when she was clinically improved. Positive UDS for cocaine. MSSA protocol was placed. Psychiatry- Chemical dependency and social work were consulted.   - Continue methadone 40mg daily (receives from [facility])  - Continue to work with Rule 25  for CD treatment     #Bilateral foot pain with edema- Resolved   Most likely due to fluids on admission contributing to leg pain and foot edema that is improved. Leg leg doppler is negative.      #Bipolar disorder I  #Anxiety  Last seen by Dr. Rodríguez at [facility] 12/2017 and prescribed depakote and seroquel at bedtime. Patient did not follow up with psychiatry, states she has not taken Depakote recently and takes Seroquel as needed (consistent with negative Depakote level on admission).  Per pharm reconciliation, patient has been prescribed Depakote 1250 mg at bedtime and seroquel 25mg at bedtime. These medications were initially held due to altered mental status and has since been resumed one day prior to discharge.   - Continue seroquel 25mg at bedtime and depakote 1250mg at bedtime  - Follow up with Psychiatry as outpatient and PCP     #Constipation- Resolved  - Take senna-docusate BID PRN constipation      #Hypoglycemia: resolved  #Hypernatremia: Resolved  #Hypokalemia: Resolved    #Elevated CK on admission: resolved  Most likely due to recent cocaine use. Electrolyte replacement protocol. Labs were stable on discharge.      #HTN- controlled   Per pharm, patient was taking amlodipine and labetalol. BP was stable on amlodipine and slightly soft BP ('s) and controlled during the hospitalization with only amlodipine.   - Decrease amlodipine 5 mg daily until PCP follow up  - Discontinue Labetalol 100 mg orally twice a day until PCP follow up  - Follow up with PCP within one week of discharge  for repeat BP and consider restarting labetalol if elevated     #Chronic Anemia- Stable   Baseline Hgb 10-11. States she takes ferrous sulfate daily as an outpatient.  - Resume ferrous sulfate upon discharge    #Insurance Issues  Patient does not currently have insurance since she needs to go to [facility] and present documents of identity. Care coordinator helped assist with this.  - Follow up with [facility] regarding documents of identity     # Discharge Pain Plan:   - Patient currently has NO PAIN and is not being prescribed pain medications on discharge.    Consultations This Hospital Stay   SOCIAL WORK IP CONSULT  CHEMICAL DEPENDENCY IP CONSULT  VASCULAR ACCESS CARE ADULT IP CONSULT  MEDICATION HISTORY IP PHARMACY CONSULT  MEDICATION HISTORY IP PHARMACY CONSULT     Code Status   Full Code     The patient was discussed with Dr. [facility] Inpatient [facility]  [facility]   Pager:[phone]_  ___________________________________________________________________  Review of Systems:  CONSTITUTIONAL: NEGATIVE for fever, chills +fatigue  INTEGUMENTARY/SKIN: NEGATIVE for worrisome rashes  EYES: NEGATIVE for vision changes  ENT/MOUTH: NEGATIVE for ear, mouth and throat problems  RESP: NEGATIVE for significant cough or SOB  CV: NEGATIVE for chest pain, or peripheral edema  GI: NEGATIVE for nausea, abdominal pain +constipation  : NEGATIVE for frequency, dysuria  MUSCULOSKELETAL: NEGATIVE for significant arthralgias or myalgia  NEURO: NEGATIVE for weakness, dizziness, lightheadedness   HEME/ALLERGY: NEGATIVE for bleeding problems  PSYCHIATRIC: NEGATIVE for changes in mood or affect    Physical Exam   Vital Signs: Temp: 96.8  F (36  C) Temp src: Oral BP: 114/64 Pulse: 66 Heart Rate: 72 Resp: 18 SpO2: 99 % O2 Device: None (Room air)    Weight: 144 lbs 0 oz    General Appearance: Alert, no acute distress  Respiratory: No increased work of breathing, CTA  B/L  Cardiovascular: RRR +S1/S2, no murmurs or rubs  GI: +BS, soft, non-tender, non-distended, no guarding or rebound  Skin: Warm and dry, no rashes on exposed skin  Ext: no pedal edema, no calf tenderness   Neuro: CN II-XII grossly intact, moving all extremities without any focal deficit   Psych: Normal speech, appropriate affect, thought content appears normal    Significant Results and Procedures   Results for orders placed or performed during the hospital encounter of 05/31/18   Head CT w/o contrast    Narrative    CT OF THE HEAD WITHOUT CONTRAST 5/31/2018 3:14 PM     COMPARISON: None.    HISTORY: Altered mental status.     TECHNIQUE: Axial CT images of the head from the skull base to the  vertex were acquired without IV contrast.    FINDINGS: The ventricles and basal cisterns are within normal limits  in configuration. There is no midline shift. There are no extra-axial  fluid collections.  Gray-white differentiation is well maintained.    No intracranial hemorrhage, mass or recent infarct.    The visualized paranasal sinuses are well-aerated. There is no  mastoiditis. There are no fractures of the visualized bones.      Impression    IMPRESSION: Normal head CT.      Radiation dose for this scan was reduced using automated exposure  control, adjustment of the mA and/or kV according to patient size, or  iterative reconstruction technique    MICHAEL STEELE MD   US Lower Extremity Venous Duplex Left    Narrative    EXAMINATION: DOPPLER VENOUS ULTRASOUND OF THE LEFT LOWER EXTREMITY,  6/2/2018 6:15 PM     COMPARISON: None.    HISTORY: Left calf pain, evaluate for DVT    TECHNIQUE:  Gray-scale evaluation with compression, spectral flow, and  color Doppler assessment of the deep venous system of the left leg  from groin to knee, and then at the ankle.    FINDINGS:  In the left lower extremity, the common femoral, femoral, popliteal  and posterior tibial veins demonstrate normal compressibility and  blood  flow.    Comparison evaluation of the right common femoral vein demonstrates  full compressibility and normal venous waveforms.      Impression    IMPRESSION: No evidence of left lower extremity deep venous  thrombosis.    I have personally reviewed the examination and initial interpretation  and I agree with the findings.    ANA LUISA MONTGOMERY MD       Pending Results     Unresulted Labs Ordered in the Past 30 Days of this Admission     No orders found from 4/1/2018 to 6/1/2018.        Primary Care Physician   Physician No Ref-Primary    Discharge Disposition   Discharged to home  Condition at discharge: Stable    Discharge Orders     Reason for your hospital stay   You were hospitalized for abnormal body movements likely due to combination of methadone and cocaine. You clinically improved and was discharged to home with plans for chemical dependency treatment.     Adult [facility] Follow-up and recommended labs and tests   Follow up with primary care provider, at [facility], within 7 days for hospital follow- up.  No follow up labs or test are needed.      Appointments on [place] and/or [place] campus (with [facility] or [facility] provider or service). Call [phone] if you haven't heard regarding these appointments within 7 days of discharge.     Activity   Your activity upon discharge: activity as tolerated     Full Code     Diet   Follow this diet upon discharge: Orders Placed This Encounter     Regular Diet Adult       Discharge Medications   Discharge Medication List as of 6/5/2018  1:58 PM      CONTINUE these medications which have CHANGED    Details   albuterol (PROAIR HFA/PROVENTIL HFA/VENTOLIN HFA) 108 (90 Base) MCG/ACT Inhaler Inhale 2 puffs into the lungs 4 times daily as needed for other (dyspnea), Disp-8 g, R-0, E-Prescribe      amLODIPine (NORVASC) 5 MG tablet Take 1 tablet (5 mg) by mouth daily, Disp-30 tablet, R-0, E-Prescribe      diphenhydrAMINE (BENADRYL) 25 MG capsule Take 1-2 capsules (25-50 mg)  by mouth every 6 hours as needed for itching, Disp-56 capsule, R-0, E-Prescribe      divalproex sodium extended-release (DEPAKOTE ER) 250 MG 24 hr tablet Take 5 tablets (1,250 mg) by mouth At Bedtime, Disp-150 tablet, R-0, E-Prescribe      ferrous sulfate (IRON) 325 (65 Fe) MG tablet Take 1 tablet (325 mg) by mouth daily (with breakfast), Disp-30 tablet, R-0, E-Prescribe      ondansetron (ZOFRAN-ODT) 4 MG ODT tab Take 1 tablet (4 mg) by mouth every 6 hours as needed for nausea or vomiting, Disp-30 tablet, R-0, E-Prescribe      QUEtiapine (SEROQUEL) 25 MG tablet Take 1 tablet (25 mg) by mouth daily, Disp-30 tablet, R-0, E-Prescribe      senna-docusate (SENOKOT-S;PERICOLACE) 8.6-50 MG per tablet Take 1 tablet by mouth 2 times daily as needed for constipation, Disp-100 tablet, R-0, E-Prescribe         CONTINUE these medications which have NOT CHANGED    Details   methadone (DOLPHINE) 5 MG/5ML solution Take 40 mg by mouth daily, Historical      EPINEPHrine (EPIPEN/ADRENACLICK/OR ANY BX GENERIC EQUIV) 0.3 MG/0.3ML injection 2-pack Inject 0.3 mLs (0.3 mg) into the muscle once as needed for anaphylaxis, Disp-0.6 mL, R-1, E-Prescribe      naloxone (NARCAN) 1 mg/mL for intranasal kit (2 syringes with 2 mucosal atomizer device) In opioid overdose put cone in nostril and push 1/2 of contents into each nostril.  Repeat every 3 min if no response until help arrives., Disp-1 kit, R-0, E-PrescribeFor intranasal administration: Dispense 2 naloxone injection 2 mg/2 mL syringes.  Incl ude 2 mucosal atomization devices (MAD-Nasal).         STOP taking these medications       Divalproex Sodium (DEPAKOTE PO) Comments:   Reason for Stopping:         LABETALOL HCL PO Comments:   Reason for Stopping:             Allergies   Allergies   Allergen Reactions     Tomato Anaphylaxis     Compazine [Prochlorperazine]

## 2018-06-05 ENCOUNTER — CARE COORDINATION (OUTPATIENT)
Dept: CARE COORDINATION | Facility: CLINIC | Age: 38
End: 2018-06-05

## 2018-06-05 VITALS
BODY MASS INDEX: 23.79 KG/M2 | OXYGEN SATURATION: 96 % | HEIGHT: 65 IN | DIASTOLIC BLOOD PRESSURE: 71 MMHG | HEART RATE: 65 BPM | TEMPERATURE: 95.1 F | RESPIRATION RATE: 18 BRPM | SYSTOLIC BLOOD PRESSURE: 103 MMHG | WEIGHT: 142.8 LBS

## 2018-06-05 PROCEDURE — 25000132 ZZH RX MED GY IP 250 OP 250 PS 637: Performed by: FAMILY MEDICINE

## 2018-06-05 PROCEDURE — 25000132 ZZH RX MED GY IP 250 OP 250 PS 637: Performed by: STUDENT IN AN ORGANIZED HEALTH CARE EDUCATION/TRAINING PROGRAM

## 2018-06-05 PROCEDURE — 25000125 ZZHC RX 250: Performed by: STUDENT IN AN ORGANIZED HEALTH CARE EDUCATION/TRAINING PROGRAM

## 2018-06-05 RX ORDER — ALBUTEROL SULFATE 90 UG/1
2 AEROSOL, METERED RESPIRATORY (INHALATION) 4 TIMES DAILY PRN
Qty: 8 G | Refills: 0 | Status: SHIPPED | OUTPATIENT
Start: 2018-06-05 | End: 2022-01-17

## 2018-06-05 RX ORDER — AMOXICILLIN 250 MG
1 CAPSULE ORAL 2 TIMES DAILY PRN
Qty: 100 TABLET | Refills: 0 | Status: SHIPPED | OUTPATIENT
Start: 2018-06-05 | End: 2020-11-13

## 2018-06-05 RX ORDER — ONDANSETRON 4 MG/1
4 TABLET, ORALLY DISINTEGRATING ORAL EVERY 6 HOURS PRN
Qty: 30 TABLET | Refills: 0 | Status: SHIPPED | OUTPATIENT
Start: 2018-06-05 | End: 2020-11-13

## 2018-06-05 RX ORDER — DIPHENHYDRAMINE HCL 25 MG
25-50 CAPSULE ORAL EVERY 6 HOURS PRN
Qty: 56 CAPSULE | Refills: 0 | Status: ON HOLD | OUTPATIENT
Start: 2018-06-05 | End: 2020-11-14

## 2018-06-05 RX ORDER — QUETIAPINE FUMARATE 25 MG/1
25 TABLET, FILM COATED ORAL DAILY
Qty: 30 TABLET | Refills: 0 | Status: ON HOLD | OUTPATIENT
Start: 2018-06-05 | End: 2020-11-14

## 2018-06-05 RX ORDER — DIVALPROEX SODIUM 250 MG/1
1250 TABLET, EXTENDED RELEASE ORAL AT BEDTIME
Qty: 150 TABLET | Refills: 0 | Status: ON HOLD | OUTPATIENT
Start: 2018-06-05 | End: 2020-11-14

## 2018-06-05 RX ORDER — FERROUS SULFATE 325(65) MG
325 TABLET ORAL
Qty: 30 TABLET | Refills: 0 | Status: ON HOLD | OUTPATIENT
Start: 2018-06-05 | End: 2020-11-14

## 2018-06-05 RX ORDER — AMLODIPINE BESYLATE 5 MG/1
5 TABLET ORAL DAILY
Qty: 30 TABLET | Refills: 0 | Status: SHIPPED | OUTPATIENT
Start: 2018-06-05 | End: 2020-07-08

## 2018-06-05 RX ADMIN — POLYETHYLENE GLYCOL 3350 17 G: 17 POWDER, FOR SOLUTION ORAL at 09:17

## 2018-06-05 RX ADMIN — METHADONE HYDROCHLORIDE 40 MG: 5 SOLUTION ORAL at 09:37

## 2018-06-05 RX ADMIN — DIPHENHYDRAMINE HYDROCHLORIDE 50 MG: 25 CAPSULE ORAL at 00:02

## 2018-06-05 RX ADMIN — SODIUM PHOSPHATE 1 ENEMA: 7; 19 ENEMA RECTAL at 05:27

## 2018-06-05 RX ADMIN — ALBUTEROL SULFATE 2 PUFF: 90 AEROSOL, METERED RESPIRATORY (INHALATION) at 09:20

## 2018-06-05 RX ADMIN — ONDANSETRON 4 MG: 4 TABLET, ORALLY DISINTEGRATING ORAL at 09:13

## 2018-06-05 NOTE — PROGRESS NOTES
Progress Note for 6/4    Plan was for patient to be discharged but did not leave.  She was seen and examined with team in the morning of 6/4.    Interval History     Patient was seen and examined. She is constipated and would like to try a suppository. Is interested in CD treatment for cocaine, alcohol, and opiod use. Tolerating a regular diet. Patient ambulating fine. Denies dizziness, lightheadedness, tremor, cough, chest pain, abdominal pain, nausea, vomiting, diarrhea, dysuria, or difficulty urinating.     Physical Exam     Vital Signs: VSS  Weight: 144 lbs 2.89 oz  General Appearance:  Alert, no acute distress  Respiratory: No increased work of breathing, CTA B/L  Cardiovascular: RRR +S1/S2, no murmurs or rubs  GI: +BS, soft, non-tender, non-distended, no guarding or rebound  Skin: Warm and dry, no rashes on exposed skin  Ext: No calf tenderness, no pedal edema   Neuro: CN II-XII grossly intact, moving all extremities without any focal deficit   Psych: Normal speech, appropriate affect, thought content appears normal    A/P:     Elaina Lin is a 37 year old female admitted on 5/31/2018. She has a history of bipolar I disorder, anxiety, polysubstance abuse (heroin, cocaine, alcohol), opioid use disorder on methadone and HTN who was admitted for akathisia and altered mental status most likely due to combination of cocaine and methadone. She was admitted to ICU and transferred to our service 6/1. Clinically improved awaiting safe discharge.      #Akathisia: improved  #Depressed consciousness: improved  Patient presented with uncontrolled body movements and altered mental status. Neurology was consulted who felt that the akathisia was most likely related to methadone with the combination with cocaine. She no longer has akathisia and mental status has improved.   - Neurology consulted, appreciate recs  - Social work consult for CD treatment potentially      #Polysubstance Abuse  #Opioid Use Disorder on methadone  maintenance   She is currently interested in pursuing chemical dependency treatment and would like to further discuss with social work and CD team about potential treatment. Her fiance is supportive. States last drink of vodka was before admission and she usually drinks 1L vodka per day, no s/s of withdrawal. Positive UDS for cocaine.   - Social work consult for Chem Dep, appreciate recs  - Continue methadone 40mg daily   - Discontinue MSSA protocol  - Ibuprofen 200mg Q6H prn pain and Tylenol 650mg Q4H prn pain  - Consider suboxone as outpatient possibly     #Bilateral foot pain with edema- Improved   Most likely due to fluids on admission contributing to leg pain and foot edema that is improved. Leg leg doppler is negative.   - Encourage ambulation     #Bipolar disorder I  #Anxiety  Last seen by Dr. Rodríguez at [facility] 12/2017 and prescribed depakote at bedtime. Patient did not follow up with psychiatry, states she has not taken Depakote recently and takes Seroquel as needed (consistent with negative Depakote level on admission).  Per pharm reconciliation, patient has been prescribed Depakote 1250 mg at bedtime.   - Restart seroquel 25mg at bedtime and depakote 1250mg at bedtime     #Constipation  - Continue miralax daily  - Dulcolax suppository PRN     #Hypoglycemia: resolved  #Hypernatremia: Resolved  #Hypokalemia: Resolved      #Elevated CK on admission: resolved  Most likely due to recent cocaine use.      #HTN- controlled   Per pharm, patient was taking amlodipine and labetalol.   - Continue amlodipine 10 mg daily   - Hold labetalol 100 mg orally twice a day     #Chronic Anemia- Stable at 10.7  Baseline Hgb 10-11. States she takes ferrous sulfate daily as an outpatient.  - Resume ferrous sulfate upon discharge      # Pain Assessment:  Current Pain Score 6/3/2018   Patient currently in pain? yes   Pain location Head   Pain descriptors Headache   Some encounter information is confidential and  restricted. Go to Review Flowsheets activity to see all data.   - Elaina is experiencing pain due to headache and foot pain. She is receiving Tylenol and Ibuprofen for pain.  Pain management was discussed and the plan was created in a collaborative fashion.  Elaina's response to the current recommendations: mixed  - Please see the plan for pain management as documented above    the plan for pain management as documented above    Diet: Regular Diet Adult  Fluids: PO  DVT Prophylaxis: Encourage ambulation every shift  Code Status: Full Code       Disposition Plan     Expected discharge: was 6/4 but patient did not leave, recommended to possible CD treatment once mental status at baseline and safe disposition plan/ TCU bed available. Dispo: 6/4/18      Alejandra Marcelino DO  [facility] PGY2

## 2018-06-05 NOTE — PROGRESS NOTES
Care Coordinator Discharge Note    Admission Date/Time:  5/31/2018  Attending MD:  Kaela Leavitt DO    Data  Chart reviewed, discussed with interdisciplinary team.   Patient was admitted for:    Movement disorder  Opioid dependence with intoxication with complication (H)  Substance abuse  Cough  Bipolar affective disorder, remission status unspecified (H)  Major depressive disorder, recurrent episode, severe, without mention of psychotic behavior  Iron deficiency anemia secondary to inadequate dietary iron intake  HTN, goal below 140/90.    Coordination of Care     Patient was RESTRICTED from February 1st to March 27th 2018  She became unrestricted due to her insurance terminating.  When her insurance becomes active again she is again restricted.     Patient is restricted to [facility] Clinician: Vidya Johnson NP  Patient is restricted to [facility]  Patient is restricted to [facility]    Patient can  medications from any pharmacy, she will have to pay out of pocket.  The below instructions to assist patient with completing her MA application were added to the d/c AVS:    Please go to [facility] and speak to a financial  regarding your Medical Assistance application.  Reference case number [id number], application date 4-23-18.  According to the [facility] records you applied under the last name Niko.   They need you to produce a birth certificate and name change documents to process the application any further.    No further RNCC discharge needs.    Plan  Anticipated Discharge Date:  6/5/2018  Anticipated Discharge Plan:  Home    Tricia MARTINEZ RN PHN  Patient Care Management Coordinator  [address]  Phone: [phone] / Pager: [phone]      .

## 2018-06-05 NOTE — PLAN OF CARE
Problem: Patient Care Overview  Goal: Plan of Care/Patient Progress Review  Outcome: No Change  Pt is A&O x 4, VSS and on room air. Oral Methadon given, PRN Zofran given prior to Methadone per pt request. Scheduled suppository given, no BM. R PIV SL. Pt tolerating regular diet well. Up independent w/ WC/walker in hallway. Had 1 episode of emesis- pt states its d/t constipation.  Calls appropriately. Denies any pain. Pt not scoring on MSSA. Continue to monitor and POC

## 2018-06-05 NOTE — PLAN OF CARE
Problem: Patient Care Overview  Goal: Plan of Care/Patient Progress Review  Outcome: Adequate for Discharge Date Met: 06/05/18  Pt is A&O x 4, VSS and on room air. Reviewed d/c instructions w/ pt and answered all questions. Pt gave verbal understanding of instructions. Pt gathered all belongings and brought home. PIV removed. Pt is adequate for d/c.

## 2018-06-05 NOTE — PLAN OF CARE
Problem: Confusion, Acute (Adult)  Goal: Identify Related Risk Factors and Signs and Symptoms  Related risk factors and signs and symptoms are identified upon initiation of Human Response Clinical Practice Guideline (CPG).   Outcome: No Change  Alert and oriented x 4. Denies pain and nausea. Up independently in room and goes off the PCU on wheel chair. Has no iv at this time. Constipated and enema given this morning, awaiting result. MSSA score was 2. Significant other at bedside. Pt stated that she has good result from the enema.

## 2018-06-05 NOTE — DISCHARGE INSTRUCTIONS
Please go to [facility] and speak to a financial  regarding your Medical Assistance application.  Reference case number [id number], application date 4-23-18.  According to the [facility] records you applied under the last name Niko.   They need you to produce a birth certificate and name change documents to process the application any further.

## 2018-06-05 NOTE — PROGRESS NOTES
Social Work Services Progress Note    Hospital Day: 5  Date of Initial Social Work Evaluation:  06/03/18  Collaborated with:  RN, [facility] team, patient    Data:  On-call SW received page for d/c transportation for pt.    Intervention:  SW met with pt and her spouse at bedside.  Pt and spouse explained intent to d/c to [facility] where they have two beds available.  Pt's spouse requesting to stay in hospital because they do not have a couples bed available, and SW clarified that since this shelter still has two beds available and pt is medically cleared for discharge, this is not a reason to stay in the hospital.  Pt is ambulating without assistance, and as verified by medical staff, safe to transport via public transportation.  SW provided pt with two bus tokens, one for pt and one for her spouse as they are both d/c to shelter.  Patient and spouse asked for cab vouchers and SW explained multiple times that they did not meet medical qualifications for a cab, and this was the only approved transportation.    DESIRE later received call from RN that pharmacy unable to fill pt's medications due to pt having restricted medical coverage.  Per chart review, no insurance coverage listed.  DESIRE spoke with pharmacy who informed that pt is showing as having active Medicaid (# [id number]) and account is flagged as restricted to [facility].  Therefore pharmacy unable to fill pt's medications, and SW can also not allowed to assist with medications.    DESIRE called pt's insurance to request an override ([phone]) and this office was already closed for the day.    DESIRE paged Lisandra's on-call pager and explained that team needs to have a physician to physician call with pt's restricted provider to resolve pt's medication issues, and possibly request a one-time release.    Assessment:  Due to pt's restricted provider and pharmacy, prescriptions were unable to be filled evening of 6/4/18.    Plan:     Anticipated Disposition:  Home, no needs identified    Barriers to d/c plan:  Approval for prescription medications    Follow Up:  Floor SW will continue to follow and assist with d/c planning as needed.    Renetta Dubois, [facility]  Emergency Department   P: [phone]  Pager: [phone]

## 2018-06-07 ENCOUNTER — HOSPITAL ENCOUNTER (EMERGENCY)
Facility: CLINIC | Age: 38
Discharge: HOME | End: 2018-06-08
Attending: EMERGENCY MEDICINE | Admitting: EMERGENCY MEDICINE
Payer: MEDICAID

## 2018-06-07 DIAGNOSIS — R60.0 BILATERAL LOWER EXTREMITY EDEMA: ICD-10-CM

## 2018-06-07 DIAGNOSIS — K59.00 CONSTIPATION, UNSPECIFIED CONSTIPATION TYPE: ICD-10-CM

## 2018-06-07 DIAGNOSIS — K90.3 PANCREATIC COLIPASE DEFICIENCY: ICD-10-CM

## 2018-06-07 PROCEDURE — 96374 THER/PROPH/DIAG INJ IV PUSH: CPT

## 2018-06-07 PROCEDURE — 83880 ASSAY OF NATRIURETIC PEPTIDE: CPT | Performed by: EMERGENCY MEDICINE

## 2018-06-07 PROCEDURE — 99284 EMERGENCY DEPT VISIT MOD MDM: CPT | Mod: 25

## 2018-06-07 PROCEDURE — 40000556 ZZH STATISTIC PERIPHERAL IV START W US GUIDANCE

## 2018-06-07 PROCEDURE — 99285 EMERGENCY DEPT VISIT HI MDM: CPT | Mod: Z6 | Performed by: EMERGENCY MEDICINE

## 2018-06-07 PROCEDURE — 25000128 H RX IP 250 OP 636: Performed by: EMERGENCY MEDICINE

## 2018-06-07 PROCEDURE — 85025 COMPLETE CBC W/AUTO DIFF WBC: CPT | Performed by: EMERGENCY MEDICINE

## 2018-06-07 PROCEDURE — 40000257 ZZH STATISTIC CONSULT NO CHARGE VASC ACCESS

## 2018-06-07 PROCEDURE — 83690 ASSAY OF LIPASE: CPT | Performed by: EMERGENCY MEDICINE

## 2018-06-07 PROCEDURE — 80053 COMPREHEN METABOLIC PANEL: CPT | Performed by: EMERGENCY MEDICINE

## 2018-06-07 PROCEDURE — 96375 TX/PRO/DX INJ NEW DRUG ADDON: CPT

## 2018-06-07 RX ORDER — LORAZEPAM 2 MG/ML
0.5 INJECTION INTRAMUSCULAR ONCE
Status: COMPLETED | OUTPATIENT
Start: 2018-06-07 | End: 2018-06-07

## 2018-06-07 RX ORDER — ONDANSETRON 2 MG/ML
8 INJECTION INTRAMUSCULAR; INTRAVENOUS ONCE
Status: COMPLETED | OUTPATIENT
Start: 2018-06-07 | End: 2018-06-07

## 2018-06-07 RX ADMIN — LORAZEPAM 0.5 MG: 2 INJECTION INTRAMUSCULAR; INTRAVENOUS at 23:39

## 2018-06-07 RX ADMIN — ONDANSETRON 8 MG: 2 INJECTION INTRAMUSCULAR; INTRAVENOUS at 23:32

## 2018-06-07 NOTE — ED AVS SNAPSHOT
[facility], [place], Emergency Department    [address] [place] [address]    Phone:  [phone]                                       Elaina Lin   MRN: [id number]    Department:  [facility], Emergency Department   Date of Visit:  6/7/2018           After Visit Summary Signature Page     I have received my discharge instructions, and my questions have been answered. I have discussed any challenges I see with this plan with the nurse or doctor.    ..........................................................................................................................................  Patient/Patient Representative Signature      ..........................................................................................................................................  Patient Representative Print Name and Relationship to Patient    ..................................................               ................................................  Date                                            Time    ..........................................................................................................................................  Reviewed by Signature/Title    ...................................................              ..............................................  Date                                                            Time

## 2018-06-07 NOTE — ED AVS SNAPSHOT
[facility], Emergency Department    [address] [address]    Phone:  [phone]                                       Elaina Lin   MRN: [id number]    Department:  [facility], Emergency Department   Date of Visit:  6/7/2018           Patient Information     Date Of Birth          1980        Your diagnoses for this visit were:     Bilateral lower extremity edema with hypoalbuminemia    Constipation, unspecified constipation type        You were seen by Ciaran Pressley MD.        Discharge Instructions       Elevate your legs above your chest as much as possible over the next 48 hours.    Full liquid diet for the next 2 days.  May advance diet to increased protein when you start having bowel movements.    Restart your senna after taking lactulose for 5 days.    Use your Zofran as needed for nausea.    Please make an appointment to follow up with Your Primary Care Provider or our [facility] (phone: [phone]) in one week for recheck.    Discharge References/Attachments     LEG SWELLING IN BOTH LEGS (ENGLISH)    SOURCES OF PROTEIN (ENGLISH)    DIET, FULL LIQUID (ENGLISH)      24 Hour Appointment Hotline       To make an appointment at any [facility] clinic, call [phone] ([phone]). If you don't have a family doctor or clinic, we will help you find one. [facility] clinics are conveniently located to serve the needs of you and your family.             Review of your medicines      START taking        Dose / Directions Last dose taken    lactulose 20 GM/30ML Soln   Dose:  30 mL   Quantity:  450 mL        Take 30 mLs by mouth 3 times daily for 5 days   Refills:  0          Our records show that you are taking the medicines listed below. If these are incorrect, please call your family doctor or clinic.        Dose / Directions Last dose taken    albuterol 108 (90 Base) MCG/ACT Inhaler   Commonly known as:  PROAIR HFA/PROVENTIL HFA/VENTOLIN HFA    Dose:  2 puff   Quantity:  8 g        Inhale 2 puffs into the lungs 4 times daily as needed for other (dyspnea)   Refills:  0        amLODIPine 5 MG tablet   Commonly known as:  NORVASC   Dose:  5 mg   Quantity:  30 tablet        Take 1 tablet (5 mg) by mouth daily   Refills:  0        diphenhydrAMINE 25 MG capsule   Commonly known as:  BENADRYL   Dose:  25-50 mg   Quantity:  56 capsule        Take 1-2 capsules (25-50 mg) by mouth every 6 hours as needed for itching   Refills:  0        divalproex sodium extended-release 250 MG 24 hr tablet   Commonly known as:  DEPAKOTE ER   Dose:  1250 mg   Quantity:  150 tablet        Take 5 tablets (1,250 mg) by mouth At Bedtime   Refills:  0        EPINEPHrine 0.3 MG/0.3ML injection 2-pack   Commonly known as:  EPIPEN/ADRENACLICK/or ANY BX GENERIC EQUIV   Dose:  0.3 mg   Quantity:  0.6 mL        Inject 0.3 mLs (0.3 mg) into the muscle once as needed for anaphylaxis   Refills:  1        ferrous sulfate 325 (65 Fe) MG tablet   Commonly known as:  IRON   Dose:  325 mg   Quantity:  30 tablet        Take 1 tablet (325 mg) by mouth daily (with breakfast)   Refills:  0        methadone 5 MG/5ML solution   Commonly known as:  DOLPHINE   Dose:  40 mg   Indication:  Opioid Dependence        Take 40 mg by mouth daily   Refills:  0        naloxone 1 mg/mL for intranasal kit (2 syringes with 2 mucosal atomizer device)   Commonly known as:  NARCAN   Quantity:  1 kit        In opioid overdose put cone in nostril and push 1/2 of contents into each nostril.  Repeat every 3 min if no response until help arrives.   Refills:  0        ondansetron 4 MG ODT tab   Commonly known as:  ZOFRAN-ODT   Dose:  4 mg   Quantity:  30 tablet        Take 1 tablet (4 mg) by mouth every 6 hours as needed for nausea or vomiting   Refills:  0        QUEtiapine 25 MG tablet   Commonly known as:  SEROquel   Dose:  25 mg   Quantity:  30 tablet        Take 1 tablet (25 mg) by mouth daily   Refills:  0         senna-docusate 8.6-50 MG per tablet   Commonly known as:  SENOKOT-S;PERICOLACE   Dose:  1 tablet   Quantity:  100 tablet        Take 1 tablet by mouth 2 times daily as needed for constipation   Refills:  0                Prescriptions were sent or printed at these locations (1 Prescription)                   Other Prescriptions                Printed at Department/Unit printer (1 of 1)         lactulose 20 GM/30ML SOLN                Procedures and tests performed during your visit     CBC with platelets differential    Chest XR,  PA & LAT    Comprehensive metabolic panel    Lipase    NT proBNP inpatient and ED    Peripheral IV: Standard    Vascular Access Care Adult IP Consult    XR Abdomen 2 Views      Orders Needing Specimen Collection     Ordered          06/07/18 2217  UA reflex to Microscopic and Culture - STAT, [facility]: STAT, Needs to be Collected     Scheduled Task Status   06/07/18 2218 Collect UA reflex to Microscopic and Culture Open   Order Class:  PCU Collect                  Pending Results     Date and Time Order Name Status Description    6/8/2018 0003 XR Abdomen 2 Views Preliminary     6/8/2018 0003 Chest XR,  PA & LAT Preliminary             Pending Culture Results     No orders found for last 3 day(s).            Pending Results Instructions     If you had any lab results that were not finalized at the time of your Discharge, you can call the ED Lab Result RN at [phone]. You will be contacted by this team for any positive Lab results or changes in treatment. The nurses are available 7 days a week from 10A to 6:30P.  You can leave a message 24 hours per day and they will return your call.        Thank you for choosing [facility]       Thank you for choosing [facility] for your care. Our goal is always to provide you with excellent care. Hearing back from our patients is one way we can continue to improve our services. Please take a few minutes to complete the written survey that you may receive in  the mail after you visit with us. Thank you!        [employer] Return Information     MeMeMe gives you secure access to your electronic health record. If you see a primary care provider, you can also send messages to your care team and make appointments. If you have questions, please call your primary care clinic.  If you do not have a primary care provider, please call [phone] and they will assist you.        Care EveryWhere ID     This is your Care EveryWhere ID. This could be used by other organizations to access your [facility] medical records  [id number]        Equal Access to Services     GUZMAN VANN : Pita Cullen, ochoa kaplan, karol webster, mónica chand . So [facility] [phone].    ATENCIÓN: Si habla español, tiene a thompson disposición servicios gratuitos de asistencia lingüística. Llame al [phone].    We comply with applicable federal civil rights laws and [place] laws. We do not discriminate on the basis of race, color, national origin, age, disability, sex, sexual orientation, or gender identity.            After Visit Summary       This is your record. Keep this with you and show to your community pharmacist(s) and doctor(s) at your next visit.

## 2018-06-08 ENCOUNTER — APPOINTMENT (OUTPATIENT)
Dept: GENERAL RADIOLOGY | Facility: CLINIC | Age: 38
End: 2018-06-08
Attending: EMERGENCY MEDICINE
Payer: MEDICAID

## 2018-06-08 VITALS
DIASTOLIC BLOOD PRESSURE: 95 MMHG | WEIGHT: 154 LBS | HEART RATE: 78 BPM | RESPIRATION RATE: 14 BRPM | OXYGEN SATURATION: 97 % | TEMPERATURE: 98 F | SYSTOLIC BLOOD PRESSURE: 128 MMHG | BODY MASS INDEX: 28.34 KG/M2 | HEIGHT: 62 IN

## 2018-06-08 LAB
ALBUMIN SERPL-MCNC: 2.6 G/DL (ref 3.4–5)
ALP SERPL-CCNC: 52 U/L (ref 40–150)
ALT SERPL W P-5'-P-CCNC: 19 U/L (ref 0–50)
ANION GAP SERPL CALCULATED.3IONS-SCNC: 10 MMOL/L (ref 3–14)
AST SERPL W P-5'-P-CCNC: 23 U/L (ref 0–45)
BASOPHILS # BLD AUTO: 0 10E9/L (ref 0–0.2)
BASOPHILS NFR BLD AUTO: 0.2 %
BILIRUB SERPL-MCNC: 0.1 MG/DL (ref 0.2–1.3)
BUN SERPL-MCNC: 6 MG/DL (ref 7–30)
CALCIUM SERPL-MCNC: 8 MG/DL (ref 8.5–10.1)
CHLORIDE SERPL-SCNC: 107 MMOL/L (ref 94–109)
CO2 SERPL-SCNC: 26 MMOL/L (ref 20–32)
CREAT SERPL-MCNC: 0.67 MG/DL (ref 0.52–1.04)
DIFFERENTIAL METHOD BLD: ABNORMAL
EOSINOPHIL # BLD AUTO: 0.1 10E9/L (ref 0–0.7)
EOSINOPHIL NFR BLD AUTO: 1 %
ERYTHROCYTE [DISTWIDTH] IN BLOOD BY AUTOMATED COUNT: 16.8 % (ref 10–15)
GFR SERPL CREATININE-BSD FRML MDRD: >90 ML/MIN/1.7M2
GLUCOSE SERPL-MCNC: 96 MG/DL (ref 70–99)
HCT VFR BLD AUTO: 33.3 % (ref 35–47)
HGB BLD-MCNC: 10.6 G/DL (ref 11.7–15.7)
IMM GRANULOCYTES # BLD: 0 10E9/L (ref 0–0.4)
IMM GRANULOCYTES NFR BLD: 0.1 %
LIPASE SERPL-CCNC: 257 U/L (ref 73–393)
LYMPHOCYTES # BLD AUTO: 2.8 10E9/L (ref 0.8–5.3)
LYMPHOCYTES NFR BLD AUTO: 27.2 %
MCH RBC QN AUTO: 28 PG (ref 26.5–33)
MCHC RBC AUTO-ENTMCNC: 31.8 G/DL (ref 31.5–36.5)
MCV RBC AUTO: 88 FL (ref 78–100)
MONOCYTES # BLD AUTO: 0.8 10E9/L (ref 0–1.3)
MONOCYTES NFR BLD AUTO: 7.8 %
NEUTROPHILS # BLD AUTO: 6.5 10E9/L (ref 1.6–8.3)
NEUTROPHILS NFR BLD AUTO: 63.7 %
NRBC # BLD AUTO: 0 10*3/UL
NRBC BLD AUTO-RTO: 0 /100
NT-PROBNP SERPL-MCNC: 60 PG/ML (ref 0–450)
PLATELET # BLD AUTO: 322 10E9/L (ref 150–450)
POTASSIUM SERPL-SCNC: 3.7 MMOL/L (ref 3.4–5.3)
PROT SERPL-MCNC: 6 G/DL (ref 6.8–8.8)
RBC # BLD AUTO: 3.79 10E12/L (ref 3.8–5.2)
SODIUM SERPL-SCNC: 139 MMOL/L (ref 133–144)
WBC # BLD AUTO: 10.2 10E9/L (ref 4–11)

## 2018-06-08 PROCEDURE — 25000132 ZZH RX MED GY IP 250 OP 250 PS 637: Performed by: EMERGENCY MEDICINE

## 2018-06-08 PROCEDURE — 71046 X-RAY EXAM CHEST 2 VIEWS: CPT

## 2018-06-08 PROCEDURE — 74019 RADEX ABDOMEN 2 VIEWS: CPT

## 2018-06-08 RX ORDER — LACTULOSE 20 G/30ML
30 SOLUTION ORAL 3 TIMES DAILY
Qty: 450 ML | Refills: 0 | Status: SHIPPED | OUTPATIENT
Start: 2018-06-08 | End: 2018-06-13

## 2018-06-08 RX ORDER — LACTULOSE 10 G/15ML
20 SOLUTION ORAL ONCE
Status: COMPLETED | OUTPATIENT
Start: 2018-06-08 | End: 2018-06-08

## 2018-06-08 RX ADMIN — LACTULOSE 20 G: 10 SOLUTION ORAL at 05:55

## 2018-06-08 NOTE — ED NOTES
Writer attempted to discharge patient.  was concerned that patient is too lethargic to discharge. Discussed with ED MD. Patient's  informed that patient would be allowed to sleep in ED until 0600, at which time patient will be discharged with a bus token. Patient's  agreeable with plan. Charge nurse present for discussion.

## 2018-06-08 NOTE — DISCHARGE INSTRUCTIONS
Elevate your legs above your chest as much as possible over the next 48 hours.    Full liquid diet for the next 2 days.  May advance diet to increased protein when you start having bowel movements.    Restart your senna after taking lactulose for 5 days.    Use your Zofran as needed for nausea.    Please make an appointment to follow up with Your Primary Care Provider or our [facility] (phone: [phone]) in one week for recheck.

## 2018-06-08 NOTE — ED TRIAGE NOTES
Presents with bilateral leg pain, swelling that started earlier today. Patient states that her feet are burning bilaterally.

## 2018-06-15 PROCEDURE — 99284 EMERGENCY DEPT VISIT MOD MDM: CPT | Mod: Z6 | Performed by: EMERGENCY MEDICINE

## 2018-06-15 PROCEDURE — 99284 EMERGENCY DEPT VISIT MOD MDM: CPT | Mod: 25 | Performed by: EMERGENCY MEDICINE

## 2018-06-16 ENCOUNTER — HOSPITAL ENCOUNTER (EMERGENCY)
Facility: CLINIC | Age: 38
Discharge: HOME | End: 2018-06-16
Attending: EMERGENCY MEDICINE | Admitting: EMERGENCY MEDICINE
Payer: MEDICAID

## 2018-06-16 VITALS
OXYGEN SATURATION: 96 % | WEIGHT: 154 LBS | SYSTOLIC BLOOD PRESSURE: 109 MMHG | BODY MASS INDEX: 28.17 KG/M2 | DIASTOLIC BLOOD PRESSURE: 62 MMHG | TEMPERATURE: 98.2 F | RESPIRATION RATE: 16 BRPM

## 2018-06-16 DIAGNOSIS — J02.9 VIRAL PHARYNGITIS: ICD-10-CM

## 2018-06-16 DIAGNOSIS — R60.0 EDEMA OF BOTH LEGS: ICD-10-CM

## 2018-06-16 LAB
ALBUMIN SERPL-MCNC: 2.8 G/DL (ref 3.4–5)
ALP SERPL-CCNC: 46 U/L (ref 40–150)
ALT SERPL W P-5'-P-CCNC: 15 U/L (ref 0–50)
ANION GAP SERPL CALCULATED.3IONS-SCNC: 5 MMOL/L (ref 3–14)
AST SERPL W P-5'-P-CCNC: 24 U/L (ref 0–45)
BASOPHILS # BLD AUTO: 0 10E9/L (ref 0–0.2)
BASOPHILS NFR BLD AUTO: 0.4 %
BILIRUB SERPL-MCNC: <0.1 MG/DL (ref 0.2–1.3)
BUN SERPL-MCNC: 12 MG/DL (ref 7–30)
CALCIUM SERPL-MCNC: 8.4 MG/DL (ref 8.5–10.1)
CHLORIDE SERPL-SCNC: 107 MMOL/L (ref 94–109)
CO2 SERPL-SCNC: 33 MMOL/L (ref 20–32)
CREAT SERPL-MCNC: 0.88 MG/DL (ref 0.52–1.04)
DIFFERENTIAL METHOD BLD: ABNORMAL
EOSINOPHIL # BLD AUTO: 0.2 10E9/L (ref 0–0.7)
EOSINOPHIL NFR BLD AUTO: 2 %
ERYTHROCYTE [DISTWIDTH] IN BLOOD BY AUTOMATED COUNT: 16.9 % (ref 10–15)
GFR SERPL CREATININE-BSD FRML MDRD: 72 ML/MIN/1.7M2
GLUCOSE SERPL-MCNC: 92 MG/DL (ref 70–99)
HCT VFR BLD AUTO: 34.6 % (ref 35–47)
HGB BLD-MCNC: 10.7 G/DL (ref 11.7–15.7)
IMM GRANULOCYTES # BLD: 0 10E9/L (ref 0–0.4)
IMM GRANULOCYTES NFR BLD: 0.4 %
LYMPHOCYTES # BLD AUTO: 3 10E9/L (ref 0.8–5.3)
LYMPHOCYTES NFR BLD AUTO: 35.5 %
MCH RBC QN AUTO: 27.9 PG (ref 26.5–33)
MCHC RBC AUTO-ENTMCNC: 30.9 G/DL (ref 31.5–36.5)
MCV RBC AUTO: 90 FL (ref 78–100)
MONOCYTES # BLD AUTO: 0.7 10E9/L (ref 0–1.3)
MONOCYTES NFR BLD AUTO: 8.2 %
NEUTROPHILS # BLD AUTO: 4.6 10E9/L (ref 1.6–8.3)
NEUTROPHILS NFR BLD AUTO: 53.5 %
NRBC # BLD AUTO: 0 10*3/UL
NRBC BLD AUTO-RTO: 0 /100
PLATELET # BLD AUTO: 266 10E9/L (ref 150–450)
POTASSIUM SERPL-SCNC: 3.7 MMOL/L (ref 3.4–5.3)
PROT SERPL-MCNC: 6.6 G/DL (ref 6.8–8.8)
RBC # BLD AUTO: 3.84 10E12/L (ref 3.8–5.2)
SODIUM SERPL-SCNC: 145 MMOL/L (ref 133–144)
WBC # BLD AUTO: 8.5 10E9/L (ref 4–11)

## 2018-06-16 PROCEDURE — 25000132 ZZH RX MED GY IP 250 OP 250 PS 637: Performed by: EMERGENCY MEDICINE

## 2018-06-16 PROCEDURE — 80053 COMPREHEN METABOLIC PANEL: CPT | Performed by: EMERGENCY MEDICINE

## 2018-06-16 PROCEDURE — 96374 THER/PROPH/DIAG INJ IV PUSH: CPT | Performed by: EMERGENCY MEDICINE

## 2018-06-16 PROCEDURE — 85025 COMPLETE CBC W/AUTO DIFF WBC: CPT | Performed by: EMERGENCY MEDICINE

## 2018-06-16 PROCEDURE — 25000125 ZZHC RX 250: Performed by: EMERGENCY MEDICINE

## 2018-06-16 PROCEDURE — 25000128 H RX IP 250 OP 636: Performed by: EMERGENCY MEDICINE

## 2018-06-16 RX ORDER — FUROSEMIDE 20 MG
20 TABLET ORAL DAILY
Qty: 3 TABLET | Refills: 0 | Status: SHIPPED | OUTPATIENT
Start: 2018-06-16 | End: 2020-11-13

## 2018-06-16 RX ORDER — KETOROLAC TROMETHAMINE 30 MG/ML
30 INJECTION, SOLUTION INTRAMUSCULAR; INTRAVENOUS ONCE
Status: COMPLETED | OUTPATIENT
Start: 2018-06-16 | End: 2018-06-16

## 2018-06-16 RX ADMIN — KETOROLAC TROMETHAMINE 30 MG: 30 INJECTION, SOLUTION INTRAMUSCULAR at 02:07

## 2018-06-16 RX ADMIN — LIDOCAINE HYDROCHLORIDE 30 ML: 20 SOLUTION ORAL; TOPICAL at 02:11

## 2018-06-16 ASSESSMENT — ENCOUNTER SYMPTOMS
ABDOMINAL PAIN: 0
CHEST TIGHTNESS: 0
WHEEZING: 0
SORE THROAT: 1
COUGH: 1
RHINORRHEA: 0
SHORTNESS OF BREATH: 0
NAUSEA: 0
VOMITING: 0
TROUBLE SWALLOWING: 0
FEVER: 0

## 2018-06-16 NOTE — ED AVS SNAPSHOT
[facility], Emergency Department    [address]    [facility] [place] [address]    Phone:  [phone]    Fax:  [phone]                                       Elaina Lin   MRN: [id number]    Department:  [facility], Emergency Department   Date of Visit:  6/15/2018           Patient Information     Date Of Birth          1980        Your diagnoses for this visit were:     Viral pharyngitis     Edema of both legs        You were seen by Nancy Mora MD.        Discharge Instructions       Please make an appointment to follow up with [facility] (phone: [phone]) in 2-4 days even if entirely better.  Take the lasix for the swelling in your legs the next 3 days.   Eat 1 extra banana a day for next 3 days.         Discharge References/Attachments     LEG SWELLING IN BOTH LEGS (ENGLISH)    LYMPHEDEMA (ENGLISH)      24 Hour Appointment Hotline       To make an appointment at any [facility], call [phone] ([phone]). If you don't have a family doctor or clinic, we will help you find one. [place] clinics are conveniently located to serve the needs of you and your family.             Review of your medicines      START taking        Dose / Directions Last dose taken    furosemide 20 MG tablet   Commonly known as:  LASIX   Dose:  20 mg   Quantity:  3 tablet        Take 1 tablet (20 mg) by mouth daily for 3 days   Refills:  0          Our records show that you are taking the medicines listed below. If these are incorrect, please call your family doctor or clinic.        Dose / Directions Last dose taken    albuterol 108 (90 Base) MCG/ACT Inhaler   Commonly known as:  PROAIR HFA/PROVENTIL HFA/VENTOLIN HFA   Dose:  2 puff   Quantity:  8 g        Inhale 2 puffs into the lungs 4 times daily as needed for other (dyspnea)   Refills:  0        amLODIPine 5 MG tablet   Commonly known as:  NORVASC   Dose:  5 mg   Quantity:  30 tablet        Take 1 tablet (5 mg)  by mouth daily   Refills:  0        diphenhydrAMINE 25 MG capsule   Commonly known as:  BENADRYL   Dose:  25-50 mg   Quantity:  56 capsule        Take 1-2 capsules (25-50 mg) by mouth every 6 hours as needed for itching   Refills:  0        divalproex sodium extended-release 250 MG 24 hr tablet   Commonly known as:  DEPAKOTE ER   Dose:  1250 mg   Quantity:  150 tablet        Take 5 tablets (1,250 mg) by mouth At Bedtime   Refills:  0        EPINEPHrine 0.3 MG/0.3ML injection 2-pack   Commonly known as:  EPIPEN/ADRENACLICK/or ANY BX GENERIC EQUIV   Dose:  0.3 mg   Quantity:  0.6 mL        Inject 0.3 mLs (0.3 mg) into the muscle once as needed for anaphylaxis   Refills:  1        ferrous sulfate 325 (65 Fe) MG tablet   Commonly known as:  IRON   Dose:  325 mg   Quantity:  30 tablet        Take 1 tablet (325 mg) by mouth daily (with breakfast)   Refills:  0        methadone 5 MG/5ML solution   Commonly known as:  DOLPHINE   Dose:  40 mg   Indication:  Opioid Dependence        Take 40 mg by mouth daily   Refills:  0        naloxone 1 mg/mL for intranasal kit (2 syringes with 2 mucosal atomizer device)   Commonly known as:  NARCAN   Quantity:  1 kit        In opioid overdose put cone in nostril and push 1/2 of contents into each nostril.  Repeat every 3 min if no response until help arrives.   Refills:  0        ondansetron 4 MG ODT tab   Commonly known as:  ZOFRAN-ODT   Dose:  4 mg   Quantity:  30 tablet        Take 1 tablet (4 mg) by mouth every 6 hours as needed for nausea or vomiting   Refills:  0        QUEtiapine 25 MG tablet   Commonly known as:  SEROquel   Dose:  25 mg   Quantity:  30 tablet        Take 1 tablet (25 mg) by mouth daily   Refills:  0        senna-docusate 8.6-50 MG per tablet   Commonly known as:  SENOKOT-S;PERICOLACE   Dose:  1 tablet   Quantity:  100 tablet        Take 1 tablet by mouth 2 times daily as needed for constipation   Refills:  0                Prescriptions were sent or printed at  these locations (1 Prescription)                   Other Prescriptions                Printed at Department/Unit printer (1 of 1)         furosemide (LASIX) 20 MG tablet                Procedures and tests performed during your visit     CBC with platelets differential    Comprehensive metabolic panel      Orders Needing Specimen Collection     Ordered          06/16/18 0120  UA with Microscopic - STAT, [facility]: STAT, Needs to be Collected     Scheduled Task Status   06/16/18 0121 Collect UA with Microscopic Open   Order Class:  PCU Collect                  Pending Results     No orders found from 6/14/2018 to 6/17/2018.            Pending Culture Results     No orders found from 6/14/2018 to 6/17/2018.            Pending Results Instructions     If you had any lab results that were not finalized at the time of your Discharge, you can call the ED Lab Result RN at [phone]. You will be contacted by this team for any positive Lab results or changes in treatment. The nurses are available 7 days a week from 10A to 6:30P.  You can leave a message 24 hours per day and they will return your call.        Thank you for choosing [facility]       Thank you for choosing [facility] for your care. Our goal is always to provide you with excellent care. Hearing back from our patients is one way we can continue to improve our services. Please take a few minutes to complete the written survey that you may receive in the mail after you visit with us. Thank you!        [employer] Information     [employer] gives you secure access to your electronic health record. If you see a primary care provider, you can also send messages to your care team and make appointments. If you have questions, please call your primary care clinic.  If you do not have a primary care provider, please call [phone] and they will assist you.        Care EveryWhere ID     This is your Care EveryWhere ID. This could be used by other organizations to access your  [facility] medical records  [id number]        Equal Access to Services     GUZMAN VANN : Pita Cullen, ochoa kaplan, mónica rosales. So [facility] [phone].    ATENCIÓN: Si habla español, tiene a thompson disposición servicios gratuitos de asistencia lingüística. Llame al [phone].    We comply with applicable federal civil rights laws and [place] laws. We do not discriminate on the basis of race, color, national origin, age, disability, sex, sexual orientation, or gender identity.            After Visit Summary       This is your record. Keep this with you and show to your community pharmacist(s) and doctor(s) at your next visit.

## 2018-06-16 NOTE — LETTER
June 16, 2018      To Whom It May Concern:      Elaina Lin was seen in our Emergency Department today, 06/16/18.  I expect her condition to improve over the next 1 days.  She may return to work/school when improved.    Sincerely,        Nancy Mora MD

## 2018-06-16 NOTE — ED AVS SNAPSHOT
[facility], Emergency Department    [address]    [facility] [address]    Phone:  [phone]    Fax:  [phone]                                       Elaina Lin   MRN: [id number]    Department:  [facility], Emergency Department   Date of Visit:  6/15/2018           After Visit Summary Signature Page     I have received my discharge instructions, and my questions have been answered. I have discussed any challenges I see with this plan with the nurse or doctor.    ..........................................................................................................................................  Patient/Patient Representative Signature      ..........................................................................................................................................  Patient Representative Print Name and Relationship to Patient    ..................................................               ................................................  Date                                            Time    ..........................................................................................................................................  Reviewed by Signature/Title    ...................................................              ..............................................  Date                                                            Time

## 2018-06-16 NOTE — DISCHARGE INSTRUCTIONS
Please make an appointment to follow up with [facility] (phone: [phone]) in 2-4 days even if entirely better.  Take the lasix for the swelling in your legs the next 3 days.   Eat 1 extra banana a day for next 3 days.

## 2018-06-16 NOTE — ED PROVIDER NOTES
History     Chief Complaint   Patient presents with     Pharyngitis     x 2 days     Leg Swelling     bilateral leg swelling x 2 weeks     HPI  Elaina Lin is a 37 year old female with a history of hypertension and substance abuse who presents to the emergency department for evaluation of sore throat, cough, and bilateral lower extremity edema.  The patient reports that she has not noted a sore throat associated with a cough in the past 2 days, but she denies any nasal congestion, rhinorrhea, or fevers.  No shortness of breath or chest pain.  No abdominal pain, nausea, or vomiting.  The patient has also noted bilateral lower extremity edema/pain, particularly worse on her right leg but intermittently waxing and waning, for the past 2 weeks, and the skin on her toes are beginning to peel.  She denies any other edema.  She states that she recently had unprotected sex, so she may be pregnant. She denies any recent missed periods.     PAST MEDICAL HISTORY:   Past Medical History:   Diagnosis Date     Genital herpes 2012     IMO update changed this record. Please review for accuracy     H/O: substance abuse 2013     Postpartum depression 10/21/2011     Recur major depress, severe 2012     Substance abuse        PAST SURGICAL HISTORY:   Past Surgical History:   Procedure Laterality Date      SECTION  2011 and 13     HERNIA REPAIR       REPAIR TENDON HAND         FAMILY HISTORY:   Family History   Problem Relation Age of Onset     Asthma Mother      DIABETES Mother      Allergies Mother      Psychotic Disorder Mother      schitzophrenic     DIABETES Maternal Grandmother      HEART DISEASE Maternal Grandmother      triple bypass     Eye Disorder Maternal Grandmother      cataracts     Hypertension Maternal Grandmother      Unknown/Adopted Maternal Grandfather      Unknown/Adopted Paternal Grandmother      Unknown/Adopted Paternal Grandfather      CANCER Sister      leukemia       SOCIAL  HISTORY:   Social History   Substance Use Topics     Smoking status: Current Every Day Smoker     Packs/day: 1.00     Types: Cigarettes     Smokeless tobacco: Never Used      Comment: about 1 cigarettes per day     Alcohol use No      Comment: 1 liter vodka per day       Patient's Medications   New Prescriptions    No medications on file   Previous Medications    ALBUTEROL (PROAIR HFA/PROVENTIL HFA/VENTOLIN HFA) 108 (90 BASE) MCG/ACT INHALER    Inhale 2 puffs into the lungs 4 times daily as needed for other (dyspnea)    AMLODIPINE (NORVASC) 5 MG TABLET    Take 1 tablet (5 mg) by mouth daily    DIPHENHYDRAMINE (BENADRYL) 25 MG CAPSULE    Take 1-2 capsules (25-50 mg) by mouth every 6 hours as needed for itching    DIVALPROEX SODIUM EXTENDED-RELEASE (DEPAKOTE ER) 250 MG 24 HR TABLET    Take 5 tablets (1,250 mg) by mouth At Bedtime    EPINEPHRINE (EPIPEN/ADRENACLICK/OR ANY BX GENERIC EQUIV) 0.3 MG/0.3ML INJECTION 2-PACK    Inject 0.3 mLs (0.3 mg) into the muscle once as needed for anaphylaxis    FERROUS SULFATE (IRON) 325 (65 FE) MG TABLET    Take 1 tablet (325 mg) by mouth daily (with breakfast)    METHADONE (DOLPHINE) 5 MG/5ML SOLUTION    Take 40 mg by mouth daily    NALOXONE (NARCAN) 1 MG/ML FOR INTRANASAL KIT (2 SYRINGES WITH 2 MUCOSAL ATOMIZER DEVICE)    In opioid overdose put cone in nostril and push 1/2 of contents into each nostril.  Repeat every 3 min if no response until help arrives.    ONDANSETRON (ZOFRAN-ODT) 4 MG ODT TAB    Take 1 tablet (4 mg) by mouth every 6 hours as needed for nausea or vomiting    QUETIAPINE (SEROQUEL) 25 MG TABLET    Take 1 tablet (25 mg) by mouth daily    SENNA-DOCUSATE (SENOKOT-S;PERICOLACE) 8.6-50 MG PER TABLET    Take 1 tablet by mouth 2 times daily as needed for constipation   Modified Medications    No medications on file   Discontinued Medications    No medications on file          Allergies   Allergen Reactions     Tomato Anaphylaxis     Compazine [Prochlorperazine]             I have reviewed the Medications, Allergies, Past Medical and Surgical History, and Social History in the Epic system.    Review of Systems   Constitutional: Negative for fever.   HENT: Positive for sore throat. Negative for congestion, rhinorrhea and trouble swallowing.    Respiratory: Positive for cough. Negative for chest tightness, shortness of breath and wheezing.    Cardiovascular: Positive for leg swelling. Negative for chest pain.   Gastrointestinal: Negative for abdominal pain, nausea and vomiting.   All other systems reviewed and are negative.      Physical Exam   BP: 109/62  Heart Rate: 78  Temp: 98.2  F (36.8  C)  Resp: 16  Weight: 69.9 kg (154 lb)  SpO2: 96 %      Physical Exam   Constitutional: She is oriented to person, place, and time. She appears well-developed and well-nourished.   Sleeping; having to wake up multiple times during exam.  Intermittently dozes off.   HENT:   Head: Normocephalic and atraumatic.   Mouth/Throat: No oropharyngeal exudate.   enlarged taste buds in posterior pharynx.  No exudate.  No tonsillar abscess.  Moist mucous membranes.   Neck: Normal range of motion.   Cardiovascular: Normal rate, regular rhythm and normal heart sounds.    Pulmonary/Chest: Effort normal. No respiratory distress. She has no wheezes. She has rales.   Abdominal: Soft. She exhibits no distension. There is no tenderness. There is no rebound.   Musculoskeletal: She exhibits edema. She exhibits no tenderness.   Bilateral lower extremity edema to mid calf.  About 1+.  Peeling of skin around toes.  No signs of infection.  Mild tenderness in right leg worse than left.   Lymphadenopathy:     She has cervical adenopathy (Anterior cervical lymphadenopathy.).   Neurological: She is alert and oriented to person, place, and time. No cranial nerve deficit. Coordination normal.   Skin: Skin is warm and dry.   Psychiatric: Thought content normal.   Sleepy.       ED Course     ED Course     Procedures              Critical Care time:  none         Results for orders placed or performed during the hospital encounter of 06/16/18   CBC with platelets differential   Result Value Ref Range    WBC 8.5 4.0 - 11.0 10e9/L    RBC Count 3.84 3.8 - 5.2 10e12/L    Hemoglobin 10.7 (L) 11.7 - 15.7 g/dL    Hematocrit 34.6 (L) 35.0 - 47.0 %    MCV 90 78 - 100 fl    MCH 27.9 26.5 - 33.0 pg    MCHC 30.9 (L) 31.5 - 36.5 g/dL    RDW 16.9 (H) 10.0 - 15.0 %    Platelet Count 266 150 - 450 10e9/L    Diff Method Automated Method     % Neutrophils 53.5 %    % Lymphocytes 35.5 %    % Monocytes 8.2 %    % Eosinophils 2.0 %    % Basophils 0.4 %    % Immature Granulocytes 0.4 %    Nucleated RBCs 0 0 /100    Absolute Neutrophil 4.6 1.6 - 8.3 10e9/L    Absolute Lymphocytes 3.0 0.8 - 5.3 10e9/L    Absolute Monocytes 0.7 0.0 - 1.3 10e9/L    Absolute Eosinophils 0.2 0.0 - 0.7 10e9/L    Absolute Basophils 0.0 0.0 - 0.2 10e9/L    Abs Immature Granulocytes 0.0 0 - 0.4 10e9/L    Absolute Nucleated RBC 0.0    Comprehensive metabolic panel   Result Value Ref Range    Sodium 145 (H) 133 - 144 mmol/L    Potassium 3.7 3.4 - 5.3 mmol/L    Chloride 107 94 - 109 mmol/L    Carbon Dioxide 33 (H) 20 - 32 mmol/L    Anion Gap 5 3 - 14 mmol/L    Glucose 92 70 - 99 mg/dL    Urea Nitrogen 12 7 - 30 mg/dL    Creatinine 0.88 0.52 - 1.04 mg/dL    GFR Estimate 72 >60 mL/min/1.7m2    GFR Estimate If Black 87 >60 mL/min/1.7m2    Calcium 8.4 (L) 8.5 - 10.1 mg/dL    Bilirubin Total <0.1 (L) 0.2 - 1.3 mg/dL    Albumin 2.8 (L) 3.4 - 5.0 g/dL    Protein Total 6.6 (L) 6.8 - 8.8 g/dL    Alkaline Phosphatase 46 40 - 150 U/L    ALT 15 0 - 50 U/L    AST 24 0 - 45 U/L     Medications   ketorolac (TORADOL) injection 30 mg (30 mg Intramuscular Given 6/16/18 0207)   lidocaine (viscous) (XYLOCAINE) 2 % 15 mL, alum & mag hydroxide-simethicone (MYLANTA ES/MAALOX  ES) 15 mL GI Cocktail (30 mLs Oral Given 6/16/18 0211)            Labs Ordered and Resulted from Time of ED Arrival Up to the Time of  Departure from the ED - No data to display         Assessments & Plan (with Medical Decision Making)   Patient presented to the ER with complaint of bilateral lower extremity edema.  She says that she has had this for some time.  She says that she has not been elevating her legs as previously prescribed.  Checked her creatinine which is normal.  No signs of liver disease on labs.  Patient's edema is likely dependent edema and recommend elevation of legs.  Will discharge home with a couple days of Lasix.  Patient recommended to take ibuprofen as needed for pain.  In regards to throat sore throat it appears more of a viral pharyngitis.  Patient's main complaint is cough and itchiness of the throat.  Patient will be discharged home with instruction to follow-up with her PCP as needed.  No signs of any acute illness.  Patient remains medically stable.    I have reviewed the nursing notes.    I have reviewed the findings, diagnosis, plan and need for follow up with the patient.    New Prescriptions    No medications on file       Final diagnoses:   Viral pharyngitis   Edema of both legs   I, Afshin Aceves, am serving as a trained medical scribe to document services personally performed by Nancy Mora MD, based on the provider's statements to me.      INancy MD, was physically present and have reviewed and verified the accuracy of this note documented by Afshin Aceves.       6/15/2018   [facility], [place], EMERGENCY DEPARTMENT     Nancy Mora MD  06/16/18 1454

## 2018-06-16 NOTE — ED NOTES
Handoff report to DEMETRIO Domingo.  Informed of course of ED stay and plan of care.  Chayo verbalized understanding.

## 2018-06-16 NOTE — ED NOTES
Verbal orders received from Dr. Mora to administer ketorolac IV (instead of IM) prior to pregnancy test being obtained.

## 2019-01-09 ENCOUNTER — HOSPITAL ENCOUNTER (EMERGENCY)
Facility: CLINIC | Age: 39
Discharge: HOME | End: 2019-01-09
Attending: FAMILY MEDICINE | Admitting: FAMILY MEDICINE
Payer: COMMERCIAL

## 2019-01-09 ENCOUNTER — TELEPHONE (OUTPATIENT)
Dept: BEHAVIORAL HEALTH | Facility: CLINIC | Age: 39
End: 2019-01-09

## 2019-01-09 VITALS
WEIGHT: 152.31 LBS | RESPIRATION RATE: 16 BRPM | SYSTOLIC BLOOD PRESSURE: 140 MMHG | HEART RATE: 93 BPM | BODY MASS INDEX: 27.86 KG/M2 | TEMPERATURE: 97.5 F | OXYGEN SATURATION: 100 % | DIASTOLIC BLOOD PRESSURE: 95 MMHG

## 2019-01-09 DIAGNOSIS — F10.230 ALCOHOL DEPENDENCE WITH UNCOMPLICATED WITHDRAWAL (H): ICD-10-CM

## 2019-01-09 DIAGNOSIS — F14.10 COCAINE ABUSE (H): ICD-10-CM

## 2019-01-09 DIAGNOSIS — F11.20 UNCOMPLICATED OPIOID DEPENDENCE (H): ICD-10-CM

## 2019-01-09 LAB
ALBUMIN SERPL-MCNC: 3.8 G/DL (ref 3.4–5)
ALCOHOL BREATH TEST: 0.1 (ref 0–0.01)
ALP SERPL-CCNC: 78 U/L (ref 40–150)
ALT SERPL W P-5'-P-CCNC: 40 U/L (ref 0–50)
AMPHETAMINES UR QL SCN: NEGATIVE
ANION GAP SERPL CALCULATED.3IONS-SCNC: 14 MMOL/L (ref 3–14)
AST SERPL W P-5'-P-CCNC: NORMAL U/L (ref 0–45)
BARBITURATES UR QL: NEGATIVE
BASOPHILS # BLD AUTO: 0 10E9/L (ref 0–0.2)
BASOPHILS NFR BLD AUTO: 0.1 %
BENZODIAZ UR QL: NEGATIVE
BILIRUB SERPL-MCNC: 0.5 MG/DL (ref 0.2–1.3)
BUN SERPL-MCNC: 13 MG/DL (ref 7–30)
CALCIUM SERPL-MCNC: 8.7 MG/DL (ref 8.5–10.1)
CANNABINOIDS UR QL SCN: NEGATIVE
CHLORIDE SERPL-SCNC: 105 MMOL/L (ref 94–109)
CO2 SERPL-SCNC: 22 MMOL/L (ref 20–32)
COCAINE UR QL: POSITIVE
CREAT SERPL-MCNC: 0.7 MG/DL (ref 0.52–1.04)
DIFFERENTIAL METHOD BLD: ABNORMAL
EOSINOPHIL # BLD AUTO: 0 10E9/L (ref 0–0.7)
EOSINOPHIL NFR BLD AUTO: 0 %
ERYTHROCYTE [DISTWIDTH] IN BLOOD BY AUTOMATED COUNT: 16.6 % (ref 10–15)
ETHANOL UR QL SCN: NEGATIVE
GFR SERPL CREATININE-BSD FRML MDRD: >90 ML/MIN/{1.73_M2}
GLUCOSE SERPL-MCNC: 72 MG/DL (ref 70–99)
HCG UR QL: NEGATIVE
HCT VFR BLD AUTO: 37.3 % (ref 35–47)
HGB BLD-MCNC: 11.3 G/DL (ref 11.7–15.7)
IMM GRANULOCYTES # BLD: 0 10E9/L (ref 0–0.4)
IMM GRANULOCYTES NFR BLD: 0.3 %
LYMPHOCYTES # BLD AUTO: 1.9 10E9/L (ref 0.8–5.3)
LYMPHOCYTES NFR BLD AUTO: 13.8 %
MCH RBC QN AUTO: 24.3 PG (ref 26.5–33)
MCHC RBC AUTO-ENTMCNC: 30.3 G/DL (ref 31.5–36.5)
MCV RBC AUTO: 80 FL (ref 78–100)
MONOCYTES # BLD AUTO: 0.4 10E9/L (ref 0–1.3)
MONOCYTES NFR BLD AUTO: 3.2 %
NEUTROPHILS # BLD AUTO: 11.1 10E9/L (ref 1.6–8.3)
NEUTROPHILS NFR BLD AUTO: 82.6 %
NRBC # BLD AUTO: 0 10*3/UL
NRBC BLD AUTO-RTO: 0 /100
OPIATES UR QL SCN: NEGATIVE
PLATELET # BLD AUTO: 501 10E9/L (ref 150–450)
POTASSIUM SERPL-SCNC: 4.4 MMOL/L (ref 3.4–5.3)
PROT SERPL-MCNC: 8.2 G/DL (ref 6.8–8.8)
RBC # BLD AUTO: 4.65 10E12/L (ref 3.8–5.2)
SODIUM SERPL-SCNC: 141 MMOL/L (ref 133–144)
WBC # BLD AUTO: 13.4 10E9/L (ref 4–11)

## 2019-01-09 PROCEDURE — 80053 COMPREHEN METABOLIC PANEL: CPT | Performed by: FAMILY MEDICINE

## 2019-01-09 PROCEDURE — 25000132 ZZH RX MED GY IP 250 OP 250 PS 637: Performed by: FAMILY MEDICINE

## 2019-01-09 PROCEDURE — 36415 COLL VENOUS BLD VENIPUNCTURE: CPT | Performed by: FAMILY MEDICINE

## 2019-01-09 PROCEDURE — 80320 DRUG SCREEN QUANTALCOHOLS: CPT | Performed by: FAMILY MEDICINE

## 2019-01-09 PROCEDURE — 99283 EMERGENCY DEPT VISIT LOW MDM: CPT | Performed by: FAMILY MEDICINE

## 2019-01-09 PROCEDURE — 81025 URINE PREGNANCY TEST: CPT | Performed by: FAMILY MEDICINE

## 2019-01-09 PROCEDURE — 85025 COMPLETE CBC W/AUTO DIFF WBC: CPT | Performed by: FAMILY MEDICINE

## 2019-01-09 PROCEDURE — 99284 EMERGENCY DEPT VISIT MOD MDM: CPT | Mod: Z6 | Performed by: FAMILY MEDICINE

## 2019-01-09 PROCEDURE — 25000131 ZZH RX MED GY IP 250 OP 636 PS 637: Performed by: FAMILY MEDICINE

## 2019-01-09 PROCEDURE — 80307 DRUG TEST PRSMV CHEM ANLYZR: CPT | Performed by: FAMILY MEDICINE

## 2019-01-09 PROCEDURE — 82075 ASSAY OF BREATH ETHANOL: CPT | Performed by: FAMILY MEDICINE

## 2019-01-09 RX ORDER — ONDANSETRON 8 MG/1
8 TABLET, ORALLY DISINTEGRATING ORAL ONCE
Status: COMPLETED | OUTPATIENT
Start: 2019-01-09 | End: 2019-01-09

## 2019-01-09 RX ORDER — LORAZEPAM 1 MG/1
1 TABLET ORAL ONCE
Status: COMPLETED | OUTPATIENT
Start: 2019-01-09 | End: 2019-01-09

## 2019-01-09 RX ORDER — LANOLIN ALCOHOL/MO/W.PET/CERES
100 CREAM (GRAM) TOPICAL DAILY
Status: DISCONTINUED | OUTPATIENT
Start: 2019-01-09 | End: 2019-01-09 | Stop reason: HOSPADM

## 2019-01-09 RX ORDER — FOLIC ACID 1 MG/1
1 TABLET ORAL DAILY
Status: DISCONTINUED | OUTPATIENT
Start: 2019-01-09 | End: 2019-01-09 | Stop reason: HOSPADM

## 2019-01-09 RX ORDER — AMLODIPINE BESYLATE 5 MG/1
5 TABLET ORAL ONCE
Status: COMPLETED | OUTPATIENT
Start: 2019-01-09 | End: 2019-01-09

## 2019-01-09 RX ORDER — LORAZEPAM 1 MG/1
1-4 TABLET ORAL EVERY 30 MIN PRN
Status: DISCONTINUED | OUTPATIENT
Start: 2019-01-09 | End: 2019-01-09 | Stop reason: HOSPADM

## 2019-01-09 RX ORDER — MULTIPLE VITAMINS W/ MINERALS TAB 9MG-400MCG
1 TAB ORAL DAILY
Status: DISCONTINUED | OUTPATIENT
Start: 2019-01-09 | End: 2019-01-09 | Stop reason: HOSPADM

## 2019-01-09 RX ADMIN — Medication 100 MG: at 15:31

## 2019-01-09 RX ADMIN — FOLIC ACID 1 MG: 1 TABLET ORAL at 15:31

## 2019-01-09 RX ADMIN — MULTIPLE VITAMINS W/ MINERALS TAB 1 TABLET: TAB at 15:31

## 2019-01-09 RX ADMIN — AMLODIPINE BESYLATE 5 MG: 5 TABLET ORAL at 14:51

## 2019-01-09 RX ADMIN — LORAZEPAM 1 MG: 1 TABLET ORAL at 14:51

## 2019-01-09 RX ADMIN — ONDANSETRON 8 MG: 8 TABLET, ORALLY DISINTEGRATING ORAL at 15:35

## 2019-01-09 RX ADMIN — LORAZEPAM 1 MG: 1 TABLET ORAL at 15:31

## 2019-01-09 ASSESSMENT — ENCOUNTER SYMPTOMS: MYALGIAS: 1

## 2019-01-09 NOTE — TELEPHONE ENCOUNTER
"S: Dr Lord gave clinical saying pt was bib her fiance to er seeking detox.  B: pt reports she has been drinking up to 1 liter of etoh per day for the past month. Utox pending. Breath .098. Hx of hypertension and pt says she has not been taking her BP meds. Dr Lord said he will give her BP meds in the er. Hx of seizures, last was 5 years ago. Pt reports she was in a methadone program and then stopped attending. She developed body aches. She said she has had no methadone for past 2 wks. She had been on 105 mg per day per her report. Pt also says she used cocaine last night but says she does not use this \"regularly.\"   A: denies SI, coop, vol, walking, med cleared  R: labs are still pending; gwyn agreed to call intake back when results are back   mbw  Per epic, lab results are in. Author notified Albaro of abnormal labs. Author paged albaro at 3:13 pm. #3awest/mayr cd/dana                 Left message with Margarita at 3:19 pm on 3a to have an RN call back; er informed at 3:20 pm                   mbw      "

## 2019-01-09 NOTE — ED PROVIDER NOTES
"  History     Chief Complaint   Patient presents with     Drug / Alcohol Assessment     Detox from methadone 105 mg per day but has not taken for a litle over a week ago.  Bed on hold(Recent name change since divorce but keeping  name for felicitas/ Natalio)     Alcohol Intoxication     1 liter per day.  Last drink 20 minutes ago.  States she has had seizures in the past.     The history is provided by the patient and medical records.     Elaina Lin is a 38 year old female with a history of opioid abuse, opioid dependence with withdrawal, alcohol abuse, alcohol dependence with withdrawal, alcohol induced acute pancreatitis, hypertension, depression, bipolar 1 disorder, and PTSD, who presents to the Emergency Department requesting detoxification from alcohol and methadone.  The patient reports she drinks approximately 1 L of alcohol per day, and states her last drink was before she presented here at the Emergency Department.  The patient reports she has been off methadone for approximately 2 weeks.  She states she was part of a methadone program taking 105 mg of methadone, however, she has missed clinic and started to go \"cold turkey\".   The patient also reports she smoked cocaine yesterday, 2019.  The patient complains of withdrawal symptoms, including myalgias. She states her last withdrawal seizure was approximately 5 years ago.  The patient states that she is noncompliant to her HTN medications.    Social: The patient is here with her fiance.    I have reviewed the Medications, Allergies, Past Medical and Surgical History, and Social History in the [employer] system.    Past Medical History:   Diagnosis Date     Genital herpes 2012     IMO update changed this record. Please review for accuracy     H/O: substance abuse 2013     Postpartum depression 10/21/2011     Recur major depress, severe 2012     Substance abuse (H)        Past Surgical History:   Procedure Laterality Date      " SECTION  9/6/2011 and 1/26/13     HERNIA REPAIR       REPAIR TENDON HAND         Family History   Problem Relation Age of Onset     Asthma Mother      Diabetes Mother      Allergies Mother      Psychotic Disorder Mother         schitzophrenic     Diabetes Maternal Grandmother      Heart Disease Maternal Grandmother         triple bypass     Eye Disorder Maternal Grandmother         cataracts     Hypertension Maternal Grandmother      Unknown/Adopted Maternal Grandfather      Unknown/Adopted Paternal Grandmother      Unknown/Adopted Paternal Grandfather      Cancer Sister         leukemia       Social History     Tobacco Use     Smoking status: Current Every Day Smoker     Packs/day: 0.50     Types: Cigarettes     Smokeless tobacco: Never Used     Tobacco comment: about 1 cigarettes per day   Substance Use Topics     Alcohol use: Yes     Comment: 1 liter vodka per day       Current Facility-Administered Medications   Medication     amLODIPine (NORVASC) tablet 5 mg     folic acid (FOLVITE) tablet 1 mg     LORazepam (ATIVAN) tablet 1 mg     LORazepam (ATIVAN) tablet 1-4 mg     multivitamin w/minerals (THERA-VIT-M) tablet 1 tablet     vitamin B1 (THIAMINE) tablet 100 mg     Current Outpatient Medications   Medication     albuterol (PROAIR HFA/PROVENTIL HFA/VENTOLIN HFA) 108 (90 Base) MCG/ACT Inhaler     amLODIPine (NORVASC) 5 MG tablet     diphenhydrAMINE (BENADRYL) 25 MG capsule     divalproex sodium extended-release (DEPAKOTE ER) 250 MG 24 hr tablet     EPINEPHrine (EPIPEN/ADRENACLICK/OR ANY BX GENERIC EQUIV) 0.3 MG/0.3ML injection 2-pack     ferrous sulfate (IRON) 325 (65 Fe) MG tablet     furosemide (LASIX) 20 MG tablet     methadone (DOLPHINE) 5 MG/5ML solution     naloxone (NARCAN) 1 mg/mL for intranasal kit (2 syringes with 2 mucosal atomizer device)     ondansetron (ZOFRAN-ODT) 4 MG ODT tab     QUEtiapine (SEROQUEL) 25 MG tablet     senna-docusate (SENOKOT-S;PERICOLACE) 8.6-50 MG per tablet        Allergies    Allergen Reactions     Tomato Anaphylaxis     Compazine [Prochlorperazine]        Review of Systems   Musculoskeletal: Positive for myalgias.   All other systems reviewed and are negative.      Physical Exam   BP: (!) 152/105  Pulse: 95  Temp: 98.2  F (36.8  C)  Resp: 16  Weight: 69.1 kg (152 lb 5 oz)  SpO2: 100 %      Physical Exam   Constitutional: No distress.   HENT:   Head: Atraumatic.   Mouth/Throat: Oropharynx is clear and moist. No oropharyngeal exudate.   Eyes: Pupils are equal, round, and reactive to light. No scleral icterus.   Cardiovascular: Normal heart sounds and intact distal pulses.   Pulmonary/Chest: Breath sounds normal. No respiratory distress.   Abdominal: Soft. Bowel sounds are normal. There is no tenderness.   Musculoskeletal: She exhibits no edema or tenderness.   Skin: Skin is warm. No rash noted. She is not diaphoretic.       ED Course   1:48 PM  The patient was seen and examined by Dr. Scott in Room ED10.     Procedures             Critical Care time:  none             Labs Ordered and Resulted from Time of ED Arrival Up to the Time of Departure from the ED   DRUG ABUSE SCREEN 6 CHEM DEP URINE ([facility]) - Abnormal; Notable for the following components:       Result Value    Cocaine Qual Urine Positive (*)     All other components within normal limits   CBC WITH PLATELETS DIFFERENTIAL - Abnormal; Notable for the following components:    WBC 13.4 (*)     Hemoglobin 11.3 (*)     MCH 24.3 (*)     MCHC 30.3 (*)     RDW 16.6 (*)     Platelet Count 501 (*)     Absolute Neutrophil 11.1 (*)     All other components within normal limits   ALCOHOL BREATH TEST POCT - Abnormal; Notable for the following components:    Alcohol Breath Test 0.098 (*)     All other components within normal limits   HCG QUALITATIVE URINE   COMPREHENSIVE METABOLIC PANEL   MSSA SCORE AND VS   NOTIFY            Assessments & Plan (with Medical Decision Making)   Patient who is chemically dependent presenting seeking detox  "from alcohol, also recently in a methadone program but has not taken methadone for almost 2 weeks now and is experiencing some opiate withdrawal symptoms.  In addition abusing cocaine.  In the ED she is cooperative, medically stable and has mild symptoms of alcohol and opiate withdrawal.  Her labs are unremarkable, hemoglobin stable, WBC and platelets up slightly likely due to recent cocaine use and mild withdrawal.  There is no sign of acute infection, sepsis or other complication of thrombocytosis.  Patient has been unable to stop using drugs and alcohol in the community due to both physical and psychological symptoms.  Continued use will put the patient at risk for medical and/or psychiatric complications.  Patient appears to be an appropriate candidate for voluntary admission to our detox unit.  The patient is otherwise medically and psychiatrically stable and voluntary.    Addendum: I am informed by the nurse that after eating several sandwiches the patient decided to leave with her male significant other.  The nurse followed him to the parking lot and asked when she was leaving and she stated \"I do not want to wait any longer\".  Patient was not on an involuntary hold and did not appear to be acutely intoxicated any longer.    I have reviewed the nursing notes.    I have reviewed the findings, diagnosis, plan and need for follow up with the patient.       Medication List      There are no discharge medications for this visit.         Final diagnoses:   Alcohol dependence with uncomplicated withdrawal (H)   Uncomplicated opioid dependence (H)   Cocaine abuse (H)   I, Tim Monk, am serving as a trained medical scribe to document services personally performed by Cecilio Scott MD, based on the provider's statements to me.      ICecilio MD, was physically present and have reviewed and verified the accuracy of this note documented by Tim Monk.     1/9/2019   [facility], [place], EMERGENCY " DEPARTMENT     Cecilio Scott MD  01/09/19 1520       Cecilio Scott MD  01/09/19 9904

## 2019-01-09 NOTE — ED NOTES
ED to Behavioral Floor Handoff    SITUATION  Elaina iLn is a 38 year old female who speaks English and lives in a home with others The patient arrived in the ED by private car from home with a complaint of Drug / Alcohol Assessment (Detox from methadone 105 mg per day but has not taken for a litle over a week ago.  Bed on hold(Recent name change since divorce but keeping  name for felicitas/ Lance)) and Alcohol Intoxication (1 liter per day.  Last drink 20 minutes ago.  States she has had seizures in the past.)  .The patient's current symptoms started/worsened 1 year(s) ago and during this time the symptoms have increased.   In the ED, pt was diagnosed with   Final diagnoses:   Alcohol dependence with uncomplicated withdrawal (H)   Uncomplicated opioid dependence (H)   Cocaine abuse (H)        Initial vitals were: BP: (!) 152/105  Pulse: 95  Temp: 98.2  F (36.8  C)  Resp: 16  Weight: 69.1 kg (152 lb 5 oz)  SpO2: 100 %   --------  Is the patient diabetic? No   If yes, last blood glucose? --     If yes, was this treated in the ED? --  --------  Is the patient inebriated (ETOH) Yes or Impaired on other substances? Yes  MSSA done? No  Last MSSA score: --    Were withdrawal symptoms treated? No  Does the patient have a seizure history? Yes. If yes, date of most recent seizure--  --------  Is the patient patient experiencing suicidal ideation? denies current or recent suicidal ideation     Homicidal ideation? denies current or recent homicidal ideation or behaviors.    Self-injurious behavior/urges? denies current or recent self injurious behavior or ideation.  ------  Was pt aggressive in the ED No  Was a code called No  Is the pt now cooperative? Yes  -------  Meds given in ED: Medications - No data to display   Family present during ED course? Yes  Family currently present? Yes    BACKGROUND  Does the patient have a cognitive impairment or developmental disability? No  Allergies:   Allergies   Allergen Reactions  "    Tomato Anaphylaxis     Compazine [Prochlorperazine]    .   Social demographics are   Social History     Socioeconomic History     Marital status: Single     Spouse name: None     Number of children: 3     Years of education: None     Highest education level: None   Social Needs     Financial resource strain: None     Food insecurity - worry: None     Food insecurity - inability: None     Transportation needs - medical: None     Transportation needs - non-medical: None   Occupational History     None   Tobacco Use     Smoking status: Current Every Day Smoker     Packs/day: 0.50     Types: Cigarettes     Smokeless tobacco: Never Used     Tobacco comment: about 1 cigarettes per day   Substance and Sexual Activity     Alcohol use: Yes     Comment: 1 liter vodka per day     Drug use: Yes     Types: Other, Opiates, \"Crack\" cocaine, Cocaine     Comment: Last used cocaine yesterday.     Sexual activity: Yes     Partners: Male     Birth control/protection: None   Other Topics Concern     Parent/sibling w/ CABG, MI or angioplasty before 65F 55M? Not Asked      Service No     Blood Transfusions No     Caffeine Concern No     Occupational Exposure No     Hobby Hazards No     Sleep Concern Yes     Comment: having some trouble sleeping - can't fall asleep     Stress Concern Yes     Comment: job/life related     Weight Concern No     Special Diet No     Back Care No     Exercise No     Bike Helmet No     Seat Belt Yes     Self-Exams No   Social History Narrative     None        ASSESSMENT  Labs results   Labs Ordered and Resulted from Time of ED Arrival Up to the Time of Departure from the ED   DRUG ABUSE SCREEN 6 CHEM DEP URINE ([facility]) - Abnormal; Notable for the following components:       Result Value    Cocaine Qual Urine Positive (*)     All other components within normal limits   CBC WITH PLATELETS DIFFERENTIAL - Abnormal; Notable for the following components:    WBC 13.4 (*)     Hemoglobin 11.3 (*)     MCH 24.3 " (*)     MCHC 30.3 (*)     RDW 16.6 (*)     Platelet Count 501 (*)     Absolute Neutrophil 11.1 (*)     All other components within normal limits   ALCOHOL BREATH TEST POCT - Abnormal; Notable for the following components:    Alcohol Breath Test 0.098 (*)     All other components within normal limits   HCG QUALITATIVE URINE   COMPREHENSIVE METABOLIC PANEL      Imaging Studies: No results found for this or any previous visit (from the past 24 hour(s)).   Most recent vital signs BP (!) 152/105   Pulse 95   Temp 98.2  F (36.8  C) (Oral)   Resp 16   Wt 69.1 kg (152 lb 5 oz)   LMP 01/07/2019 (Exact Date)   SpO2 100%   Breastfeeding? No   BMI 27.86 kg/m     Abnormal labs/tests/findings requiring intervention:---   Pain control: pt had none  Nausea control: pt had none    RECOMMENDATION  Are any infection precautions needed (MRSA, VRE, etc.)? No If yes, what infection? --  ---  Does the patient have mobility issues? independently. If yes, what device does the pt use? ---  ---  Is patient on 72 hour hold or commitment? No If on 72 hour hold, have hold and rights been given to patient? No  Are admitting orders written if after 10 p.m. ?No  Tasks needing to be completed:---     Kalli abdul--    [phone] [place] ED   [phone] [facility] ED

## 2019-04-27 ENCOUNTER — HOSPITAL ENCOUNTER (EMERGENCY)
Facility: CLINIC | Age: 39
Discharge: HOME | End: 2019-04-27
Attending: EMERGENCY MEDICINE | Admitting: EMERGENCY MEDICINE
Payer: COMMERCIAL

## 2019-04-27 VITALS
WEIGHT: 145 LBS | RESPIRATION RATE: 15 BRPM | TEMPERATURE: 98.8 F | DIASTOLIC BLOOD PRESSURE: 99 MMHG | HEIGHT: 62 IN | OXYGEN SATURATION: 100 % | HEART RATE: 89 BPM | SYSTOLIC BLOOD PRESSURE: 139 MMHG | BODY MASS INDEX: 26.68 KG/M2

## 2019-04-27 DIAGNOSIS — R10.13 EPIGASTRIC PAIN: ICD-10-CM

## 2019-04-27 DIAGNOSIS — F19.10 POLYSUBSTANCE ABUSE (H): ICD-10-CM

## 2019-04-27 LAB
ALBUMIN SERPL-MCNC: 3.4 G/DL (ref 3.4–5)
ALCOHOL BREATH TEST: 0.18 (ref 0–0.01)
ALP SERPL-CCNC: 72 U/L (ref 40–150)
ALT SERPL W P-5'-P-CCNC: 25 U/L (ref 0–50)
ANION GAP SERPL CALCULATED.3IONS-SCNC: 11 MMOL/L (ref 3–14)
AST SERPL W P-5'-P-CCNC: 42 U/L (ref 0–45)
BASOPHILS # BLD AUTO: 0.1 10E9/L (ref 0–0.2)
BASOPHILS NFR BLD AUTO: 0.4 %
BILIRUB SERPL-MCNC: 0.4 MG/DL (ref 0.2–1.3)
BUN SERPL-MCNC: 12 MG/DL (ref 7–30)
CALCIUM SERPL-MCNC: 8.5 MG/DL (ref 8.5–10.1)
CHLORIDE SERPL-SCNC: 106 MMOL/L (ref 94–109)
CK SERPL-CCNC: 251 U/L (ref 30–225)
CO2 SERPL-SCNC: 26 MMOL/L (ref 20–32)
CREAT SERPL-MCNC: 0.67 MG/DL (ref 0.52–1.04)
DIFFERENTIAL METHOD BLD: ABNORMAL
EOSINOPHIL # BLD AUTO: 0 10E9/L (ref 0–0.7)
EOSINOPHIL NFR BLD AUTO: 0.2 %
ERYTHROCYTE [DISTWIDTH] IN BLOOD BY AUTOMATED COUNT: 20.2 % (ref 10–15)
FERRITIN SERPL-MCNC: 9 NG/ML (ref 12–150)
GFR SERPL CREATININE-BSD FRML MDRD: >90 ML/MIN/{1.73_M2}
GLUCOSE BLDC GLUCOMTR-MCNC: 94 MG/DL (ref 70–99)
GLUCOSE SERPL-MCNC: 66 MG/DL (ref 70–99)
HCT VFR BLD AUTO: 29.5 % (ref 35–47)
HGB BLD-MCNC: 8.8 G/DL (ref 11.7–15.7)
IMM GRANULOCYTES # BLD: 0 10E9/L (ref 0–0.4)
IMM GRANULOCYTES NFR BLD: 0.2 %
IRON SATN MFR SERPL: 4 % (ref 15–46)
IRON SERPL-MCNC: 17 UG/DL (ref 35–180)
LIPASE SERPL-CCNC: 147 U/L (ref 73–393)
LYMPHOCYTES # BLD AUTO: 4 10E9/L (ref 0.8–5.3)
LYMPHOCYTES NFR BLD AUTO: 31.8 %
MCH RBC QN AUTO: 21.4 PG (ref 26.5–33)
MCHC RBC AUTO-ENTMCNC: 29.8 G/DL (ref 31.5–36.5)
MCV RBC AUTO: 72 FL (ref 78–100)
MONOCYTES # BLD AUTO: 1 10E9/L (ref 0–1.3)
MONOCYTES NFR BLD AUTO: 8 %
NEUTROPHILS # BLD AUTO: 7.4 10E9/L (ref 1.6–8.3)
NEUTROPHILS NFR BLD AUTO: 59.4 %
NRBC # BLD AUTO: 0 10*3/UL
NRBC BLD AUTO-RTO: 0 /100
PLATELET # BLD AUTO: 324 10E9/L (ref 150–450)
POTASSIUM SERPL-SCNC: 3.6 MMOL/L (ref 3.4–5.3)
PROT SERPL-MCNC: 7.6 G/DL (ref 6.8–8.8)
RBC # BLD AUTO: 4.11 10E12/L (ref 3.8–5.2)
SODIUM SERPL-SCNC: 143 MMOL/L (ref 133–144)
TIBC SERPL-MCNC: 467 UG/DL (ref 240–430)
TRANSFERRIN SERPL-MCNC: 353 MG/DL (ref 210–360)
WBC # BLD AUTO: 12.5 10E9/L (ref 4–11)

## 2019-04-27 PROCEDURE — 82075 ASSAY OF BREATH ETHANOL: CPT | Performed by: EMERGENCY MEDICINE

## 2019-04-27 PROCEDURE — 83690 ASSAY OF LIPASE: CPT | Performed by: EMERGENCY MEDICINE

## 2019-04-27 PROCEDURE — 93010 ELECTROCARDIOGRAM REPORT: CPT | Mod: Z6 | Performed by: EMERGENCY MEDICINE

## 2019-04-27 PROCEDURE — 25000128 H RX IP 250 OP 636: Performed by: EMERGENCY MEDICINE

## 2019-04-27 PROCEDURE — 85025 COMPLETE CBC W/AUTO DIFF WBC: CPT | Performed by: EMERGENCY MEDICINE

## 2019-04-27 PROCEDURE — 82550 ASSAY OF CK (CPK): CPT | Performed by: EMERGENCY MEDICINE

## 2019-04-27 PROCEDURE — 83550 IRON BINDING TEST: CPT | Performed by: EMERGENCY MEDICINE

## 2019-04-27 PROCEDURE — 99285 EMERGENCY DEPT VISIT HI MDM: CPT | Mod: 25 | Performed by: EMERGENCY MEDICINE

## 2019-04-27 PROCEDURE — 84466 ASSAY OF TRANSFERRIN: CPT | Performed by: EMERGENCY MEDICINE

## 2019-04-27 PROCEDURE — 83540 ASSAY OF IRON: CPT | Performed by: EMERGENCY MEDICINE

## 2019-04-27 PROCEDURE — 00000146 ZZHCL STATISTIC GLUCOSE BY METER IP

## 2019-04-27 PROCEDURE — 80053 COMPREHEN METABOLIC PANEL: CPT | Performed by: EMERGENCY MEDICINE

## 2019-04-27 PROCEDURE — 96372 THER/PROPH/DIAG INJ SC/IM: CPT | Performed by: EMERGENCY MEDICINE

## 2019-04-27 PROCEDURE — 82728 ASSAY OF FERRITIN: CPT | Performed by: EMERGENCY MEDICINE

## 2019-04-27 PROCEDURE — 93005 ELECTROCARDIOGRAM TRACING: CPT | Performed by: EMERGENCY MEDICINE

## 2019-04-27 RX ORDER — HALOPERIDOL 5 MG/ML
10 INJECTION INTRAMUSCULAR ONCE
Status: COMPLETED | OUTPATIENT
Start: 2019-04-27 | End: 2019-04-27

## 2019-04-27 RX ORDER — SODIUM CHLORIDE 9 MG/ML
1000 INJECTION, SOLUTION INTRAVENOUS CONTINUOUS
Status: DISCONTINUED | OUTPATIENT
Start: 2019-04-27 | End: 2019-04-28 | Stop reason: HOSPADM

## 2019-04-27 RX ORDER — HYDROMORPHONE HYDROCHLORIDE 1 MG/ML
0.5 INJECTION, SOLUTION INTRAMUSCULAR; INTRAVENOUS; SUBCUTANEOUS
Status: DISCONTINUED | OUTPATIENT
Start: 2019-04-27 | End: 2019-04-27

## 2019-04-27 RX ORDER — DIPHENHYDRAMINE HYDROCHLORIDE 50 MG/ML
50 INJECTION INTRAMUSCULAR; INTRAVENOUS ONCE
Status: COMPLETED | OUTPATIENT
Start: 2019-04-27 | End: 2019-04-27

## 2019-04-27 RX ORDER — LORAZEPAM 2 MG/ML
1 INJECTION INTRAMUSCULAR ONCE
Status: COMPLETED | OUTPATIENT
Start: 2019-04-27 | End: 2019-04-27

## 2019-04-27 RX ADMIN — HALOPERIDOL LACTATE 10 MG: 5 INJECTION, SOLUTION INTRAMUSCULAR at 07:43

## 2019-04-27 RX ADMIN — DIPHENHYDRAMINE HYDROCHLORIDE 50 MG: 50 INJECTION, SOLUTION INTRAMUSCULAR; INTRAVENOUS at 07:43

## 2019-04-27 RX ADMIN — LORAZEPAM 1 MG: 2 INJECTION INTRAMUSCULAR; INTRAVENOUS at 07:43

## 2019-04-27 RX ADMIN — SODIUM CHLORIDE 1000 ML: 9 INJECTION, SOLUTION INTRAVENOUS at 11:32

## 2019-04-27 ASSESSMENT — MIFFLIN-ST. JEOR: SCORE: 1290.97
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
## 2019-04-27 NOTE — ED AVS SNAPSHOT
[facility], Emergency Department  [address]  [facility] [address]  Phone:  [phone]  Fax:  [phone]                                    Elaina Lin   MRN: [id number]    Department:  [facility], Emergency Department   Date of Visit:  4/27/2019           After Visit Summary Signature Page    I have received my discharge instructions, and my questions have been answered. I have discussed any challenges I see with this plan with the nurse or doctor.    ..........................................................................................................................................  Patient/Patient Representative Signature      ..........................................................................................................................................  Patient Representative Print Name and Relationship to Patient    ..................................................               ................................................  Date                                   Time    ..........................................................................................................................................  Reviewed by Signature/Title    ...................................................              ..............................................  Date                                               Time          22EPIC Rev 08/18

## 2019-04-27 NOTE — ED NOTES
Attempted to draw blood on pt; pt violently rolling over bed and will not hold still. Notified MD unable to draw pt.

## 2019-04-27 NOTE — ED NOTES
Pt is restless, disoriented, pt's partner at the bedside. Unable to draw blood, ALEX and to do EKG test, due to pt's inability to hold steal. Pt was given medication, with help of 4 staff members. Provider was informed.

## 2019-04-28 LAB — INTERPRETATION ECG - MUSE: NORMAL

## 2019-04-28 NOTE — ED NOTES
Pt unable to provide urine sample at this time.  Pt's friend is laying in bed with her, answering questions regarding Pt's state.  Pt is minimally responsive to writer.  Pt awakes and then appears to fall back to sleep.  Pt's friend attempting to find shelter placement.

## 2019-04-28 NOTE — ED NOTES
Pt was discharged by another RN.  No documentation of IV removal, no discharge instructions in chart, no discharge vitals taken.  Charge RN spoke to Pt and SO prior to leaving.  Writer saw Pt leave room with significant other with papers in hand, Pt was discharged off of board by writer.

## 2019-10-02 ENCOUNTER — HEALTH MAINTENANCE LETTER (OUTPATIENT)
Age: 39
End: 2019-10-02

## 2020-07-08 ENCOUNTER — HOSPITAL ENCOUNTER (EMERGENCY)
Facility: CLINIC | Age: 40
Discharge: HOME | End: 2020-07-08
Attending: EMERGENCY MEDICINE | Admitting: EMERGENCY MEDICINE
Payer: COMMERCIAL

## 2020-07-08 VITALS
RESPIRATION RATE: 16 BRPM | HEIGHT: 62 IN | WEIGHT: 130 LBS | BODY MASS INDEX: 23.92 KG/M2 | OXYGEN SATURATION: 99 % | SYSTOLIC BLOOD PRESSURE: 126 MMHG | DIASTOLIC BLOOD PRESSURE: 91 MMHG | HEART RATE: 94 BPM | TEMPERATURE: 98.8 F

## 2020-07-08 DIAGNOSIS — N76.4 ABSCESS OF GENITAL LABIA: ICD-10-CM

## 2020-07-08 PROCEDURE — 25000132 ZZH RX MED GY IP 250 OP 250 PS 637: Performed by: EMERGENCY MEDICINE

## 2020-07-08 PROCEDURE — 99283 EMERGENCY DEPT VISIT LOW MDM: CPT | Mod: 25

## 2020-07-08 PROCEDURE — 25000125 ZZHC RX 250: Performed by: EMERGENCY MEDICINE

## 2020-07-08 PROCEDURE — 56405 I&D VULVA/PERINEAL ABSCESS: CPT

## 2020-07-08 PROCEDURE — 27110038 ZZH RX 271: Performed by: EMERGENCY MEDICINE

## 2020-07-08 RX ORDER — METHYLCELLULOSE 4000CPS 30 %
POWDER (GRAM) MISCELLANEOUS ONCE
Status: COMPLETED | OUTPATIENT
Start: 2020-07-08 | End: 2020-07-08

## 2020-07-08 RX ADMIN — AMOXICILLIN AND CLAVULANATE POTASSIUM 1 TABLET: 875; 125 TABLET, FILM COATED ORAL at 03:31

## 2020-07-08 RX ADMIN — Medication 150 MG: at 01:53

## 2020-07-08 RX ADMIN — Medication: at 01:54

## 2020-07-08 ASSESSMENT — ENCOUNTER SYMPTOMS
MYALGIAS: 0
FEVER: 1
WOUND: 1
SHORTNESS OF BREATH: 0
COUGH: 0

## 2020-07-08 ASSESSMENT — MIFFLIN-ST. JEOR: SCORE: 1217.93

## 2020-07-08 NOTE — ED NOTES
Assisted MD with drainage of cyst in pt's perenial area. Pt was in stirrups, and did not tolerate well however allowed MD to continue. Pt given break at this time for numbing medicine to go into effect.

## 2020-07-08 NOTE — DISCHARGE INSTRUCTIONS
Recommend Tylenol and/or ibuprofen as needed for pain control    Recommend warm water sitz baths multiple times daily    Take antibiotic as prescribed

## 2020-07-08 NOTE — ED AVS SNAPSHOT
Emergency Department  [address] [place] [address]  Phone:  [phone]  Fax:  [phone]                                    Elaina Lin   MRN: [id number]    Department:   Emergency Department   Date of Visit:  7/8/2020           After Visit Summary Signature Page    I have received my discharge instructions, and my questions have been answered. I have discussed any challenges I see with this plan with the nurse or doctor.    ..........................................................................................................................................  Patient/Patient Representative Signature      ..........................................................................................................................................  Patient Representative Print Name and Relationship to Patient    ..................................................               ................................................  Date                                   Time    ..........................................................................................................................................  Reviewed by Signature/Title    ...................................................              ..............................................  Date                                               Time          22EPIC Rev 08/18

## 2020-07-08 NOTE — ED TRIAGE NOTES
"Patient stated that she has \"two boils in my private area\" that have become larger and more painful over the past couple of days.   "

## 2020-07-08 NOTE — ED PROVIDER NOTES
"  History     Chief Complaint:  Wound check      HPI   Elaina Lin is a 39 year old female who presents with two boils in her genital area present for last two days. She has had these in the past and has required drainage. Patient endorses fever up to 100.4 is currently afebrile and has not taken any antipyretics. No cough, shortness of breath, body aches. She denies abnormal vaginal discharge or bleeding. LMP was one week ago and normal timing; denies concern for pregnancy.     Allergies:  Compazine [Prochlorperazine]  Lisinopril     Medications:    Suboxone    Past Medical History:    Genital herpes  Substance abuse  Depression   Hypertension  Opioid use disorder  Alcohol use disorder     Past Surgical History:      Hernia repair  Hand tendon repair      Family History:    Asthma  Diabetes  Allergies  Schizophrenia  Diabetes   Triple bypass  Cataract  Hypertension   Leukemia     Social History:  Smoking status: Current every day  Alcohol use: Yes  Drug use: Yes  Patient presents with significant other.  PCP: No Ref-Primary, Physician    Marital Status:  Single    Review of Systems   Constitutional: Positive for fever.   Respiratory: Negative for cough and shortness of breath.    Genitourinary: Positive for vaginal pain. Negative for vaginal bleeding and vaginal discharge.   Musculoskeletal: Negative for myalgias.   Skin: Positive for wound.   All other systems reviewed and are negative.      Physical Exam     Patient Vitals for the past 24 hrs:   BP Temp Temp src Pulse Heart Rate Resp SpO2 Height Weight   20 0103 (!) 126/91 98.8  F (37.1  C) Temporal 94 98 16 99 % 1.575 m (5' 2\") 59 kg (130 lb)     Physical Exam  General: Patient in mild distress.  Alert and cooperative with exam. Normal mentation.  Nontoxic appearance  HEENT: NC/AT. Conjunctiva without injection or scleral icterus. External ears normal.  Respiratory: Breathing comfortably on room air  CV: Normal rate, all extremities well " perfused  GI:  Non-distended abdomen  : Small nodule without evidence of drainable abscess to the left posterior labia as well as a 2 x 1 cm abscess without surrounding erythema to the right posterior labia majora. No evidence of perirectal abscess.   Skin: Warm, dry, no rashes/open wounds on exposed skin  Musculoskeletal: No obvious deformities  Neuro: Alert, answers questions appropriately. No gross motor deficits     Emergency Department Course     Procedures:    Incision and Drainage     LOCATIONS:  Right posterior labia majora     ANESTHESIA:  LET and Local field block using Marcaine 0.5% plain, total of 2 mLs     PROCEDURE:  Area was incised with # 11 Blade (Sharp Point) with a 1 cm Single Straight incision.  Wound treatment included Purulent Drainage.  Packing consisted of No Packing.  Appropriate dressing was applied to cover the area.    PATIENT STATUS:  Patient tolerated the procedure well. There were no complications.     Interventions:   Medications   amoxicillin-clavulanate (AUGMENTIN) 875-125 MG per tablet 1 tablet (has no administration in time range)   lidocaine/EPINEPHrine/tetracaine (LET) solution KIT ( Topical Given 7/8/20 0154)   methylcellulose powder (150 mg Topical Given 7/8/20 0153)      Emergency Department Course:  Past medical records, nursing notes, and vitals reviewed.  0134: I performed an exam of the patient and obtained history, as documented above. Chaperoned exam of the pelvic region was performed, findings above.     0250: I & D performed as noted above. Findings and plan explained to the Patient. Patient discharged home with instructions regarding supportive care, medications, and reasons to return. The importance of close follow-up was reviewed. The patient was prescribed Augmentin.    Impression & Plan     Medical Decision Making:  Elaina Lin is a 39 year old female who presents with genital pain. She has signs of an abscess. I&D performed and successfully expressed  purulent drainage; see procedure note.  Packing was not placed.  No signs of more severe infection such as necrotizing or lymphangitis.  Wound cares discussed.  Patient did have another small area concerning for developing abscess to her left labia majora; will initiate on antibiotics and recommended sitz baths as well as close follow-up in clinic for wound check in 1-2 days.  Follow up in clinic or may return to ED for wound check if cannot arrange. Warning signs for wound given on discharge instructions and verbally.     Diagnosis:    ICD-10-CM    1. Abscess of genital labia  N76.4      Disposition:  Discharged to home.    Discharge Medications:  New Prescriptions    AMOXICILLIN-CLAVULANATE (AUGMENTIN) 875-125 MG TABLET    Take 1 tablet by mouth 2 times daily for 5 days     Scribe Disclosure:  Leroy TUBBS Chi, am serving as a scribe at 1:32 AM on 7/8/2020 to document services personally performed by Ren Barakat DO based on my observations and the provider's statements to me.      Leroy Holbrook   7/8/2020    EMERGENCY DEPARTMENT     Ren Barakat DO  07/08/20 0566

## 2020-11-11 ENCOUNTER — HOSPITAL ENCOUNTER (EMERGENCY)
Facility: CLINIC | Age: 40
Discharge: LEFT BEFORE DISCHARGE | End: 2020-11-11
Admitting: EMERGENCY MEDICINE
Payer: COMMERCIAL

## 2020-11-11 PROCEDURE — 999N000104 HC STATISTIC NO CHARGE

## 2020-11-13 ENCOUNTER — HOSPITAL ENCOUNTER (EMERGENCY)
Facility: CLINIC | Age: 40
Discharge: PSYCHIATRIC OR SUBSTANCE USE FACILITY | End: 2020-11-14
Attending: EMERGENCY MEDICINE | Admitting: EMERGENCY MEDICINE
Payer: COMMERCIAL

## 2020-11-13 DIAGNOSIS — F10.920 ALCOHOLIC INTOXICATION WITHOUT COMPLICATION (H): ICD-10-CM

## 2020-11-13 DIAGNOSIS — R41.82 ALTERED MENTAL STATUS, UNSPECIFIED ALTERED MENTAL STATUS TYPE: ICD-10-CM

## 2020-11-13 LAB
ALBUMIN SERPL-MCNC: 3.5 G/DL (ref 3.4–5)
ALP SERPL-CCNC: 79 U/L (ref 40–150)
ALT SERPL W P-5'-P-CCNC: 26 U/L (ref 0–50)
AMPHETAMINES UR QL SCN: NEGATIVE
ANION GAP SERPL CALCULATED.3IONS-SCNC: 4 MMOL/L (ref 3–14)
AST SERPL W P-5'-P-CCNC: 28 U/L (ref 0–45)
BARBITURATES UR QL: NEGATIVE
BENZODIAZ UR QL: NEGATIVE
BILIRUB SERPL-MCNC: 0.3 MG/DL (ref 0.2–1.3)
BUN SERPL-MCNC: 13 MG/DL (ref 7–30)
CALCIUM SERPL-MCNC: 8.7 MG/DL (ref 8.5–10.1)
CANNABINOIDS UR QL SCN: NEGATIVE
CHLORIDE SERPL-SCNC: 104 MMOL/L (ref 94–109)
CO2 SERPL-SCNC: 30 MMOL/L (ref 20–32)
COCAINE UR QL: POSITIVE
CREAT SERPL-MCNC: 0.76 MG/DL (ref 0.52–1.04)
ETHANOL SERPL-MCNC: <0.01 G/DL
GFR SERPL CREATININE-BSD FRML MDRD: >90 ML/MIN/{1.73_M2}
GLUCOSE SERPL-MCNC: 134 MG/DL (ref 70–99)
HCG SERPL QL: NEGATIVE
OPIATES UR QL SCN: POSITIVE
PCP UR QL SCN: NEGATIVE
POTASSIUM SERPL-SCNC: 3.6 MMOL/L (ref 3.4–5.3)
PROT SERPL-MCNC: 7.7 G/DL (ref 6.8–8.8)
SODIUM SERPL-SCNC: 138 MMOL/L (ref 133–144)

## 2020-11-13 PROCEDURE — 80053 COMPREHEN METABOLIC PANEL: CPT | Performed by: EMERGENCY MEDICINE

## 2020-11-13 PROCEDURE — 80307 DRUG TEST PRSMV CHEM ANLYZR: CPT | Performed by: EMERGENCY MEDICINE

## 2020-11-13 PROCEDURE — 84703 CHORIONIC GONADOTROPIN ASSAY: CPT | Performed by: EMERGENCY MEDICINE

## 2020-11-13 PROCEDURE — C9803 HOPD COVID-19 SPEC COLLECT: HCPCS

## 2020-11-13 PROCEDURE — 96374 THER/PROPH/DIAG INJ IV PUSH: CPT

## 2020-11-13 PROCEDURE — 80320 DRUG SCREEN QUANTALCOHOLS: CPT | Performed by: EMERGENCY MEDICINE

## 2020-11-13 PROCEDURE — 250N000011 HC RX IP 250 OP 636: Performed by: EMERGENCY MEDICINE

## 2020-11-13 PROCEDURE — 99285 EMERGENCY DEPT VISIT HI MDM: CPT | Mod: 25

## 2020-11-13 PROCEDURE — U0003 INFECTIOUS AGENT DETECTION BY NUCLEIC ACID (DNA OR RNA); SEVERE ACUTE RESPIRATORY SYNDROME CORONAVIRUS 2 (SARS-COV-2) (CORONAVIRUS DISEASE [COVID-19]), AMPLIFIED PROBE TECHNIQUE, MAKING USE OF HIGH THROUGHPUT TECHNOLOGIES AS DESCRIBED BY CMS-2020-01-R: HCPCS | Performed by: EMERGENCY MEDICINE

## 2020-11-13 RX ORDER — NALOXONE HYDROCHLORIDE 0.4 MG/ML
0.4 INJECTION, SOLUTION INTRAMUSCULAR; INTRAVENOUS; SUBCUTANEOUS ONCE
Status: COMPLETED | OUTPATIENT
Start: 2020-11-13 | End: 2020-11-13

## 2020-11-13 RX ADMIN — NALOXONE HYDROCHLORIDE 0.2 MG: 0.4 INJECTION, SOLUTION INTRAMUSCULAR; INTRAVENOUS; SUBCUTANEOUS at 10:50

## 2020-11-13 NOTE — ED NOTES
Bed: ED17  Expected date: 11/13/20  Expected time: 9:38 AM  Means of arrival: Ambulance  Comments:  443 39f agitation ETA 0994

## 2020-11-13 NOTE — ED PROVIDER NOTES
"  History   Chief Complaint:  Agitation    History is limited secondary to chemical restraint. Available history is provided by EMS at bedside.     HPI  Elaina Lin is a 39 year old female, with a history of alcohol abuse, alcohol withdrawal, heroin abuse, hypertension, and depression, who presents to the emergency department for agitation. EMS reports she and her  were praying this morning when she stated her jaw hurt, and then devolved into extreme agitation and unrest. Per EMS, the patient and er  were at a hotel and it seemed as though they were either living there or there for a romantic evening based on the room.  Her  called EMS, who arrived on scene and found her naked, agitated, and screaming intractably. At this point they believed the patient was having what seemed like a panic attack. She would not respond to them in any purposefully way after many attempts to talk with her, thus 5 mg IM Droperidol was administered into her left thigh. Afterwards, the patient was calm, but would only respond to painful stimuli. She was brought to the emergency department in restraints, but they were taken off shortly after. She was found asleep upon entrance of her room.     Allergies:  Compazine  Lisinopril  Amlodipine  Nifedipine  Olanzapine  Prochlorperazine    Medications:    Librium  Albuterol  Lasix  Dolphine  Zofran  Seroquel  Pericolace    Past Medical History:    Genital Herpes  Substance Abuse  Postpartum depression  Depression  Hypertension  Alcohol abuse  Alcohol dependence/withdrawal    Past Surgical History:     section  Hernia repair  Right hand tendon repair    Family History:    Asthma  Diabetes  Schizophrenic  Leukemia    Social History:  Marital status: Single  Smoking status: Yes - 0.5 pack/day  Alcohol use: Yes - 1L/day  Drug use: Yes - Opiates, \"Crack\" cocaine, Cocaine, Amphetamines  PCP: Physician No Ref-Primary    Review of Systems   Unable to perform ROS: Mental " status change     Physical Exam     Patient Vitals for the past 24 hrs:   BP Temp Temp src Pulse Resp SpO2   11/13/20 1315 125/80 -- -- 87 -- 99 %   11/13/20 1300 (!) 129/96 -- -- 91 -- 99 %   11/13/20 1245 126/88 -- -- 82 -- 96 %   11/13/20 1230 115/89 -- -- 80 -- 98 %   11/13/20 1215 116/77 -- -- 77 -- 97 %   11/13/20 1200 113/79 -- -- 75 -- 97 %   11/13/20 1130 110/84 -- -- 83 -- 97 %   11/13/20 1115 117/87 -- -- 87 -- 98 %   11/13/20 1100 103/77 -- -- 75 -- 99 %   11/13/20 1045 110/86 -- -- 76 -- 97 %   11/13/20 1030 109/67 -- -- 83 -- 97 %   11/13/20 1015 117/75 -- -- 84 -- 99 %   11/13/20 1000 111/73 -- -- 83 -- 97 %   11/13/20 0957 111/73 97  F (36.1  C) Temporal 88 20 95 %       Physical Exam  General: Resting on the gurney, somnolent but rouses to verbal stimulation  Head:  The scalp, face, and head appear normal. Pinpoint pupils. No evidence of injury.  Mouth/Throat: Mucus membranes are moist  CV:  Regular rate    Normal S1 and S2  No pathological murmur   Resp:  Breath sounds clear and equal bilaterally    Non-labored, no retractions or accessory muscle use    No coarseness    No wheezing   GI:  Abdomen is soft, no rigidity    No tenderness to palpation  MS:  Normal motor assessment of all extremities.    Good capillary refill noted.    Extremities have no evidence of injury.  Skin:  No rash or lesions noted.  Neuro: Speech is normal and fluent. No apparent deficit. Pinpoint pupils.  Psych:  Awake. Alert.  Normal affect.      Appropriate interactions.    Emergency Department Course   Laboratory:  Laboratory findings were communicated with the patient who voiced understanding of the findings.    Drug abuse screen 77 urine: Pending    HCG Qualitative: Pending    Interventions:  1003: Narcan 0.2 mg IV    Emergency Department Course:  Past medical records, nursing notes, and vitals reviewed.    1005 I performed an exam of the patient as documented above.     IV was inserted and blood was drawn for laboratory  testing, results above.  The patient provided a urine sample here in the emergency department. This was sent for laboratory testing, findings above.    1219 I rechecked the patient and the patient was sleeping comfortably.    1310 I rechecked the patient and the patient was still sleeping and has snoring respirations, but arises to tactile stimuli.    The patient was signed out to Dr. Andino, oncoming ED physician, pending sober re-evaluation.    Impression & Plan    Medical Decision Making:   Elaina Lin is a 39 year old female who was brought to the hospital via EMS after becoming very agitated earlier today.  She was at a hotel with her  and was naked and yelling.  She was not redirectable with EMS therefore she was given droperidol.  While EMS was there she was using all of her extremities with equal strength and though her age was nonsensical it was not slurred.  She does have a history of substance abuse.  Unfortunately she continues to be quite sleepy and will be signed out to my partner, Dr. Andino.  She will be reassessed when medication effects have had time to wear off.  At this time I do not think head CT is indicated as she has no evidence of head injury and had no focal neurologic deficit prior to chemical sedation.    Diagnosis:   Altered mental status    Disposition:   Signed out to Dr. Andino, pending sober re-evaluation.    Scribe Disclosure:  IFlower, am serving as a scribe on 11/13/2020 at 10:05 AM to personally document services performed by Marie Bates MD based on my observations and the provider's statements to me.      Scribe Disclosure:  IRicardo, am serving as a scribe at 10:32 AM on 11/13/2020 to document services personally performed by Marie Bates MD based on my observations and the provider's statements to me.     Marie Bates MD  11/13/20 5930

## 2020-11-13 NOTE — ED NOTES
Opens eyes to gentle shaking and verbal stimuli, unable to maintain eye contact, immediately closes eyes and falls back asleep. Regular nonlabored respirations. VSS

## 2020-11-13 NOTE — ED TRIAGE NOTES
Pt  called EMS from [address]. When medics got there pt was super agitated not answering questions and thrashing on floor. Ptg was restrained and given 5mg IM droperidol. Pt responsive to pain on arrival and restraints removed. Pt resp unlabored.

## 2020-11-13 NOTE — ED AVS SNAPSHOT
[facility] Emergency Dept  [address] [address]  Phone: [phone]  Fax: [phone]                                    Elaina Lin   MRN: [id number]    Department: [facility] Emergency Dept   Date of Visit: 11/13/2020           After Visit Summary Signature Page    I have received my discharge instructions, and my questions have been answered. I have discussed any challenges I see with this plan with the nurse or doctor.    ..........................................................................................................................................  Patient/Patient Representative Signature      ..........................................................................................................................................  Patient Representative Print Name and Relationship to Patient    ..................................................               ................................................  Date                                   Time    ..........................................................................................................................................  Reviewed by Signature/Title    ...................................................              ..............................................  Date                                               Time          22EPIC Rev 08/18

## 2020-11-14 ENCOUNTER — HOSPITAL ENCOUNTER (INPATIENT)
Facility: CLINIC | Age: 40
LOS: 1 days | Discharge: LEFT BEFORE DISCHARGE | DRG: 894 | End: 2020-11-15
Attending: PSYCHIATRY & NEUROLOGY | Admitting: PSYCHIATRY & NEUROLOGY
Payer: COMMERCIAL

## 2020-11-14 VITALS
RESPIRATION RATE: 20 BRPM | DIASTOLIC BLOOD PRESSURE: 86 MMHG | HEART RATE: 97 BPM | SYSTOLIC BLOOD PRESSURE: 119 MMHG | OXYGEN SATURATION: 95 % | TEMPERATURE: 97 F

## 2020-11-14 PROBLEM — F10.939 ALCOHOL WITHDRAWAL (H): Status: ACTIVE | Noted: 2020-11-14

## 2020-11-14 PROBLEM — F10.20 ALCOHOL USE DISORDER, MODERATE, DEPENDENCE (H): Status: ACTIVE | Noted: 2020-11-14

## 2020-11-14 LAB
LABORATORY COMMENT REPORT: NORMAL
SARS-COV-2 RNA SPEC QL NAA+PROBE: NEGATIVE
SARS-COV-2 RNA SPEC QL NAA+PROBE: NORMAL
SPECIMEN SOURCE: NORMAL
SPECIMEN SOURCE: NORMAL

## 2020-11-14 PROCEDURE — 99207 PR CONSULT E&M CHANGED TO SUBSEQUENT LEVEL: CPT | Performed by: PHYSICIAN ASSISTANT

## 2020-11-14 PROCEDURE — 250N000013 HC RX MED GY IP 250 OP 250 PS 637: Performed by: PSYCHIATRY & NEUROLOGY

## 2020-11-14 PROCEDURE — 128N000004 HC R&B CD ADULT

## 2020-11-14 RX ORDER — LURASIDONE HYDROCHLORIDE 20 MG/1
80 TABLET, FILM COATED ORAL
Status: DISCONTINUED | OUTPATIENT
Start: 2020-11-14 | End: 2020-11-14

## 2020-11-14 RX ORDER — AMITRIPTYLINE HYDROCHLORIDE 50 MG/1
50-100 TABLET ORAL
COMMUNITY
End: 2022-01-17

## 2020-11-14 RX ORDER — DIAZEPAM 5 MG
5-20 TABLET ORAL EVERY 30 MIN PRN
Status: DISCONTINUED | OUTPATIENT
Start: 2020-11-14 | End: 2020-11-15 | Stop reason: HOSPADM

## 2020-11-14 RX ORDER — CLONIDINE HYDROCHLORIDE 0.1 MG/1
0.1 TABLET ORAL 2 TIMES DAILY PRN
Status: ON HOLD | COMMUNITY
End: 2020-11-15

## 2020-11-14 RX ORDER — TRAZODONE HYDROCHLORIDE 50 MG/1
50 TABLET, FILM COATED ORAL
Status: DISCONTINUED | OUTPATIENT
Start: 2020-11-14 | End: 2020-11-15 | Stop reason: HOSPADM

## 2020-11-14 RX ORDER — MULTIPLE VITAMINS W/ MINERALS TAB 9MG-400MCG
1 TAB ORAL DAILY
Status: DISCONTINUED | OUTPATIENT
Start: 2020-11-15 | End: 2020-11-15 | Stop reason: HOSPADM

## 2020-11-14 RX ORDER — QUETIAPINE FUMARATE 25 MG/1
25-75 TABLET, FILM COATED ORAL
COMMUNITY
End: 2022-01-17

## 2020-11-14 RX ORDER — QUETIAPINE FUMARATE 25 MG/1
25-75 TABLET, FILM COATED ORAL
Status: DISCONTINUED | OUTPATIENT
Start: 2020-11-14 | End: 2020-11-15 | Stop reason: HOSPADM

## 2020-11-14 RX ORDER — FOLIC ACID 1 MG/1
1 TABLET ORAL DAILY
Status: DISCONTINUED | OUTPATIENT
Start: 2020-11-15 | End: 2020-11-15 | Stop reason: HOSPADM

## 2020-11-14 RX ORDER — ALBUTEROL SULFATE 90 UG/1
2 AEROSOL, METERED RESPIRATORY (INHALATION) 4 TIMES DAILY PRN
Status: DISCONTINUED | OUTPATIENT
Start: 2020-11-14 | End: 2020-11-15 | Stop reason: HOSPADM

## 2020-11-14 RX ORDER — BUSPIRONE HYDROCHLORIDE 10 MG/1
20 TABLET ORAL 3 TIMES DAILY
Status: DISCONTINUED | OUTPATIENT
Start: 2020-11-15 | End: 2020-11-15 | Stop reason: HOSPADM

## 2020-11-14 RX ORDER — ACETAMINOPHEN 325 MG/1
650 TABLET ORAL EVERY 4 HOURS PRN
Status: DISCONTINUED | OUTPATIENT
Start: 2020-11-14 | End: 2020-11-15 | Stop reason: HOSPADM

## 2020-11-14 RX ORDER — BUSPIRONE HYDROCHLORIDE 10 MG/1
20 TABLET ORAL 3 TIMES DAILY
Status: DISCONTINUED | OUTPATIENT
Start: 2020-11-14 | End: 2020-11-14

## 2020-11-14 RX ORDER — BUSPIRONE HYDROCHLORIDE 10 MG/1
20 TABLET ORAL 3 TIMES DAILY
COMMUNITY
End: 2022-01-17

## 2020-11-14 RX ORDER — OLANZAPINE 10 MG/2ML
10 INJECTION, POWDER, FOR SOLUTION INTRAMUSCULAR 3 TIMES DAILY PRN
Status: DISCONTINUED | OUTPATIENT
Start: 2020-11-14 | End: 2020-11-14

## 2020-11-14 RX ORDER — LURASIDONE HYDROCHLORIDE 80 MG/1
80 TABLET, FILM COATED ORAL
Status: DISCONTINUED | OUTPATIENT
Start: 2020-11-15 | End: 2020-11-15 | Stop reason: HOSPADM

## 2020-11-14 RX ORDER — LURASIDONE HYDROCHLORIDE 80 MG/1
80 TABLET, FILM COATED ORAL
COMMUNITY
End: 2022-01-17

## 2020-11-14 RX ORDER — AMOXICILLIN 250 MG
1 CAPSULE ORAL 2 TIMES DAILY PRN
Status: DISCONTINUED | OUTPATIENT
Start: 2020-11-14 | End: 2020-11-15 | Stop reason: HOSPADM

## 2020-11-14 RX ORDER — HYDROXYZINE HYDROCHLORIDE 25 MG/1
25 TABLET, FILM COATED ORAL EVERY 4 HOURS PRN
Status: DISCONTINUED | OUTPATIENT
Start: 2020-11-14 | End: 2020-11-15 | Stop reason: HOSPADM

## 2020-11-14 RX ORDER — TRAZODONE HYDROCHLORIDE 50 MG/1
50 TABLET, FILM COATED ORAL
Status: DISCONTINUED | OUTPATIENT
Start: 2020-11-14 | End: 2020-11-14

## 2020-11-14 RX ORDER — MAGNESIUM HYDROXIDE/ALUMINUM HYDROXICE/SIMETHICONE 120; 1200; 1200 MG/30ML; MG/30ML; MG/30ML
30 SUSPENSION ORAL EVERY 4 HOURS PRN
Status: DISCONTINUED | OUTPATIENT
Start: 2020-11-14 | End: 2020-11-15 | Stop reason: HOSPADM

## 2020-11-14 RX ORDER — EPINEPHRINE 0.3 MG/.3ML
0.3 INJECTION SUBCUTANEOUS
Status: DISCONTINUED | OUTPATIENT
Start: 2020-11-14 | End: 2020-11-14

## 2020-11-14 RX ORDER — LANOLIN ALCOHOL/MO/W.PET/CERES
100 CREAM (GRAM) TOPICAL DAILY
Status: DISCONTINUED | OUTPATIENT
Start: 2020-11-15 | End: 2020-11-15 | Stop reason: HOSPADM

## 2020-11-14 RX ORDER — OLANZAPINE 10 MG/1
10 TABLET ORAL 3 TIMES DAILY PRN
Status: DISCONTINUED | OUTPATIENT
Start: 2020-11-14 | End: 2020-11-14

## 2020-11-14 RX ORDER — BUPRENORPHINE AND NALOXONE 8; 2 MG/1; MG/1
1 FILM, SOLUBLE BUCCAL; SUBLINGUAL 2 TIMES DAILY
COMMUNITY
End: 2021-03-25

## 2020-11-14 RX ORDER — ATENOLOL 50 MG/1
50 TABLET ORAL DAILY PRN
Status: DISCONTINUED | OUTPATIENT
Start: 2020-11-14 | End: 2020-11-14

## 2020-11-14 RX ORDER — BUPRENORPHINE AND NALOXONE 8; 2 MG/1; MG/1
1 FILM, SOLUBLE BUCCAL; SUBLINGUAL 2 TIMES DAILY
Status: DISCONTINUED | OUTPATIENT
Start: 2020-11-14 | End: 2020-11-15 | Stop reason: HOSPADM

## 2020-11-14 RX ADMIN — BUPRENORPHINE AND NALOXONE 1 FILM: 8; 2 FILM, SOLUBLE BUCCAL; SUBLINGUAL at 20:03

## 2020-11-14 ASSESSMENT — ACTIVITIES OF DAILY LIVING (ADL)
DRESSING/BATHING_DIFFICULTY: NO
ORAL_HYGIENE: INDEPENDENT
DRESS: INDEPENDENT
DIFFICULTY_COMMUNICATING: NO
CONCENTRATING,_REMEMBERING_OR_MAKING_DECISIONS_DIFFICULTY: NO
TOILETING_ISSUES: NO
LAUNDRY: WITH SUPERVISION
DIFFICULTY_EATING/SWALLOWING: NO
HYGIENE/GROOMING: INDEPENDENT
ORAL_HYGIENE: INDEPENDENT
HYGIENE/GROOMING: INDEPENDENT
WALKING_OR_CLIMBING_STAIRS_DIFFICULTY: NO
DRESS: STREET CLOTHES

## 2020-11-14 ASSESSMENT — MIFFLIN-ST. JEOR: SCORE: 1354.01

## 2020-11-14 NOTE — PHARMACY-ADMISSION MEDICATION HISTORY
Admission medication history interview status for the 11/14/2020 admission is complete. See Epic admission navigator for allergy information, pharmacy, prior to admission medications and immunization status.     Medication history interview sources:  Patient interview, [facility] ([phone])       Changes made to PTA medication list (reason)  Added: Latuda, buspirone, amitriptyline, clonidine, Suboxone   Deleted: Iron, Depakote, Benadryl  Changed: Quetiapine 25 mg daily > 25-75 mg HS PRN    Additional medication history information (including reliability of information, actions taken by pharmacist):Patient was recently prescribed an erythromycin eye ointment, 1/2 inch strip in both eyes QID on 11/8 with no indication as to duration of therapy. Patient did not mention eye ointment during interview.      Prior to Admission medications    Medication Sig Last Dose Taking? Auth Provider   albuterol (PROAIR HFA/PROVENTIL HFA/VENTOLIN HFA) 108 (90 Base) MCG/ACT Inhaler Inhale 2 puffs into the lungs 4 times daily as needed for other (dyspnea) Past Week Yes Alejandra Iraheta DO   amitriptyline (ELAVIL) 50 MG tablet Take  mg by mouth nightly as needed for sleep Past Week Yes Unknown, Entered By History   buprenorphine HCl-naloxone HCl (SUBOXONE) 8-2 MG per film Place 1 Film under the tongue 2 times daily Past Week Yes Unknown, Entered By History   busPIRone (BUSPAR) 10 MG tablet Take 20 mg by mouth 3 times daily Past Week Yes Unknown, Entered By History   cloNIDine (CATAPRES) 0.1 MG tablet Take 0.1 mg by mouth 2 times daily as needed Past Week Yes Unknown, Entered By History   EPINEPHrine (EPIPEN/ADRENACLICK/OR ANY BX GENERIC EQUIV) 0.3 MG/0.3ML injection 2-pack Inject 0.3 mLs (0.3 mg) into the muscle once as needed for anaphylaxis  Yes Neel Rodriguez MD   lurasidone (LATUDA) 80 MG TABS tablet Take 80 mg by mouth daily with food Past Week Yes Unknown, Entered By History   QUEtiapine (SEROQUEL) 25 MG tablet  Take 25-75 mg by mouth nightly as needed Past Week Yes Unknown, Entered By History         Medication history completed by: Stacey Villarreal, PharmD, [facility]
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
## 2020-11-14 NOTE — PROGRESS NOTES
11/14/20 1339   Patient Belongings   Did you bring any home meds/supplements to the hospital?  No   Patient Belongings  --    Patient Belongings Remaining with Patient clothing  (sweatshirt, underwear)   Belongings Search Yes   Clothing Search Yes   Second Staff Jayla Vick     BIN: underwear, stella with strings, paints with strings, candy    NO WALLET, NO CELL PHONE, NO SHOES    A               Admission:  I am responsible for any personal items that are not sent to the safe or pharmacy.  [facility] is not responsible for loss, theft or damage of any property in my possession.    Signature:  _________________________________ Date: _______  Time: _____                                              Staff Signature:  ____________________________ Date: ________  Time: _____      2nd Staff person, if patient is unable/unwilling to sign:    Signature: ________________________________ Date: ________  Time: _____     Discharge:  [facility] has returned all of my personal belongings:    Signature: _________________________________ Date: ________  Time: _____                                          Staff Signature:  ____________________________ Date: ________  Time: _____

## 2020-11-14 NOTE — ED PROVIDER NOTES
Patient signed out to me with some altered mental status. She has slept all day. Her  states that she had been drinking and I believe this was the cause for her hypersomnolence. Now she is awake and eating. She wants to go home. She is refusing detox. She is on Suboxone but denied taking any other street drugs. She is cleared to be discharged home. She states that she does not go through severe withdrawal.      ICD-10-CM    1. Altered mental status, unspecified altered mental status type  R41.82    2. Alcoholic intoxication without complication (H)  F10.920      Addendum: I had the patient all ready for discharge and then she changed her mind and wanted to go to detox.  There is a bed at [facility] and they have saved it for this patient but they requested testing including a Covid test, comprehensive metabolic panel, alcohol level, and pregnancy test.  I have also ordered a urine drug screen because of her bizarre behavior earlier to see if there was other drug in her system.  I have signed the patient out to Dr. Guan pending results of the testing and then talking to the doctor to get her transferred to [facility] detox.     Kay Andino MD  11/13/20 2120       Kay Andino MD  11/13/20 8605

## 2020-11-14 NOTE — ED NOTES
Pt marcela was here;  He was concerned that pt would not want to go to detox if she saw him, so marcela left.

## 2020-11-14 NOTE — ED NOTES
Spoke to the fiance of pt;  He states that she has been talking about wanting to go to detox and suggested when she is more coherent, to ask her about that.

## 2020-11-14 NOTE — CONSULTS
[facility]  Internal Medicine Consult     Elaina Lin MRN# [id number]   Age: 39 year old YOB: 1980     Date of Admission: 11/14/2020  Date of Consult: 11/15/2020    Primary Care Provider: No Ref-Primary, Physician    Requesting Service: Behavioral Health - Samson Rehman MD  Reason for Consult: General Medical Evaluation      SUBJECTIVE   CC:   HTN   Assessment and Plan/Recommendations:   Elaina Lin is a 39 year old female with history of HTN, depression, anxiety, and polysubstance abuse who was admitted to station 3A with acute etoh withdrawal    Alcohol dependence and polysubstance abuse with acute withdrawal, depression, anxiety, h/o heroin abuse: Presented to ED via EMS with AMS, anxiety, intoxication. Per d/w nursing, patient abusing 1 ounce of crack, 1L vodka, snorting heroin daily. Patient is not willing to meet with writer for formal consult   - Management per psych     H/o HTN: Per chart review, was previously on lasix. Patient not willing to d/w writer at this time. BP stable. Agree with not starting lasix at this time. Contact medicine with questions or concerns or if BP spikes >170/>110      Of note, CBC was not obtained while patient was in the ED. She states she is unwilling to have labs drawn. Writer will cancel CBC but would be happy to re-order if patient becomes more compliant in the future       Patient is not complaint with and will not agree to a full consult at this time. No acute concerned noted on chart review and brief interaction with patient. Medicine will sign off. Please contact if future questions or concerns arise. Thank you for the opportunity to be a part of this patient's care.      Margoth Garcia PA-C  Internal Medicine [facility] Hospitalist  [phone]  November 14, 2020         HPI:   Elaina Lin is a 39 year old female with history of HTN, depression, anxiety, and polysubstance abuse who was admitted to station 3A with acute  etoh withdrawal    Patient is seen as she is walking down the persaud for morning meds. Writer introduces self and patient walks by writer. When writer again asks if patient will meet with writer, patient shakes her head no. Says she will not agree to any lab draws. Unable to obtain any further information from patient        Past Medical History:     Past Medical History:   Diagnosis Date     Genital herpes 2012     IMO update changed this record. Please review for accuracy     H/O: substance abuse (H) 2013     Postpartum depression 10/21/2011     Recur major depress, severe 2012     Substance abuse (H)         Reviewed and updated in [facility].     Past Surgical History:      Past Surgical History:   Procedure Laterality Date      SECTION  2011 and 13     HERNIA REPAIR       REPAIR TENDON HAND           Social History:   Tobacco use: unable to obtain this information   Alcohol use: as above  Elicit drug use: as above     Family History:     Family History   Problem Relation Age of Onset     Asthma Mother      Diabetes Mother      Allergies Mother      Psychotic Disorder Mother         schitzophrenic     Diabetes Maternal Grandmother      Heart Disease Maternal Grandmother         triple bypass     Eye Disorder Maternal Grandmother         cataracts     Hypertension Maternal Grandmother      Unknown/Adopted Maternal Grandfather      Unknown/Adopted Paternal Grandmother      Unknown/Adopted Paternal Grandfather      Cancer Sister         leukemia         Allergies:     Allergies   Allergen Reactions     Tomato Anaphylaxis     Compazine [Prochlorperazine]      Lisinopril Angioedema         Medications:   Reviewed. Please see MAR     Review of Systems:   10 point ROS of systems including Constitutional, Eyes, Respiratory, Cardiovascular, Gastroenterology, Genitourinary, Integumentary, Muscularskeletal, Psychiatric were all negative except for pertinent positives noted in my HPI.    OBJECTIVE    "Physical Exam:   Vitals were reviewed  Blood pressure 122/81, pulse 95, temperature 98.2  F (36.8  C), temperature source Temporal, resp. rate 16, height 1.575 m (5' 2\"), weight 72.6 kg (160 lb), SpO2 100 %, not currently breastfeeding.  General: Alert, walking in the halls. NAD  HEENT: Anicteric sclera  Cardiovascular: Refused   Lungs: Breathing comfortably on room air. Refused ascultation   GI: Refused   Vascular: Refused   Neurologic: No focal deficits, normal gait and station  Skin: No jaundice, rashes, or lesions noted on exposed skin        Data:        Lab Results   Component Value Date     11/13/2020    Lab Results   Component Value Date    CHLORIDE 104 11/13/2020    Lab Results   Component Value Date    BUN 13 11/13/2020      Lab Results   Component Value Date    POTASSIUM 3.6 11/13/2020    Lab Results   Component Value Date    CO2 30 11/13/2020    Lab Results   Component Value Date    CR 0.76 11/13/2020        Lab Results   Component Value Date    WBC 12.5 (H) 04/27/2019    HGB 8.8 (L) 04/27/2019    HCT 29.5 (L) 04/27/2019    MCV 72 (L) 04/27/2019     04/27/2019     Lab Results   Component Value Date    WBC 12.5 (H) 04/27/2019            "

## 2020-11-14 NOTE — PROGRESS NOTES
"Upon admission search with fellow RN, Jayla, patient refused to squat during search and stated, \"This ain't fucking penitentiary.\"  Patient was compliant with the rest of clothing search, search under feet, and in hair. Patient's admission nurse, Cristi ATKINSON, notified.   "

## 2020-11-15 VITALS
SYSTOLIC BLOOD PRESSURE: 127 MMHG | OXYGEN SATURATION: 98 % | HEART RATE: 83 BPM | RESPIRATION RATE: 16 BRPM | BODY MASS INDEX: 29.44 KG/M2 | DIASTOLIC BLOOD PRESSURE: 92 MMHG | WEIGHT: 160 LBS | TEMPERATURE: 97.8 F | HEIGHT: 62 IN

## 2020-11-15 PROCEDURE — 99231 SBSQ HOSP IP/OBS SF/LOW 25: CPT | Performed by: PHYSICIAN ASSISTANT

## 2020-11-15 PROCEDURE — HZ2ZZZZ DETOXIFICATION SERVICES FOR SUBSTANCE ABUSE TREATMENT: ICD-10-PCS | Performed by: PSYCHIATRY & NEUROLOGY

## 2020-11-15 PROCEDURE — 80061 LIPID PANEL: CPT | Performed by: PSYCHIATRY & NEUROLOGY

## 2020-11-15 PROCEDURE — 99223 1ST HOSP IP/OBS HIGH 75: CPT | Mod: AI | Performed by: PSYCHIATRY & NEUROLOGY

## 2020-11-15 PROCEDURE — 250N000013 HC RX MED GY IP 250 OP 250 PS 637: Performed by: PSYCHIATRY & NEUROLOGY

## 2020-11-15 PROCEDURE — 99207 PR APP CREDIT; MD BILLING SHARED VISIT: CPT | Mod: GT | Performed by: NURSE PRACTITIONER

## 2020-11-15 RX ADMIN — BUPRENORPHINE AND NALOXONE 1 FILM: 8; 2 FILM, SOLUBLE BUCCAL; SUBLINGUAL at 08:57

## 2020-11-15 RX ADMIN — BUSPIRONE HYDROCHLORIDE 20 MG: 10 TABLET ORAL at 14:10

## 2020-11-15 RX ADMIN — BUSPIRONE HYDROCHLORIDE 20 MG: 10 TABLET ORAL at 08:57

## 2020-11-15 RX ADMIN — HYDROXYZINE HYDROCHLORIDE 25 MG: 25 TABLET, FILM COATED ORAL at 08:57

## 2020-11-15 RX ADMIN — Medication 100 MG: at 08:57

## 2020-11-15 RX ADMIN — FOLIC ACID 1 MG: 1 TABLET ORAL at 08:57

## 2020-11-15 RX ADMIN — MULTIPLE VITAMINS W/ MINERALS TAB 1 TABLET: TAB at 08:57

## 2020-11-15 ASSESSMENT — ACTIVITIES OF DAILY LIVING (ADL)
DRESS: SCRUBS (BEHAVIORAL HEALTH)
ORAL_HYGIENE: INDEPENDENT
LAUNDRY: WITH SUPERVISION
HYGIENE/GROOMING: INDEPENDENT

## 2020-11-15 NOTE — DISCHARGE INSTRUCTIONS
Behavioral Discharge Planning and Instructions  THANK YOU FOR CHOOSING [address]  [phone]    Summary: You were admitted to Station 3A on 11/14/2020 for detoxification from alcohol.  A medical exam was performed that included lab work. You have met with a  and opted to ***.  Please take care and make your recovery a daily priority, Elaina! It was a pleasure working with you and the entire treatment team here wishes you the very best in your recovery!     Recommendation:  ***    Main Diagnoses:  Per Dr. Samson Rehman;  Bipolar disorder type II-recent episode depressed  Opiate use disorder, severe  Stimulant use disorder, cocaine type, moderate  Alcohol use disorder, severe  PTSD  Hypertension    Major Treatments, Procedures and Findings:  You were treated for alcohol detoxification using valium administered based on the [facility] protocol. You *** a chemical dependency assessment. You had labs drawn and those results were reviewed with you. Please take a copy of your lab work with you to your next primary care physician appointment.    Symptoms to Report:  If you experience more anxiety, confusion, sleeplessness, deep sadness or thoughts of suicide, notify your treatment team or notify your primary care physician. IF ANY OF THE SYMPTOMS YOU ARE EXPERIENCING ARE A MEDICAL EMERGENCY CALL 911 IMMEDIATELY.     Lifestyle Adjustment: Adjust your lifestyle to get enough sleep, relaxation, exercise and good nutrition. Continue to develop healthy coping skills to decrease stress and promote a sober living environment. Do not use mood altering substances including alcohol, illegal drugs or addictive medications other than what is currently prescribed.     Disposition: ***    Facts about COVID19 at www.cdc.gov/COVID19 and www.[place].gov/covid19    Keeping hands clean is one of the most important steps we can take to avoid getting sick and spreading germs to others.  Please wash your hands frequently and  lather with soap for at least 20 seconds!    Follow-up Appointment:   Appointment Date/Time: ***    Psychiatrist/Primary Care Giver: ***    Address: ***    Phone Number: ***      Recovery apps for your phone to locate current in person and zoom recovery meetings  [place] - meeting nolberto  AA  - meeting nolberto  Meeting guide - meeting nolberto  Quick NA meeting - meeting nolberto  Frantz- has various apps    Resources:  *due to covid-19 most AA/NA meetings are being held online*  AA meetings can be found online; search for them at: http://aa-intergroup.org/directory.php  AA meetings via ZOOM for [place] area can be found online at: https://Content Syndicate: Words on Demand.org/find-a-meeting/holiday-closings/  NA meetings via ZOOM for [place] area can be found online at: https://sites.MunchAway.com/view/[place]regionofnarcoticsanonymous/home?authuser=2  AA/NA and Sponsors are excellent resources for support and you can find one at any support group meeting.   Alcoholics Anonymous (www.alcoholics-anonymous.org): for local information 24 hours/day  AA Intergroup service office in [place] (http://www.aastpaul.org/) [phone]  AA Intergroup service office in [facility]: [phone]. (http://www.Content Syndicate: Words on Demand.org/)  Narcotics Anonymous (www.nam[place].org) (537) 385-4258  https://aafairviewriverside.org/meetings  SMART Recovery - self management for addiction recovery:  www.smartrecovery.org  Pathways ~ A Health Crisis Resource & Support Center:  504.358.8584.  https://prescribetoprevent.org/patient-education/videos/  http://www.harmreduction.org  [facility] 100-600-3909  Support Group:  AA/NA and Sponsor/support.  National [place] on Mental Illness (www.[place].leo.org): 848.623.8915 or 569-783-7640.  Alcoholics Anonymous (www.alcoholics-anonymous.org): Check your phone book for your local chapter.  Suicide Awareness Voices of Education (SAVE) (www.save.org): 595-854-IIJX (5842)  National Suicide Prevention Line  (www.mentalhealthmn.org): 029-738-PQLO (3275)  Mental Health Consumer/Survivor Network of [place] (www.mhcsn.net): 459.901.1130 or 602-203-2170  Mental Health Association of [place] (www.mentalhealth.org): 113.693.9079 or 680-043-5044   Substance Abuse and Mental Health Services (www.sama.gov)  [place] Opioid Prevention Coalition: www.opioidcoalition.org    [place] Recovery Connection (MRC)  [facility] connects people seeking recovery to resources that help foster and sustain long-term recovery.  Whether you are seeking resources for treatment, transportation, housing, job training, education, health care or other pathways to recovery, [facility] is a great place to start.  391.197.7760.  www.minnesotarecRadLogicsy.Ringostat Pod casts for nutrition and wellness  Listen on Apple Podcasts  Dishing Up Nutrition   [employer]   Nutrition       Understand the connection between what you eat and how you feel. Hosted by licensed nutritionists and dietitians from [employer] we share practical, real-life solutions for healthier living through nutrition.     General Medication Instructions:   See your medication sheet(s) for instructions.   Take all medications as prescribed.  Make no changes unless your doctor suggests them.   Go to all your doctor visits.  Be sure to have all your required lab tests. This way, your medicines can be refilled on time.  Do not use any forms of alcohol.    Please Note:  If you have any questions at anytime after you are discharged please call [facility] detox unit 3AW at [phone].  [facility] [phone]  Medical Records call [phone]  Outpatient Behavioral Intake call [phone]  LP+ Wait List/Bed Availability call [phone]    Please remember to take all of your behavioral discharge planning and lab paperwork to any follow up appointments, it contains your lab results, diagnosis, medication list and discharge  recommendations.      THANK YOU FOR CHOOSING [facility]

## 2020-11-15 NOTE — PLAN OF CARE
Patient has not required any valium for alcohol detox since transfer to unit 3A from [facility] which is now in excess of 24 hours (she arrived to [facility] approximately 0947 on 11/13/2020). All MSSA scores since arrival to  have been less than 8. Pt is now removed from alcohol detox monitoring status. She remains irritable on approach and assessment bordering on hostile in her interaction with staff.

## 2020-11-15 NOTE — PROGRESS NOTES
"Pt approached nursing station earlier stating adamantly and slightly angrily that she was discharging today, \"I came here voluntary\" and that she is \"ready to go\" and \"I'm leaving, get my stuff\". Informed her that I will call the provider covering to see if she can leave today. Pt was not happy that I had to place a call stating she was leaving regardless stating she already called her boyfriend for a ride. Pt states she will either stay with her boyfriend or return to her sober house \"if they ever answer the phone\". She denies suicidal ideation plans or intent. DOTTY OLIVER and discussed pt desire to discharge. Elizabeth met with pt via TaKaDuom video and AMA discharge was granted. Pt states having all of her medications at home and not needing any medications. She is now discharged.  "

## 2020-11-15 NOTE — PLAN OF CARE
The patient slept throughout the night with no issues reported.     Withdrawal scale scores: MSSA = 3.  No valium given.
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
## 2020-11-15 NOTE — PROGRESS NOTES
11/15/20 1220   Spiritual Beliefs   In support of your spiritual health, is there someone we may contact for you? (identify all that apply)   (shs/11-15)   Visit Information   Visit Made By Staff    Type of Visit Initial;On-call;Staff consultation/triage   Interventions   Plan of Care Review   With interdisciplinary team   SPIRITUAL HEALTH SERVICES  [facility] ([facility] 3A CD  ON-CALL VISIT     REFERRAL SOURCE: Hospital  request.      In consultation with staff, arranged for pt to be seen by unit  on Monday.     PLAN: Will let unit  know of request.       Juan Castillo  Staff   Spiritual Health Services  Pgr: [phone]    * [facility] remains available 24/7 for emergent requests/referrals, either by having the switchboard page the on-call  or by entering an ASAP/STAT consult in Epic (this will also page the on-call ).*

## 2020-11-15 NOTE — PROGRESS NOTES
"Pt has remained in her room throughout the evening. Pt is irritable and often refused to answer questions from writer or do tasks. Writer was unable to complete the rest of admission assessment with pt. Pt was not in the mood to complete interview from this morning.    Pts MSSA scores at 1600 and 2000 were 6 & 7. Pt appears to have minimal withdrawal symptoms.     Pt ate most of her dinner and requested more snacks later in the evening.     Pharmacy spoke with pt over the phone to review PTA medication around 1600, see pharmacy note.  After medications were reviewed, writer called on-call provider for medications orders. Writer expressed concern regarding pts low blood pressure and somnolence. Provider requested that all scheduled medications be held until tomorrow, except for scheduled Suboxone 8-2mg film. Provider placed parameters not to start medication until pt scores 10 or greater on COWS and to call back if BP declined.     Pt COWS scores this evening were 8 at 1600 and 10 at 2000. At 2000 check pt had high irritability, pt appeared restless in bed with restless legs, pts pupils appeared dilated, and pt expressed she was experiencing aches and pains. Due to pts symptoms, improved BP, and improved alertness, Suboxone was administered at 2000.    Pt was moved to a new room this evening, that was closer to the nursing station and provided better visibility of pt.     At 2200, writer enter pt room with vitals machine, pt was awake and refused to get her blood pressure taken. Pt stated, \"im trying to sleep, and you guys keep coming in here.\" Pt also stated, \"let me rest, come back in an hour.\"    Another staff was successful in getting pts VS at 2240. VS were stable see attached VS below.    Continue to monitor pt per MSSA and COWS protocol.     Vitals:    11/14/20 1112 11/14/20 1634 11/14/20 1945 11/14/20 2240   BP: 122/81 97/63 106/72 107/71   BP Location:  Left arm     Pulse: 95 95 83 98   Resp: 16 16     Temp: " "98.2  F (36.8  C) 97.6  F (36.4  C) 97.3  F (36.3  C) 98.3  F (36.8  C)   TempSrc: Temporal Temporal Temporal Temporal   SpO2: 100%  99%    Weight: 72.6 kg (160 lb)      Height: 1.575 m (5' 2\")                  "
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
## 2021-01-15 ENCOUNTER — HEALTH MAINTENANCE LETTER (OUTPATIENT)
Age: 41
End: 2021-01-15

## 2021-03-25 ENCOUNTER — APPOINTMENT (OUTPATIENT)
Dept: GENERAL RADIOLOGY | Facility: CLINIC | Age: 41
End: 2021-03-25
Attending: EMERGENCY MEDICINE
Payer: COMMERCIAL

## 2021-03-25 ENCOUNTER — HOSPITAL ENCOUNTER (INPATIENT)
Facility: CLINIC | Age: 41
LOS: 5 days | Discharge: LEFT BEFORE DISCHARGE | DRG: 885 | End: 2021-03-30
Attending: EMERGENCY MEDICINE | Admitting: PSYCHIATRY & NEUROLOGY
Payer: COMMERCIAL

## 2021-03-25 ENCOUNTER — HOSPITAL ENCOUNTER (EMERGENCY)
Facility: CLINIC | Age: 41
Discharge: HOME | End: 2021-03-25
Attending: EMERGENCY MEDICINE | Admitting: EMERGENCY MEDICINE
Payer: COMMERCIAL

## 2021-03-25 ENCOUNTER — TELEPHONE (OUTPATIENT)
Dept: BEHAVIORAL HEALTH | Facility: CLINIC | Age: 41
End: 2021-03-25

## 2021-03-25 ENCOUNTER — APPOINTMENT (OUTPATIENT)
Dept: CT IMAGING | Facility: CLINIC | Age: 41
End: 2021-03-25
Attending: EMERGENCY MEDICINE
Payer: COMMERCIAL

## 2021-03-25 VITALS
BODY MASS INDEX: 29.26 KG/M2 | SYSTOLIC BLOOD PRESSURE: 161 MMHG | RESPIRATION RATE: 16 BRPM | HEIGHT: 62 IN | HEART RATE: 100 BPM | DIASTOLIC BLOOD PRESSURE: 108 MMHG | TEMPERATURE: 99.1 F | OXYGEN SATURATION: 98 %

## 2021-03-25 VITALS
TEMPERATURE: 97.3 F | DIASTOLIC BLOOD PRESSURE: 92 MMHG | HEART RATE: 113 BPM | OXYGEN SATURATION: 98 % | SYSTOLIC BLOOD PRESSURE: 133 MMHG

## 2021-03-25 DIAGNOSIS — F10.20 UNCOMPLICATED ALCOHOL DEPENDENCE (H): ICD-10-CM

## 2021-03-25 DIAGNOSIS — R45.851 SUICIDAL IDEATION: ICD-10-CM

## 2021-03-25 DIAGNOSIS — F11.93 OPIOID WITHDRAWAL (H): ICD-10-CM

## 2021-03-25 DIAGNOSIS — F19.20 CHEMICAL DEPENDENCY (H): ICD-10-CM

## 2021-03-25 DIAGNOSIS — F31.81 BIPOLAR II DISORDER (H): ICD-10-CM

## 2021-03-25 DIAGNOSIS — F33.1 MAJOR DEPRESSIVE DISORDER, RECURRENT EPISODE, MODERATE (H): ICD-10-CM

## 2021-03-25 DIAGNOSIS — Z20.822 CONTACT WITH AND (SUSPECTED) EXPOSURE TO COVID-19: ICD-10-CM

## 2021-03-25 DIAGNOSIS — F43.10 POSTTRAUMATIC STRESS DISORDER: ICD-10-CM

## 2021-03-25 DIAGNOSIS — F10.920 ALCOHOLIC INTOXICATION WITHOUT COMPLICATION (H): ICD-10-CM

## 2021-03-25 DIAGNOSIS — F41.1 GENERALIZED ANXIETY DISORDER: ICD-10-CM

## 2021-03-25 DIAGNOSIS — S09.90XA CLOSED HEAD INJURY, INITIAL ENCOUNTER: ICD-10-CM

## 2021-03-25 LAB
ALCOHOL BREATH TEST: 0.05 (ref 0–0.01)
AMPHETAMINES UR QL SCN: POSITIVE
BARBITURATES UR QL: NEGATIVE
BENZODIAZ UR QL: NEGATIVE
CANNABINOIDS UR QL SCN: NEGATIVE
COCAINE UR QL: POSITIVE
ETHANOL UR QL SCN: POSITIVE
HCG UR QL: NEGATIVE
LABORATORY COMMENT REPORT: NORMAL
OPIATES UR QL SCN: NEGATIVE
SARS-COV-2 RNA RESP QL NAA+PROBE: NEGATIVE
SPECIMEN SOURCE: NORMAL

## 2021-03-25 PROCEDURE — 250N000013 HC RX MED GY IP 250 OP 250 PS 637: Performed by: PSYCHIATRY & NEUROLOGY

## 2021-03-25 PROCEDURE — 99285 EMERGENCY DEPT VISIT HI MDM: CPT | Mod: 25 | Performed by: EMERGENCY MEDICINE

## 2021-03-25 PROCEDURE — 99285 EMERGENCY DEPT VISIT HI MDM: CPT | Performed by: EMERGENCY MEDICINE

## 2021-03-25 PROCEDURE — 99282 EMERGENCY DEPT VISIT SF MDM: CPT | Performed by: EMERGENCY MEDICINE

## 2021-03-25 PROCEDURE — 73130 X-RAY EXAM OF HAND: CPT | Mod: LT

## 2021-03-25 PROCEDURE — 99285 EMERGENCY DEPT VISIT HI MDM: CPT | Mod: 25

## 2021-03-25 PROCEDURE — 81025 URINE PREGNANCY TEST: CPT | Performed by: PSYCHIATRY & NEUROLOGY

## 2021-03-25 PROCEDURE — 82075 ASSAY OF BREATH ETHANOL: CPT | Performed by: EMERGENCY MEDICINE

## 2021-03-25 PROCEDURE — 70450 CT HEAD/BRAIN W/O DYE: CPT

## 2021-03-25 PROCEDURE — C9803 HOPD COVID-19 SPEC COLLECT: HCPCS | Performed by: EMERGENCY MEDICINE

## 2021-03-25 PROCEDURE — 90791 PSYCH DIAGNOSTIC EVALUATION: CPT

## 2021-03-25 PROCEDURE — 80307 DRUG TEST PRSMV CHEM ANLYZR: CPT | Performed by: EMERGENCY MEDICINE

## 2021-03-25 PROCEDURE — 250N000013 HC RX MED GY IP 250 OP 250 PS 637: Performed by: EMERGENCY MEDICINE

## 2021-03-25 PROCEDURE — 99283 EMERGENCY DEPT VISIT LOW MDM: CPT | Performed by: EMERGENCY MEDICINE

## 2021-03-25 PROCEDURE — 80320 DRUG SCREEN QUANTALCOHOLS: CPT | Performed by: EMERGENCY MEDICINE

## 2021-03-25 PROCEDURE — 87635 SARS-COV-2 COVID-19 AMP PRB: CPT | Performed by: EMERGENCY MEDICINE

## 2021-03-25 PROCEDURE — 124N000002 HC R&B MH UMMC

## 2021-03-25 RX ORDER — AMOXICILLIN 250 MG
1 CAPSULE ORAL 2 TIMES DAILY PRN
Status: DISCONTINUED | OUTPATIENT
Start: 2021-03-25 | End: 2021-03-29

## 2021-03-25 RX ORDER — BUPRENORPHINE AND NALOXONE 8; 2 MG/1; MG/1
1 FILM, SOLUBLE BUCCAL; SUBLINGUAL ONCE
Status: COMPLETED | OUTPATIENT
Start: 2021-03-25 | End: 2021-03-25

## 2021-03-25 RX ORDER — ACETAMINOPHEN 325 MG/1
650 TABLET ORAL ONCE
Status: COMPLETED | OUTPATIENT
Start: 2021-03-25 | End: 2021-03-25

## 2021-03-25 RX ORDER — ALBUTEROL SULFATE 90 UG/1
2 AEROSOL, METERED RESPIRATORY (INHALATION) 4 TIMES DAILY PRN
Status: DISCONTINUED | OUTPATIENT
Start: 2021-03-25 | End: 2021-03-30 | Stop reason: HOSPADM

## 2021-03-25 RX ORDER — NICOTINE 21 MG/24HR
1 PATCH, TRANSDERMAL 24 HOURS TRANSDERMAL DAILY
Status: DISCONTINUED | OUTPATIENT
Start: 2021-03-25 | End: 2021-03-30 | Stop reason: HOSPADM

## 2021-03-25 RX ORDER — CLONIDINE HYDROCHLORIDE 0.1 MG/1
0.1 TABLET ORAL 2 TIMES DAILY
COMMUNITY
End: 2022-01-17

## 2021-03-25 RX ORDER — TRAZODONE HYDROCHLORIDE 50 MG/1
50 TABLET, FILM COATED ORAL
Status: DISCONTINUED | OUTPATIENT
Start: 2021-03-25 | End: 2021-03-30 | Stop reason: HOSPADM

## 2021-03-25 RX ORDER — OLANZAPINE 10 MG/1
10 TABLET ORAL 3 TIMES DAILY PRN
Status: DISCONTINUED | OUTPATIENT
Start: 2021-03-25 | End: 2021-03-30 | Stop reason: HOSPADM

## 2021-03-25 RX ORDER — AMITRIPTYLINE HYDROCHLORIDE 50 MG/1
50 TABLET ORAL
Status: DISCONTINUED | OUTPATIENT
Start: 2021-03-25 | End: 2021-03-30 | Stop reason: HOSPADM

## 2021-03-25 RX ORDER — LURASIDONE HYDROCHLORIDE 40 MG/1
40 TABLET, FILM COATED ORAL
Status: DISCONTINUED | OUTPATIENT
Start: 2021-03-25 | End: 2021-03-26

## 2021-03-25 RX ORDER — BUPRENORPHINE HYDROCHLORIDE AND NALOXONE HYDROCHLORIDE DIHYDRATE 8; 2 MG/1; MG/1
2 TABLET SUBLINGUAL DAILY
COMMUNITY
End: 2022-01-17

## 2021-03-25 RX ORDER — ACETAMINOPHEN 325 MG/1
650 TABLET ORAL EVERY 4 HOURS PRN
Status: DISCONTINUED | OUTPATIENT
Start: 2021-03-25 | End: 2021-03-30 | Stop reason: HOSPADM

## 2021-03-25 RX ORDER — LANOLIN ALCOHOL/MO/W.PET/CERES
100 CREAM (GRAM) TOPICAL DAILY
Status: DISCONTINUED | OUTPATIENT
Start: 2021-03-25 | End: 2021-03-30 | Stop reason: HOSPADM

## 2021-03-25 RX ORDER — OLANZAPINE 10 MG/2ML
10 INJECTION, POWDER, FOR SOLUTION INTRAMUSCULAR 3 TIMES DAILY PRN
Status: DISCONTINUED | OUTPATIENT
Start: 2021-03-25 | End: 2021-03-30 | Stop reason: HOSPADM

## 2021-03-25 RX ORDER — HYDROXYZINE HYDROCHLORIDE 25 MG/1
25 TABLET, FILM COATED ORAL EVERY 4 HOURS PRN
Status: DISCONTINUED | OUTPATIENT
Start: 2021-03-25 | End: 2021-03-30 | Stop reason: HOSPADM

## 2021-03-25 RX ORDER — QUETIAPINE FUMARATE 25 MG/1
25-75 TABLET, FILM COATED ORAL
Status: DISCONTINUED | OUTPATIENT
Start: 2021-03-25 | End: 2021-03-30 | Stop reason: HOSPADM

## 2021-03-25 RX ORDER — MULTIPLE VITAMINS W/ MINERALS TAB 9MG-400MCG
1 TAB ORAL DAILY
Status: DISCONTINUED | OUTPATIENT
Start: 2021-03-25 | End: 2021-03-30 | Stop reason: HOSPADM

## 2021-03-25 RX ORDER — BUPRENORPHINE AND NALOXONE 8; 2 MG/1; MG/1
1 FILM, SOLUBLE BUCCAL; SUBLINGUAL 2 TIMES DAILY
Status: DISCONTINUED | OUTPATIENT
Start: 2021-03-26 | End: 2021-03-26

## 2021-03-25 RX ORDER — LORAZEPAM 1 MG/1
1-4 TABLET ORAL EVERY 30 MIN PRN
Status: DISCONTINUED | OUTPATIENT
Start: 2021-03-25 | End: 2021-03-29

## 2021-03-25 RX ORDER — MAGNESIUM HYDROXIDE/ALUMINUM HYDROXICE/SIMETHICONE 120; 1200; 1200 MG/30ML; MG/30ML; MG/30ML
30 SUSPENSION ORAL EVERY 4 HOURS PRN
Status: DISCONTINUED | OUTPATIENT
Start: 2021-03-25 | End: 2021-03-30 | Stop reason: HOSPADM

## 2021-03-25 RX ORDER — FOLIC ACID 1 MG/1
1 TABLET ORAL DAILY
Status: DISCONTINUED | OUTPATIENT
Start: 2021-03-25 | End: 2021-03-30 | Stop reason: HOSPADM

## 2021-03-25 RX ADMIN — ACETAMINOPHEN 650 MG: 325 TABLET ORAL at 09:34

## 2021-03-25 RX ADMIN — LURASIDONE HYDROCHLORIDE 40 MG: 40 TABLET, FILM COATED ORAL at 19:10

## 2021-03-25 RX ADMIN — BUSPIRONE HYDROCHLORIDE 20 MG: 5 TABLET ORAL at 19:09

## 2021-03-25 RX ADMIN — MULTIPLE VITAMINS W/ MINERALS TAB 1 TABLET: TAB at 19:09

## 2021-03-25 RX ADMIN — ACETAMINOPHEN 650 MG: 325 TABLET, FILM COATED ORAL at 21:21

## 2021-03-25 RX ADMIN — FOLIC ACID 1 MG: 1 TABLET ORAL at 19:10

## 2021-03-25 RX ADMIN — AMITRIPTYLINE HYDROCHLORIDE 50 MG: 50 TABLET, FILM COATED ORAL at 21:28

## 2021-03-25 RX ADMIN — BUPRENORPHINE AND NALOXONE 1 FILM: 8; 2 FILM, SOLUBLE BUCCAL; SUBLINGUAL at 11:13

## 2021-03-25 RX ADMIN — BUPRENORPHINE AND NALOXONE 1 FILM: 8; 2 FILM, SOLUBLE BUCCAL; SUBLINGUAL at 19:10

## 2021-03-25 RX ADMIN — ACETAMINOPHEN 650 MG: 325 TABLET, FILM COATED ORAL at 17:55

## 2021-03-25 RX ADMIN — THIAMINE HCL TAB 100 MG 100 MG: 100 TAB at 19:09

## 2021-03-25 RX ADMIN — NICOTINE 1 PATCH: 14 PATCH, EXTENDED RELEASE TRANSDERMAL at 19:10

## 2021-03-25 RX ADMIN — QUETIAPINE FUMARATE 50 MG: 25 TABLET ORAL at 21:31

## 2021-03-25 ASSESSMENT — ENCOUNTER SYMPTOMS
COLOR CHANGE: 0
EYE REDNESS: 0
ARTHRALGIAS: 0
LIGHT-HEADEDNESS: 0
ABDOMINAL PAIN: 0
VOMITING: 0
CONFUSION: 0
COLOR CHANGE: 0
ADENOPATHY: 0
MYALGIAS: 1
CONFUSION: 0
NAUSEA: 0
HEADACHES: 1
HEADACHES: 0
COUGH: 0
EYE REDNESS: 0
WOUND: 1
ABDOMINAL PAIN: 0
APPETITE CHANGE: 1
POLYDIPSIA: 0
NAUSEA: 1
SHORTNESS OF BREATH: 0
NECK STIFFNESS: 0
DIFFICULTY URINATING: 0
FEVER: 0
SHORTNESS OF BREATH: 0
FEVER: 0
NECK STIFFNESS: 0
BRUISES/BLEEDS EASILY: 0
NERVOUS/ANXIOUS: 0
WEAKNESS: 0
FEVER: 0
VOMITING: 0
NECK PAIN: 0
CHILLS: 1
CHILLS: 0
DIFFICULTY URINATING: 0
ARTHRALGIAS: 0

## 2021-03-25 ASSESSMENT — ACTIVITIES OF DAILY LIVING (ADL)
LAUNDRY: WITH SUPERVISION
ORAL_HYGIENE: INDEPENDENT
DRESS: INDEPENDENT
HYGIENE/GROOMING: INDEPENDENT

## 2021-03-25 ASSESSMENT — MIFFLIN-ST. JEOR: SCORE: 1371.69
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
## 2021-03-25 NOTE — ED NOTES
Patient notified writer that she spoke to the staff at her previous treatment and that she was told she needed to go to a residential treatment before going back there. Patient also explained she called her rule 25  who told her that she would need to be reassessed here and should be sent to tapistry program. Raza the mental health  was updated.
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
## 2021-03-25 NOTE — ED NOTES
Due to patient's suicidal ideation, feelings of hopelessness, limited support system, and ongoing substance abuse, and severe level of impairment due to depression the disposition plan has changed to inpatient behavioral health services for safety and stabilization. The patient will be going to station 30 at [place].
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
## 2021-03-25 NOTE — PHARMACY-ADMISSION MEDICATION HISTORY
Admission Medication History Completed by Pharmacy    See [facility] Admission Navigator for allergy information, preferred outpatient pharmacy, prior to admission medications and immunization status.     Medication History Sources:     Patient, Care Everywhere, [facility] ([phone]), [facility]     Changes made to PTA medication list (reason):    Added:   o Clonidine 0.1 mg tablet- take 1 tablet po 2x per day, per [facility]/patient     Deleted: None    Changed:   o Buprenorphine HCl-naloxone HCl 8-2 mg per film: place 1 film under the tongue 2x daily--> Buprenorphine-Naloxone 8-2 mg SL tablet: Place 2 tablets under the tongue daily. Per patient/[facility]    Additional Information:    Patient reported that she goes by a last name of Natalio. Was only able to verify her medications from [facility] and [facility] with a last name of Natalio.     Patient last filled most of her medications in January according to [facility] but she reports to have taken her medications in the past week.     Per ED notes, patient has been in long-term the past month so unclear if she was getting her medications in long-term. Patient reports that she was getting her medications though.     Prior to Admission medications    Medication Sig Last Dose Taking? Auth Provider   amitriptyline (ELAVIL) 50 MG tablet Take  mg by mouth nightly as needed for sleep Past Week at Unknown time Yes Unknown, Entered By History   buprenorphine-naloxone (SUBOXONE) 8-2 MG SUBL sublingual tablet Place 2 tablets under the tongue daily  Yes Reported, Patient   busPIRone (BUSPAR) 10 MG tablet Take 20 mg by mouth 3 times daily Past Week at Unknown time Yes Unknown, Entered By History   cloNIDine (CATAPRES) 0.1 MG tablet Take 0.1 mg by mouth 2 times daily Past Week at Unknown time Yes Reported, Patient   lurasidone (LATUDA) 80 MG TABS tablet Take 80 mg by mouth daily with food Past Week at Unknown time Yes Unknown, Entered By History   QUEtiapine  (SEROQUEL) 25 MG tablet Take 25-75 mg by mouth nightly as needed Past Week at Unknown time Yes Unknown, Entered By History   albuterol (PROAIR HFA/PROVENTIL HFA/VENTOLIN HFA) 108 (90 Base) MCG/ACT Inhaler Inhale 2 puffs into the lungs 4 times daily as needed for other (dyspnea) Unknown at Unknown time  Alejandra Iraheta, DO   EPINEPHrine (EPIPEN/ADRENACLICK/OR ANY BX GENERIC EQUIV) 0.3 MG/0.3ML injection 2-pack Inject 0.3 mLs (0.3 mg) into the muscle once as needed for anaphylaxis Unknown at Unknown time  Neel Rodriguez MD       Date completed: 03/25/21    Medication history completed by: Марина Castaneda
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
## 2021-03-26 LAB
ALBUMIN SERPL-MCNC: 2.9 G/DL (ref 3.4–5)
ALP SERPL-CCNC: 80 U/L (ref 40–150)
ALT SERPL W P-5'-P-CCNC: 51 U/L (ref 0–50)
ANION GAP SERPL CALCULATED.3IONS-SCNC: 7 MMOL/L (ref 3–14)
AST SERPL W P-5'-P-CCNC: 83 U/L (ref 0–45)
BASOPHILS # BLD AUTO: 0 10E9/L (ref 0–0.2)
BASOPHILS NFR BLD AUTO: 0.3 %
BILIRUB SERPL-MCNC: 0.3 MG/DL (ref 0.2–1.3)
BUN SERPL-MCNC: 12 MG/DL (ref 7–30)
CALCIUM SERPL-MCNC: 8.4 MG/DL (ref 8.5–10.1)
CHLORIDE SERPL-SCNC: 102 MMOL/L (ref 94–109)
CHOLEST SERPL-MCNC: 159 MG/DL
CO2 SERPL-SCNC: 30 MMOL/L (ref 20–32)
CREAT SERPL-MCNC: 0.74 MG/DL (ref 0.52–1.04)
DIFFERENTIAL METHOD BLD: ABNORMAL
EOSINOPHIL # BLD AUTO: 0 10E9/L (ref 0–0.7)
EOSINOPHIL NFR BLD AUTO: 0.5 %
ERYTHROCYTE [DISTWIDTH] IN BLOOD BY AUTOMATED COUNT: 21.3 % (ref 10–15)
GFR SERPL CREATININE-BSD FRML MDRD: >90 ML/MIN/{1.73_M2}
GLUCOSE SERPL-MCNC: 92 MG/DL (ref 70–99)
HCT VFR BLD AUTO: 30.4 % (ref 35–47)
HDLC SERPL-MCNC: 56 MG/DL
HGB BLD-MCNC: 9.6 G/DL (ref 11.7–15.7)
IMM GRANULOCYTES # BLD: 0 10E9/L (ref 0–0.4)
IMM GRANULOCYTES NFR BLD: 0.2 %
LDLC SERPL CALC-MCNC: 86 MG/DL
LYMPHOCYTES # BLD AUTO: 2.4 10E9/L (ref 0.8–5.3)
LYMPHOCYTES NFR BLD AUTO: 37.5 %
MCH RBC QN AUTO: 25.1 PG (ref 26.5–33)
MCHC RBC AUTO-ENTMCNC: 31.6 G/DL (ref 31.5–36.5)
MCV RBC AUTO: 80 FL (ref 78–100)
MONOCYTES # BLD AUTO: 0.4 10E9/L (ref 0–1.3)
MONOCYTES NFR BLD AUTO: 6.6 %
NEUTROPHILS # BLD AUTO: 3.5 10E9/L (ref 1.6–8.3)
NEUTROPHILS NFR BLD AUTO: 54.9 %
NONHDLC SERPL-MCNC: 103 MG/DL
NRBC # BLD AUTO: 0 10*3/UL
NRBC BLD AUTO-RTO: 0 /100
PLATELET # BLD AUTO: 322 10E9/L (ref 150–450)
POTASSIUM SERPL-SCNC: 3.4 MMOL/L (ref 3.4–5.3)
PROT SERPL-MCNC: 6.6 G/DL (ref 6.8–8.8)
RBC # BLD AUTO: 3.82 10E12/L (ref 3.8–5.2)
SODIUM SERPL-SCNC: 139 MMOL/L (ref 133–144)
TRIGL SERPL-MCNC: 87 MG/DL
TSH SERPL DL<=0.005 MIU/L-ACNC: 2.89 MU/L (ref 0.4–4)
WBC # BLD AUTO: 6.4 10E9/L (ref 4–11)

## 2021-03-26 PROCEDURE — 85025 COMPLETE CBC W/AUTO DIFF WBC: CPT | Performed by: PSYCHIATRY & NEUROLOGY

## 2021-03-26 PROCEDURE — 124N000002 HC R&B MH UMMC

## 2021-03-26 PROCEDURE — 99223 1ST HOSP IP/OBS HIGH 75: CPT | Mod: AI | Performed by: PSYCHIATRY & NEUROLOGY

## 2021-03-26 PROCEDURE — 84443 ASSAY THYROID STIM HORMONE: CPT | Performed by: PSYCHIATRY & NEUROLOGY

## 2021-03-26 PROCEDURE — G0177 OPPS/PHP; TRAIN & EDUC SERV: HCPCS

## 2021-03-26 PROCEDURE — 80061 LIPID PANEL: CPT | Performed by: PSYCHIATRY & NEUROLOGY

## 2021-03-26 PROCEDURE — 80053 COMPREHEN METABOLIC PANEL: CPT | Performed by: PSYCHIATRY & NEUROLOGY

## 2021-03-26 PROCEDURE — HZ2ZZZZ DETOXIFICATION SERVICES FOR SUBSTANCE ABUSE TREATMENT: ICD-10-PCS | Performed by: PSYCHIATRY & NEUROLOGY

## 2021-03-26 PROCEDURE — 250N000013 HC RX MED GY IP 250 OP 250 PS 637: Performed by: PSYCHIATRY & NEUROLOGY

## 2021-03-26 PROCEDURE — 36415 COLL VENOUS BLD VENIPUNCTURE: CPT | Performed by: PSYCHIATRY & NEUROLOGY

## 2021-03-26 RX ORDER — LOPERAMIDE HCL 2 MG
2 CAPSULE ORAL 4 TIMES DAILY PRN
Status: DISCONTINUED | OUTPATIENT
Start: 2021-03-26 | End: 2021-03-30 | Stop reason: HOSPADM

## 2021-03-26 RX ORDER — LURASIDONE HYDROCHLORIDE 40 MG/1
80 TABLET, FILM COATED ORAL
Status: DISCONTINUED | OUTPATIENT
Start: 2021-03-26 | End: 2021-03-26

## 2021-03-26 RX ORDER — BUPRENORPHINE AND NALOXONE 8; 2 MG/1; MG/1
1 FILM, SOLUBLE BUCCAL; SUBLINGUAL DAILY
Status: DISCONTINUED | OUTPATIENT
Start: 2021-03-27 | End: 2021-03-30 | Stop reason: HOSPADM

## 2021-03-26 RX ORDER — ONDANSETRON 4 MG/1
4 TABLET, ORALLY DISINTEGRATING ORAL EVERY 6 HOURS PRN
Status: DISCONTINUED | OUTPATIENT
Start: 2021-03-26 | End: 2021-03-30 | Stop reason: HOSPADM

## 2021-03-26 RX ORDER — CLONIDINE HYDROCHLORIDE 0.1 MG/1
0.1 TABLET ORAL 2 TIMES DAILY
Status: DISCONTINUED | OUTPATIENT
Start: 2021-03-26 | End: 2021-03-30 | Stop reason: HOSPADM

## 2021-03-26 RX ORDER — NAPROXEN 250 MG/1
250 TABLET ORAL 2 TIMES DAILY PRN
Status: DISCONTINUED | OUTPATIENT
Start: 2021-03-26 | End: 2021-03-29

## 2021-03-26 RX ORDER — LURASIDONE HYDROCHLORIDE 40 MG/1
80 TABLET, FILM COATED ORAL
Status: COMPLETED | OUTPATIENT
Start: 2021-03-26 | End: 2021-03-26

## 2021-03-26 RX ORDER — BUPRENORPHINE AND NALOXONE 2; .5 MG/1; MG/1
1 FILM, SOLUBLE BUCCAL; SUBLINGUAL DAILY
Status: DISCONTINUED | OUTPATIENT
Start: 2021-03-26 | End: 2021-03-29

## 2021-03-26 RX ADMIN — LORAZEPAM 1 MG: 1 TABLET ORAL at 01:27

## 2021-03-26 RX ADMIN — LURASIDONE HYDROCHLORIDE 80 MG: 40 TABLET, FILM COATED ORAL at 16:08

## 2021-03-26 RX ADMIN — BUPRENORPHINE AND NALOXONE 1 FILM: 8; 2 FILM, SOLUBLE BUCCAL; SUBLINGUAL at 08:31

## 2021-03-26 RX ADMIN — NICOTINE 1 PATCH: 14 PATCH, EXTENDED RELEASE TRANSDERMAL at 08:30

## 2021-03-26 RX ADMIN — LORAZEPAM 1 MG: 1 TABLET ORAL at 19:09

## 2021-03-26 RX ADMIN — BUSPIRONE HYDROCHLORIDE 20 MG: 5 TABLET ORAL at 08:30

## 2021-03-26 RX ADMIN — ACETAMINOPHEN 650 MG: 325 TABLET, FILM COATED ORAL at 16:08

## 2021-03-26 RX ADMIN — BUPRENORPHINE AND NALOXONE 1 FILM: 2; .5 FILM, SOLUBLE BUCCAL; SUBLINGUAL at 16:08

## 2021-03-26 RX ADMIN — QUETIAPINE FUMARATE 75 MG: 25 TABLET ORAL at 20:02

## 2021-03-26 RX ADMIN — CLONIDINE HYDROCHLORIDE 0.1 MG: 0.1 TABLET ORAL at 12:39

## 2021-03-26 RX ADMIN — MULTIPLE VITAMINS W/ MINERALS TAB 1 TABLET: TAB at 08:30

## 2021-03-26 RX ADMIN — NAPROXEN 250 MG: 250 TABLET ORAL at 16:34

## 2021-03-26 RX ADMIN — THIAMINE HCL TAB 100 MG 100 MG: 100 TAB at 08:31

## 2021-03-26 RX ADMIN — BUSPIRONE HYDROCHLORIDE 20 MG: 5 TABLET ORAL at 19:02

## 2021-03-26 RX ADMIN — AMITRIPTYLINE HYDROCHLORIDE 50 MG: 50 TABLET, FILM COATED ORAL at 20:02

## 2021-03-26 RX ADMIN — LORAZEPAM 1 MG: 1 TABLET ORAL at 12:22

## 2021-03-26 RX ADMIN — BUSPIRONE HYDROCHLORIDE 20 MG: 5 TABLET ORAL at 14:06

## 2021-03-26 RX ADMIN — LORAZEPAM 1 MG: 1 TABLET ORAL at 08:30

## 2021-03-26 RX ADMIN — CLONIDINE HYDROCHLORIDE 0.1 MG: 0.1 TABLET ORAL at 19:02

## 2021-03-26 RX ADMIN — FOLIC ACID 1 MG: 1 TABLET ORAL at 08:30

## 2021-03-26 ASSESSMENT — ACTIVITIES OF DAILY LIVING (ADL)
DRESS: INDEPENDENT
ORAL_HYGIENE: INDEPENDENT
HYGIENE/GROOMING: INDEPENDENT
LAUNDRY: WITH SUPERVISION
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
## 2021-03-26 NOTE — PLAN OF CARE
"    The patient and/or their representative will achieve their patient-specific goals related to the plan of care.    The patient-specific goals include:     \"Reasons you are in the hospital;\" The patient identifies the following reasons for current hospitalization:   \"to get  help\"    \"Goals for Discharge\" The patient identifies the following goals for discharge:  \"would leave here and go to another  safe place where I could go and work on chemcial dependncy and mental health\"    "

## 2021-03-26 NOTE — PLAN OF CARE
Pt appeared to have slept 5.5 hours this shift. 0115 MSSA score 8 and COWS 5 due to increased phlegm, running nose, diaphoresis, and endorsing body aches and chills. /101 . Pt given 1mg ativan at this time. On pillow writer observed serosanguinous fluid on pillow dried. Pt states head is still bleeding, but appears to have stopped bleeding. At 0500 MSSA score 5 and COWS 3. No medication required at this time. Will continue to monitor and support plan of care.

## 2021-03-26 NOTE — PLAN OF CARE
"Patient participated in in 2 OT groups.   She was invited into join structured leisure group with emphasis on playfulness. She was mixing coffee together and offered no nonverbal or verbal signals that she would join group, though she did. She listened to the rules of the activity and asked questions to clarify. She was guarded initially but warmed up with time.   She participated in sensory group. She asked questions and engaged in conversation. At times she was obviously sedated, eyes rolling and lids closing as she listened. She remained alert enough to respond to questions and group demands. She completed sensory task and enjoyed the process. She said \"You're a life saver. I haven't had 1 heart-effecting thought since I've been in this room.\" She offered extra assistance to clean up and organize materials and asked about more groups.  "

## 2021-03-27 PROCEDURE — 250N000013 HC RX MED GY IP 250 OP 250 PS 637: Performed by: PSYCHIATRY & NEUROLOGY

## 2021-03-27 PROCEDURE — 124N000002 HC R&B MH UMMC

## 2021-03-27 PROCEDURE — H2032 ACTIVITY THERAPY, PER 15 MIN: HCPCS

## 2021-03-27 RX ADMIN — BUSPIRONE HYDROCHLORIDE 20 MG: 5 TABLET ORAL at 08:08

## 2021-03-27 RX ADMIN — BUPRENORPHINE AND NALOXONE 1 FILM: 8; 2 FILM, SOLUBLE BUCCAL; SUBLINGUAL at 08:09

## 2021-03-27 RX ADMIN — THIAMINE HCL TAB 100 MG 100 MG: 100 TAB at 08:08

## 2021-03-27 RX ADMIN — BUSPIRONE HYDROCHLORIDE 20 MG: 5 TABLET ORAL at 14:48

## 2021-03-27 RX ADMIN — FOLIC ACID 1 MG: 1 TABLET ORAL at 08:09

## 2021-03-27 RX ADMIN — NAPROXEN 250 MG: 250 TABLET ORAL at 14:49

## 2021-03-27 RX ADMIN — CLONIDINE HYDROCHLORIDE 0.1 MG: 0.1 TABLET ORAL at 20:54

## 2021-03-27 RX ADMIN — MULTIPLE VITAMINS W/ MINERALS TAB 1 TABLET: TAB at 08:09

## 2021-03-27 RX ADMIN — BUSPIRONE HYDROCHLORIDE 20 MG: 5 TABLET ORAL at 20:54

## 2021-03-27 RX ADMIN — NICOTINE 1 PATCH: 14 PATCH, EXTENDED RELEASE TRANSDERMAL at 08:09

## 2021-03-27 RX ADMIN — BUPRENORPHINE AND NALOXONE 1 FILM: 2; .5 FILM, SOLUBLE BUCCAL; SUBLINGUAL at 17:03

## 2021-03-27 RX ADMIN — LURASIDONE HYDROCHLORIDE 100 MG: 40 TABLET, FILM COATED ORAL at 17:03

## 2021-03-27 RX ADMIN — QUETIAPINE FUMARATE 75 MG: 25 TABLET ORAL at 20:54

## 2021-03-27 RX ADMIN — CLONIDINE HYDROCHLORIDE 0.1 MG: 0.1 TABLET ORAL at 08:09

## 2021-03-27 RX ADMIN — NAPROXEN 250 MG: 250 TABLET ORAL at 00:37

## 2021-03-27 RX ADMIN — AMITRIPTYLINE HYDROCHLORIDE 50 MG: 50 TABLET, FILM COATED ORAL at 20:54

## 2021-03-27 ASSESSMENT — ACTIVITIES OF DAILY LIVING (ADL)
DRESS: INDEPENDENT
HYGIENE/GROOMING: INDEPENDENT
LAUNDRY: WITH SUPERVISION
HYGIENE/GROOMING: HANDWASHING;INDEPENDENT
LAUNDRY: WITH SUPERVISION
ORAL_HYGIENE: INDEPENDENT
HYGIENE/GROOMING: INDEPENDENT
DRESS: SCRUBS (BEHAVIORAL HEALTH);INDEPENDENT
LAUNDRY: WITH SUPERVISION
ORAL_HYGIENE: INDEPENDENT
ORAL_HYGIENE: INDEPENDENT
DRESS: INDEPENDENT

## 2021-03-27 NOTE — PROGRESS NOTES
Patient got up and got a snack. Patient requested to have bandages to be placed on her shoulder and back where she has some abrasions. Vitals checked and MSSA and COWs assessed.

## 2021-03-27 NOTE — PROGRESS NOTES
Patient got up a couple more times asking for snack.. Patient observed dozing off at the desk area

## 2021-03-27 NOTE — PLAN OF CARE
"Patient has spent majority of the shift in her room. Has been napping on and off shift. Patient denies suicidal ideation and self injurious thoughts. Denies auditory and visual hallucinations. Reports being depressed and anxious. Requested prn naproxen for hand pain. Utilized computer. Cooperative and pleasant on approach.     Patient evaluation continues. Assessed mood,anxiety,thoughts and behavior.     Patient gradually progressing towards goals.    Patient is encouraged to participate in groups and assisted to develop healthy coping skills.     VS reviewed: BP (!) 143/94   Pulse 101   Temp 98.1  F (36.7  C) (Oral)   Resp 16   Ht 1.575 m (5' 2\")   Wt 74.8 kg (165 lb)   LMP 03/25/2021   SpO2 98%   BMI 30.18 kg/m      Length of stay: 1    Refer to daily team meeting notes for individualized plan of care. Nursing will continue to assess.    "

## 2021-03-27 NOTE — PLAN OF CARE
Problem: Behavioral Health Plan of Care  Goal: Develops/Participates in Therapeutic [place] to Support Successful Transition  Intervention: Foster Therapeutic [place]  Recent Flowsheet Documentation  Taken 3/27/2021 0900 by Sridevi Echavarria, RN  Trust Relationship/Rapport:    care explained    emotional support provided    empathic listening provided    reassurance provided    thoughts/feelings acknowledged   Patient states appetite better, VSS, MSSA score was 2, opiate withdrawal was a 6.  Came out for breakfast , took am meds got VS taken. Hr down to 90.  Blunted affect, mood sad depressed. In bed now resting.

## 2021-03-27 NOTE — PROGRESS NOTES
Pt came into group room with snacks and drinks. Writer offered that masking was required and that she could lower her mask to sip drink but this was the group rule. She left the group and did not return without explanation. She looked to be irritated about the masking although she didn't vocalize it.

## 2021-03-28 PROCEDURE — 250N000013 HC RX MED GY IP 250 OP 250 PS 637: Performed by: PSYCHIATRY & NEUROLOGY

## 2021-03-28 PROCEDURE — 93010 ELECTROCARDIOGRAM REPORT: CPT | Performed by: INTERNAL MEDICINE

## 2021-03-28 PROCEDURE — 124N000002 HC R&B MH UMMC

## 2021-03-28 RX ADMIN — FOLIC ACID 1 MG: 1 TABLET ORAL at 07:52

## 2021-03-28 RX ADMIN — CLONIDINE HYDROCHLORIDE 0.1 MG: 0.1 TABLET ORAL at 21:26

## 2021-03-28 RX ADMIN — BUSPIRONE HYDROCHLORIDE 20 MG: 5 TABLET ORAL at 13:28

## 2021-03-28 RX ADMIN — OLANZAPINE 10 MG: 10 TABLET, FILM COATED ORAL at 11:06

## 2021-03-28 RX ADMIN — MULTIPLE VITAMINS W/ MINERALS TAB 1 TABLET: TAB at 07:52

## 2021-03-28 RX ADMIN — NICOTINE 1 PATCH: 14 PATCH, EXTENDED RELEASE TRANSDERMAL at 07:52

## 2021-03-28 RX ADMIN — QUETIAPINE FUMARATE 75 MG: 25 TABLET ORAL at 21:28

## 2021-03-28 RX ADMIN — BUSPIRONE HYDROCHLORIDE 20 MG: 5 TABLET ORAL at 21:26

## 2021-03-28 RX ADMIN — THIAMINE HCL TAB 100 MG 100 MG: 100 TAB at 07:52

## 2021-03-28 RX ADMIN — BUPRENORPHINE AND NALOXONE 1 FILM: 2; .5 FILM, SOLUBLE BUCCAL; SUBLINGUAL at 16:18

## 2021-03-28 RX ADMIN — CLONIDINE HYDROCHLORIDE 0.1 MG: 0.1 TABLET ORAL at 07:52

## 2021-03-28 RX ADMIN — LURASIDONE HYDROCHLORIDE 100 MG: 40 TABLET, FILM COATED ORAL at 16:18

## 2021-03-28 RX ADMIN — BUPRENORPHINE AND NALOXONE 1 FILM: 8; 2 FILM, SOLUBLE BUCCAL; SUBLINGUAL at 07:52

## 2021-03-28 RX ADMIN — BUSPIRONE HYDROCHLORIDE 20 MG: 5 TABLET ORAL at 07:52

## 2021-03-28 RX ADMIN — AMITRIPTYLINE HYDROCHLORIDE 50 MG: 50 TABLET, FILM COATED ORAL at 21:28

## 2021-03-28 ASSESSMENT — ACTIVITIES OF DAILY LIVING (ADL)
LAUNDRY: WITH SUPERVISION
HYGIENE/GROOMING: HANDWASHING;INDEPENDENT
DRESS: INDEPENDENT
LAUNDRY: WITH SUPERVISION
ORAL_HYGIENE: INDEPENDENT
HYGIENE/GROOMING: INDEPENDENT
ORAL_HYGIENE: INDEPENDENT
DRESS: STREET CLOTHES;INDEPENDENT

## 2021-03-28 NOTE — PROGRESS NOTES
Patient states that she now feels as if her heart is beating hard. Patients blood pressured checked again and on call provider paged.
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
## 2021-03-28 NOTE — PLAN OF CARE
Pt participated in a structured Therapeutic Recreation group with a focus on leisure participation, relaxation, and exercise. Pt enterd group late, participating in the guided exercise for approximately x30 minutes. Pt followed along to approximately 75% of the guided chair exercise routine.  Pt was encouraged to use positive imagery with the deep breathing and stretching to foster relaxation, improves focus, and reduce stress.

## 2021-03-28 NOTE — PLAN OF CARE
Pt appeared to have slept 6.5 hours this shift. No concerns noted. Will continue to monitor and support plan of care.
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
## 2021-03-28 NOTE — PROGRESS NOTES
"Pt's  Bart called at 0130 requesting to speak with pt. Writer informed him the phones are not turned on until 0730 and that the pt is sleeping and comfortable. Bart was very agitated and made verbal threats to writer including \"prepare for your life to change.\" Writer attempted to contact the supervisor for Bart but he was too agitated to wait and hung up.   "

## 2021-03-29 VITALS
WEIGHT: 165 LBS | HEART RATE: 109 BPM | RESPIRATION RATE: 16 BRPM | DIASTOLIC BLOOD PRESSURE: 102 MMHG | BODY MASS INDEX: 30.36 KG/M2 | OXYGEN SATURATION: 100 % | TEMPERATURE: 98 F | HEIGHT: 62 IN | SYSTOLIC BLOOD PRESSURE: 143 MMHG

## 2021-03-29 LAB
ALBUMIN SERPL-MCNC: 3 G/DL (ref 3.4–5)
ALP SERPL-CCNC: 70 U/L (ref 40–150)
ALT SERPL W P-5'-P-CCNC: 88 U/L (ref 0–50)
AST SERPL W P-5'-P-CCNC: 87 U/L (ref 0–45)
BILIRUB DIRECT SERPL-MCNC: <0.1 MG/DL (ref 0–0.2)
BILIRUB SERPL-MCNC: 0.1 MG/DL (ref 0.2–1.3)
ERYTHROCYTE [DISTWIDTH] IN BLOOD BY AUTOMATED COUNT: 21.1 % (ref 10–15)
HCT VFR BLD AUTO: 32.3 % (ref 35–47)
HGB BLD-MCNC: 9.7 G/DL (ref 11.7–15.7)
INTERPRETATION ECG - MUSE: NORMAL
MCH RBC QN AUTO: 24.7 PG (ref 26.5–33)
MCHC RBC AUTO-ENTMCNC: 30 G/DL (ref 31.5–36.5)
MCV RBC AUTO: 82 FL (ref 78–100)
PLATELET # BLD AUTO: 360 10E9/L (ref 150–450)
PROT SERPL-MCNC: 6.9 G/DL (ref 6.8–8.8)
RBC # BLD AUTO: 3.93 10E12/L (ref 3.8–5.2)
WBC # BLD AUTO: 8.6 10E9/L (ref 4–11)

## 2021-03-29 PROCEDURE — 36416 COLLJ CAPILLARY BLOOD SPEC: CPT | Performed by: PSYCHIATRY & NEUROLOGY

## 2021-03-29 PROCEDURE — 80076 HEPATIC FUNCTION PANEL: CPT | Performed by: PSYCHIATRY & NEUROLOGY

## 2021-03-29 PROCEDURE — 99221 1ST HOSP IP/OBS SF/LOW 40: CPT | Performed by: PHYSICIAN ASSISTANT

## 2021-03-29 PROCEDURE — 124N000002 HC R&B MH UMMC

## 2021-03-29 PROCEDURE — 250N000013 HC RX MED GY IP 250 OP 250 PS 637: Performed by: PSYCHIATRY & NEUROLOGY

## 2021-03-29 PROCEDURE — 99232 SBSQ HOSP IP/OBS MODERATE 35: CPT | Performed by: PSYCHIATRY & NEUROLOGY

## 2021-03-29 PROCEDURE — 99207 PR CONSULT E&M CHANGED TO INITIAL LEVEL: CPT | Performed by: PHYSICIAN ASSISTANT

## 2021-03-29 PROCEDURE — 85027 COMPLETE CBC AUTOMATED: CPT | Performed by: PSYCHIATRY & NEUROLOGY

## 2021-03-29 RX ORDER — POLYETHYLENE GLYCOL 3350 17 G/17G
17 POWDER, FOR SOLUTION ORAL DAILY PRN
Status: DISCONTINUED | OUTPATIENT
Start: 2021-03-29 | End: 2021-03-30 | Stop reason: HOSPADM

## 2021-03-29 RX ORDER — NAPROXEN 250 MG/1
500 TABLET ORAL 2 TIMES DAILY PRN
Status: DISCONTINUED | OUTPATIENT
Start: 2021-03-29 | End: 2021-03-30 | Stop reason: HOSPADM

## 2021-03-29 RX ORDER — AMOXICILLIN 250 MG
1-2 CAPSULE ORAL 2 TIMES DAILY PRN
Status: DISCONTINUED | OUTPATIENT
Start: 2021-03-29 | End: 2021-03-30 | Stop reason: HOSPADM

## 2021-03-29 RX ORDER — BUPRENORPHINE AND NALOXONE 4; 1 MG/1; MG/1
1 FILM, SOLUBLE BUCCAL; SUBLINGUAL DAILY
Status: DISCONTINUED | OUTPATIENT
Start: 2021-03-29 | End: 2021-03-30 | Stop reason: HOSPADM

## 2021-03-29 RX ADMIN — BUSPIRONE HYDROCHLORIDE 20 MG: 5 TABLET ORAL at 08:30

## 2021-03-29 RX ADMIN — OLANZAPINE 10 MG: 10 TABLET, FILM COATED ORAL at 11:57

## 2021-03-29 RX ADMIN — BUSPIRONE HYDROCHLORIDE 20 MG: 5 TABLET ORAL at 19:28

## 2021-03-29 RX ADMIN — HYDROXYZINE HYDROCHLORIDE 25 MG: 25 TABLET, FILM COATED ORAL at 14:05

## 2021-03-29 RX ADMIN — CLONIDINE HYDROCHLORIDE 0.1 MG: 0.1 TABLET ORAL at 08:30

## 2021-03-29 RX ADMIN — FOLIC ACID 1 MG: 1 TABLET ORAL at 08:30

## 2021-03-29 RX ADMIN — TRAZODONE HYDROCHLORIDE 50 MG: 50 TABLET ORAL at 22:15

## 2021-03-29 RX ADMIN — BUSPIRONE HYDROCHLORIDE 20 MG: 5 TABLET ORAL at 14:05

## 2021-03-29 RX ADMIN — AMITRIPTYLINE HYDROCHLORIDE 50 MG: 50 TABLET, FILM COATED ORAL at 19:35

## 2021-03-29 RX ADMIN — CLONIDINE HYDROCHLORIDE 0.1 MG: 0.1 TABLET ORAL at 19:28

## 2021-03-29 RX ADMIN — NICOTINE 1 PATCH: 14 PATCH, EXTENDED RELEASE TRANSDERMAL at 08:30

## 2021-03-29 RX ADMIN — HYDROXYZINE HYDROCHLORIDE 25 MG: 25 TABLET, FILM COATED ORAL at 22:15

## 2021-03-29 RX ADMIN — DOCUSATE SODIUM 50MG AND SENNOSIDES 8.6MG 1 TABLET: 8.6; 5 TABLET, FILM COATED ORAL at 13:05

## 2021-03-29 RX ADMIN — LURASIDONE HYDROCHLORIDE 100 MG: 40 TABLET, FILM COATED ORAL at 17:54

## 2021-03-29 RX ADMIN — THIAMINE HCL TAB 100 MG 100 MG: 100 TAB at 08:30

## 2021-03-29 RX ADMIN — NAPROXEN 250 MG: 250 TABLET ORAL at 14:05

## 2021-03-29 RX ADMIN — MULTIPLE VITAMINS W/ MINERALS TAB 1 TABLET: TAB at 08:30

## 2021-03-29 RX ADMIN — BUPRENORPHINE AND NALOXONE 1 FILM: 4; 1 FILM, SOLUBLE BUCCAL; SUBLINGUAL at 16:20

## 2021-03-29 RX ADMIN — BUPRENORPHINE AND NALOXONE 1 FILM: 8; 2 FILM, SOLUBLE BUCCAL; SUBLINGUAL at 08:30

## 2021-03-29 RX ADMIN — QUETIAPINE FUMARATE 75 MG: 25 TABLET ORAL at 19:35

## 2021-03-29 NOTE — PLAN OF CARE
Problem: Suicidal Behavior  Goal: Suicidal Behavior is Absent or Managed  Outcome: Improving  Pt made several phone calls this shift. Flat affect, mood irritable. Pt wants to discharge tomorrow and requesting to speak to her CTC in the morning.  Took naps. Ate dinner in her room,  med compliant and denies medication side effects. During check in, Pt spoke about her desire to go to treatment. Some family members, especially her son, do not talk to her due to her drug use and this distresses her. Denies SI/SIB/AVH.  Pt used an ice pack for her jaw pain, states it helps.  Requested for prn Elavil and Seroquel for sleep but within an hour, Pt stated she wanted more sleep meds. Appeared anxious, tookTrazodone and Hydroxyzine. Pt and Scored a 5 on COWS at 2000.

## 2021-03-29 NOTE — PLAN OF CARE
Pt irritable this morning, refused labs and vitals x 3.  Affect appears angry and irritable.  Took morning medication and ate her breakfast in her room.  Will continue to assess pt.

## 2021-03-29 NOTE — PLAN OF CARE
"Patient has spent majority of the shift in the milieu. Made phone calls and watched television with peers. Keeps mostly to herself. Patient denies suicidal ideation and self injurious thoughts. Denies auditory or visual hallucinations. Reports being very anxious and depressed. Affect is flat and guarded. Med compliant.     Patient evaluation continues. Assessed mood,anxiety,thoughts and behavior.     Patient gradually progressing towards goals.    Patient is encouraged to participate in groups and assisted to develop healthy coping skills.     VS reviewed: BP (!) 143/90   Pulse 109   Temp 98  F (36.7  C)   Resp 16   Ht 1.575 m (5' 2\")   Wt 74.8 kg (165 lb)   LMP 03/25/2021   SpO2 100%   BMI 30.18 kg/m      Length of stay: 3    Refer to daily team meeting notes for individualized plan of care. Nursing will continue to assess.    "

## 2021-03-29 NOTE — PLAN OF CARE
Pt appears to have slept 6.25 hours this shift. Pt's  called requesting to speak to patient. Informed to call back at 0730. No concerns noted. Will continue to monitor and support plan of care.

## 2021-03-29 NOTE — PROGRESS NOTES
Dressing change to wounds on patients right shoulder and upper back. See flowsheet for details. Patient was calm and cooperative throughout interaction

## 2021-03-29 NOTE — CONSULTS
3/29/2021    CD consult completed.   I will send referrals to treatment centers and ROIs to [facility] tomorrow morning.     Recommendations:   1. Abstain from all non-prescribed mood-altering substances  2. Take all medications as prescribed  3. Enter and complete a co-occurring residential or inpatient treatment program  4. Follow all recommendations upon discharge from treatment. Recommendations may include, but are not limited to: extended treatment, outpatient treatment and/or sober housing.  5. Follow all recommendations of your medical providers     Referrals:  1. [facility]   Phone: [phone]  Fax: [phone]     2. [facility] Team for Referrals  Phone: [phone]  Fax: [phone]     [facility]'s  [address] [place] [address]  Phone: [phone]  Fax: [phone]     [facility] consult completed by: LAUREN Hendrickson.  Phone Number: [phone]             E-mail Address: [email or web address] Mental Health and Addiction Services Evaluation Department  [address] [place] [id number]

## 2021-03-29 NOTE — PROGRESS NOTES
EKG was ordered by on-call provider. Came back normal. Patient currently says that her symptoms have subsided. Eating, drinking. Alert and oriented.
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
## 2021-03-29 NOTE — PLAN OF CARE
Brief orthopedic note:    Page received by the orthopedic intern on call for Ms. Lin, a 40-year-old female with a chronic left fourth finger PIP dislocation who is currently admitted to the psychiatry service for opiate withdrawal and suicidal ideation on 3/25.    X-rays of the left hand were obtained which shows a volar dislocation of the fourth PIP joint.    Per records, the patient was previously seen for this ring finger problem in the [facility] system in 2018, at which time she had a flexion contracture of the fourth finger PIP joint of approximately 80 degrees.    At this time, no acute intervention warranted.  If the patient wishes to address this deformity as an outpatient, she can follow-up with one of our hand surgeons.    She can use the hand as tolerated without restriction.    For pain in the hand during this admission, recommend Tylenol and ibuprofen as tolerated.  No bracing will help with her flexion contracture at this point in time.      Josh Fritz MD  Orthopaedic Surgery PGY-4  #: [phone]

## 2021-03-29 NOTE — PLAN OF CARE
Work Completed:    Reviewed chart to see if there are indications that the pt was seen for ADRIEN evaluation and she has not been seen nor has the order been acknowledged.  Mary Ann Keating is now beginning the ADRIEN evaluation and is asking for the staff to supervise a video call in an interview room. No team member has been arranged to monitor someone in the interview room. Please let Mary Ann know when this can happen.  ADRIEN consult completed by: Mary Ann Keating [facility].  Phone Number: [phone]             E-mail Address: [email or web address] Evaluation Department  [address] [place] [address]         Pt went on the Internet with the psych associate to work on housing application. I had her sign an OLYA for [employer]. I sent her application to them.        Addendum:  I did sit with the pt while she completed the ADRIEN assessment. SHe asked to be referred to: SHe also asked for the phone #'s so she can call and see who can get her in the fastest.  [facility]  [facility] Program @[phone]  AND    [facility]  [facility] [address] [address]  Central Intake: Phone: [phone]  Direct to Clinic: [phone]  The Health Unit Coordinator has faxed these discharge instructions to fax:[phone]  Mar[place] - [phone]  Isa  [phone]      Discharge plan or goal: [facility]                Barriers to discharge: The ADRIEN evaluator is telehealth only and there is no staff available to monitor the telehealth services.
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
## 2021-03-30 PROCEDURE — 99239 HOSP IP/OBS DSCHRG MGMT >30: CPT | Performed by: PSYCHIATRY & NEUROLOGY

## 2021-03-30 PROCEDURE — 250N000013 HC RX MED GY IP 250 OP 250 PS 637: Performed by: PSYCHIATRY & NEUROLOGY

## 2021-03-30 RX ADMIN — THIAMINE HCL TAB 100 MG 100 MG: 100 TAB at 09:04

## 2021-03-30 RX ADMIN — CLONIDINE HYDROCHLORIDE 0.1 MG: 0.1 TABLET ORAL at 09:04

## 2021-03-30 RX ADMIN — NICOTINE 1 PATCH: 14 PATCH, EXTENDED RELEASE TRANSDERMAL at 09:04

## 2021-03-30 RX ADMIN — FOLIC ACID 1 MG: 1 TABLET ORAL at 09:04

## 2021-03-30 RX ADMIN — MULTIPLE VITAMINS W/ MINERALS TAB 1 TABLET: TAB at 09:04

## 2021-03-30 RX ADMIN — HYDROXYZINE HYDROCHLORIDE 25 MG: 25 TABLET, FILM COATED ORAL at 03:05

## 2021-03-30 RX ADMIN — TRAZODONE HYDROCHLORIDE 50 MG: 50 TABLET ORAL at 03:05

## 2021-03-30 RX ADMIN — BUPRENORPHINE AND NALOXONE 1 FILM: 8; 2 FILM, SOLUBLE BUCCAL; SUBLINGUAL at 09:04

## 2021-03-30 RX ADMIN — BUSPIRONE HYDROCHLORIDE 20 MG: 5 TABLET ORAL at 09:04
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
## 2021-03-30 NOTE — PLAN OF CARE
Work Completed:    Reviewed chart. I se that the pt is requesting discharge.    I know that her [facility] CM is out of the office intul Thursday. He had told me to call Rivera there and he is also out of the office. I tried calling Tyrell there and she is out of the office. I left a  for Colleen.    We may be able to just send the non- controlled meds to [facility] but the pt has been started on a controlled medication - suboxone - and this will cause complications.    I called the [facility] Provider to ask about the suboxone.. The  is forwarding my message to her and her nurse.        Discharge plan or goal: Home with a friend for one night while arranging for admission to [facility] treatment                Barriers to discharge:   [facility]  Women s Program @[phone]  AND     [facility]  [facility] [address] [address]  Central Intake: Phone: [phone]  Direct to Clinic: [phone]

## 2021-03-30 NOTE — PLAN OF CARE
Problem: Behavior Regulation Impairment (Excessive Substance Use)  Goal: Improved Behavioral Control (Excessive Substance Use)  Outcome: Adequate for Discharge    Pt was discharged AMA.  Pt was discharged home with a friend for one night while arranging for admission to [facility] treatment.  Pt was given copy of the discharge instructions and medication administration instructions. Pt reports no questions at this time regarding discharge plan.   Pt denies any suicidal ideation, plans or intent at this time. Pt received her belongings.

## 2021-03-30 NOTE — PLAN OF CARE
Pt appears to have slept 6 hours this shift. Pt received prn medication for anxiety and sleep. No additional concerns noted. Will continue to monitor and support plan of care.
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
## 2021-04-05 ENCOUNTER — NURSE TRIAGE (OUTPATIENT)
Dept: NURSING | Facility: CLINIC | Age: 41
End: 2021-04-05

## 2021-04-05 NOTE — TELEPHONE ENCOUNTER
[facility] requesting pt records.  Call transferred to HIM.     Mihaela Pacheco RN/FNA

## 2021-09-04 ENCOUNTER — HEALTH MAINTENANCE LETTER (OUTPATIENT)
Age: 41
End: 2021-09-04

## 2021-12-10 ENCOUNTER — OFFICE VISIT (OUTPATIENT)
Dept: BEHAVIORAL HEALTH | Facility: CLINIC | Age: 41
End: 2021-12-10
Payer: COMMERCIAL

## 2021-12-10 VITALS
SYSTOLIC BLOOD PRESSURE: 159 MMHG | WEIGHT: 206.6 LBS | HEART RATE: 119 BPM | BODY MASS INDEX: 37.79 KG/M2 | DIASTOLIC BLOOD PRESSURE: 99 MMHG | OXYGEN SATURATION: 97 %

## 2021-12-10 DIAGNOSIS — F11.93 OPIOID WITHDRAWAL (H): ICD-10-CM

## 2021-12-10 DIAGNOSIS — F11.20 OPIOID USE DISORDER, SEVERE, DEPENDENCE (H): Primary | ICD-10-CM

## 2021-12-10 PROCEDURE — 99204 OFFICE O/P NEW MOD 45 MIN: CPT | Performed by: FAMILY MEDICINE

## 2021-12-10 RX ORDER — BUPRENORPHINE AND NALOXONE 8; 2 MG/1; MG/1
1 FILM, SOLUBLE BUCCAL; SUBLINGUAL 2 TIMES DAILY
Qty: 10 FILM | Refills: 0 | Status: SHIPPED | OUTPATIENT
Start: 2021-12-10 | End: 2022-01-17

## 2021-12-10 NOTE — PROGRESS NOTES
" [facility] - Recovery Clinic Initial Visit    Elaina presents to the Recovery Clinic for Suboxone. She was previously prescribed Suboxone at [facility]. She missed 2 appointments and was not able to return there for Suboxone. Last Suboxone dose \"about a week ago.\"    Elaina is a Restricted Recipient through [facility]. She contacted her [facility] Restricted Recipient Care Coordinator while in clinic to request Dr. Garg and [facility] be authorized.    Per insurance patient is restricted to the below providers:  PCP: TYSON MOSQUERA, [facility]: [facility]  Pharmacy: [facility]     Per [place], patient is listed as Elaina Lance. When asked most current name, patient replied \"whichever is easier;\" reports she has IDs with both names.    Unable to leave urine sample at this time.    Kiana Richard RN on 12/10/2021 at 2:19 PM      ASSESSMENT/PLAN                                                      1. Opioid use disorder, severe, dependence (H)  2. Opioid withdrawal (H)  Based on history and exam she is clearly experiencing opioid withdrawal.  Reports being out of Suboxone for 1 week though this does not match what I am finding in  (though there are discrepancies with her name too so possible it is not correct).  She was not able to leave a UDS but unlikely buprenorphine would show up this far removed from last dose.  We discussed resuming buprenorphine and the risk of precipitated withdrawal if she has been using other opioids, recommend at least 24 hours from last dose of opioids.  Reviewed risks of taking buprenorphine with other sedating medications such as alcohol, benzodiazepines, and other opioids.  In regards to dosing I advised her to increase dose slowly to 8mg BID and then can add in additonal 4mg again if needed (previous dose).   - buprenorphine HCl-naloxone HCl (SUBOXONE) 8-2 MG per film; Place 1 Film under the tongue 2 " "times daily  Dispense: 10 Film; Refill: 0  - naloxone (NARCAN) 4 MG/0.1ML nasal spray; Spray 1 spray (4 mg) into one nostril alternating nostrils once as needed for opioid reversal every 2-3 minutes until assistance arrives  Dispense: 0.2 mL; Refill: 11    She will see me back on Monday 12/13.        SUBJECTIVE                                                      CC/HPI:  Elaina Lin is a 40 year old female with PMHx of diabetes, bipolar 1, PTSD, and PSUD who presents to the Recovery Clinic for initial visit.  Pt was referred by a flyer she saw at detox in the past.   Pt decided to seek treatment at this time due to running out of Suboxone.    Substance Use History :  Opioids:    Age at first use: 26 years old  Most recent opioid use: \"years ago\" per patient but chart review suggests earlier this year     IV drug use: No   History of overdose: Yes: multiple  Previous MAT:  Yes (methadone and Suboxone - on for several years)  Previous treatments : Yes: 2 weeks ago (just finished at [facility])  Longest period of sobriety: 4 years   Medical complications related to substance use: denies  Hepatitis C Status:  Non-reactive (3/2020)  HIV Status:  Negative 10/25/21)    Narcan currently available: No     Here history of PSUD, OUD in particular is well documented.  She was admitted to [facility] in March 2021.  Review of records shows she did see Dr Jose Sanchez at [facility] about 2 months ago and was prescribed Suboxone for history of OUD, total daily dose at that visit was 24mg daily.  She reports most recently she has been taking 16mg in the morning and 4mg in the afternoon.  She has been out of Suboxone for 1 week and states she is not able to follow-up with her most recent prescriber at [facility] due to too many no-shows.  She reports that she has done well with Suboxone in the past and denies any side effects.  She is quite miserable currently due to withdrawal and is a poor historian.        Other Addiction " "History:  Stimulants:   substance: crack cocaine; 21    Sedatives/hypnotics/anxiolytics: denies    Alcohol: last use 21    Nicotine: former smoker    Cannabis: denies    Hallucinogens: denies    Behavioral addictions: denies    [place] Prescription Drug Monitoring Program Reviewed:  Yes. Per  last script filled 10/19/21.    Past Medical History:   Diagnosis Date     Anxiety      Depressive disorder      Genital herpes 2012     IMO update changed this record. Please review for accuracy     H/O: substance abuse (H) 2013     Hypertension      Postpartum depression 10/21/2011     Recur major depress, severe 2012     Substance abuse (H)        PAST PSYCHIATRIC HISTORY:  Diagnoses- bipolar 1   Suicide Attempts: No   Hospitalizations: Yes     PHQ-2 Score:     PHQ-2 (  [employer]) 2014   Q1: Little interest or pleasure in doing things 0 0   Q2: Feeling down, depressed or hopeless 0 3   PHQ-2 Score 0 3      If PHQ-2 score of 3 or higher, has Recovery Clinic therapist or provider been notified? Yes    Any current suicidal ideation? No  If yes, has Recovery Clinic therapist or provider been notified? N/A    Mental health provider: yes at [facility]    Past Surgical History:   Procedure Laterality Date      SECTION  2011 and 13      SECTION      three, most recently 16     CHOLECYSTECTOMY  2017     HERNIA REPAIR       HERNIA REPAIR      \"as a child\"     REPAIR TENDON HAND         Medications:  albuterol (PROAIR HFA/PROVENTIL HFA/VENTOLIN HFA) 108 (90 Base) MCG/ACT Inhaler, Inhale 2 puffs into the lungs 4 times daily as needed for other (dyspnea) (Patient not taking: Reported on 12/10/2021)  amitriptyline (ELAVIL) 50 MG tablet, Take  mg by mouth nightly as needed for sleep (Patient not taking: Reported on 12/10/2021)  buprenorphine-naloxone (SUBOXONE) 8-2 MG SUBL sublingual tablet, Place 2 tablets under the tongue daily (Patient not taking: Reported on " 12/10/2021)  busPIRone (BUSPAR) 10 MG tablet, Take 20 mg by mouth 3 times daily (Patient not taking: Reported on 12/10/2021)  cloNIDine (CATAPRES) 0.1 MG tablet, Take 0.1 mg by mouth 2 times daily (Patient not taking: Reported on 12/10/2021)  EPINEPHrine (EPIPEN/ADRENACLICK/OR ANY BX GENERIC EQUIV) 0.3 MG/0.3ML injection 2-pack, Inject 0.3 mLs (0.3 mg) into the muscle once as needed for anaphylaxis (Patient not taking: Reported on 12/10/2021)  lurasidone (LATUDA) 80 MG TABS tablet, Take 80 mg by mouth daily with food (Patient not taking: Reported on 12/10/2021)  QUEtiapine (SEROQUEL) 25 MG tablet, Take 25-75 mg by mouth nightly as needed (Patient not taking: Reported on 12/10/2021)    No current facility-administered medications on file prior to visit.      Allergies   Allergen Reactions     Tomato Anaphylaxis     Compazine [Prochlorperazine]      Lisinopril Angioedema       Family History   Problem Relation Age of Onset     Asthma Mother      Diabetes Mother      Allergies Mother      Psychotic Disorder Mother         schitzophrenic     Diabetes Maternal Grandmother      Heart Disease Maternal Grandmother         triple bypass     Eye Disorder Maternal Grandmother         cataracts     Hypertension Maternal Grandmother      Unknown/Adopted Maternal Grandfather      Unknown/Adopted Paternal Grandmother      Unknown/Adopted Paternal Grandfather      Cancer Sister         leukemia     Social History  Housing status: between various residences (no permanent address)  Employment status: Unemployed, not seeking work  Relationship status: Partnered  Children: no children  Legal: none  Insurance needs: restricted  Contact information up to date? yes    3rd Party Involvement - no (please obtain OLYA if pt would like to include)    REVIEW OF SYSTEMS:  General: Withdrawal symptoms as described below.  No recent fevers.   Eyes:  No vision concerns.  No jaundice.    Resp: No coughing, wheezing or shortness of breath  CV: No  chest pains or palpitations  GI: No complaints other than as above  : No urinary frequency or dysuria, no discharge  Musculoskeletal: No significant muscle or joint pains other than as above.  No edema  Neurologic: No numbness, tingling, weakness, problems with balance or coordination  Psychiatric: No acute concerns other than as above.   Skin: No rashes or areas of acute infection    OBJECTIVE                                                        Clinical Opioid Withdrawal Scale (COWS)    Resting Pulse Rate  2  =  101-120   Sweating    (over past 1/2 hour) 3  =  beads of sweat on brow or face   Restlessness  5  =  unable to sit still for more than a few seconds   Pupil size  0  =  pupils pinned or normal size for room light   Bone or Joint Aches    (acute only) 4  =  patient is rubbing joints or muscles and is unable to sit still because of discomfort   Runny nose or tearing    (unrelated to cold/allergies) 2  =  nose running or tearing   GI Upset    (over past 1/2 hour) 3  =  vomiting or diarrhea   Tremor    (outstretched hands) 2  =  slight tremor observable   Yawning    (during assessment) 0  =  no yawning   Anxiety/Irritability 2  =  patient obviously irritable or anxious   Gooseflesh skin 3  =  piloerection or skin can be felt or hairs standing up on arms     TOTAL SCORE  Add column for score   25       BP (!) 159/99   Pulse 119   SpO2 97%   No LMP recorded.      Physical Exam    Labs:    UDS: patient unable to leave sample  *[facility]OC urine drug screen does not screen for Fentanyl    No results found for this or any previous visit (from the past 720 hour(s)).    Patient counseling completed today:  Discussed mechanism of action, potential risks/benefits/side effects of medications and other recommendations above.  Discussed risk of precipitated withdrawal with initiation of buprenorphine in the presence of full opioid agonists.  Reviewed directions for initiation of buprenorphine to reduce risk of precipitated  withdrawal and maximize efficacy.  Recommend pt keep naloxone in their possession and reviewed other aspects of harm reduction to reduce risk of overdose with return to use.   Recommended avoiding concurrent use of alcohol, benzodiazepines or other sedatives with buprenorphine or other opioids.  Discussed importance of avoiding isolation, building a network of supportive relationships, avoiding people/places/things associated with past use to reduce risk of relapse; including motivational interviewing regarding psychosocial treatment for addiction.     [facility]  [address] [place] [id number] [phone]

## 2022-01-17 ENCOUNTER — OFFICE VISIT (OUTPATIENT)
Dept: BEHAVIORAL HEALTH | Facility: CLINIC | Age: 42
End: 2022-01-17
Payer: COMMERCIAL

## 2022-01-17 VITALS — DIASTOLIC BLOOD PRESSURE: 104 MMHG | SYSTOLIC BLOOD PRESSURE: 156 MMHG | HEART RATE: 136 BPM

## 2022-01-17 DIAGNOSIS — F10.20 UNCOMPLICATED ALCOHOL DEPENDENCE (H): ICD-10-CM

## 2022-01-17 DIAGNOSIS — F31.81 BIPOLAR II DISORDER (H): ICD-10-CM

## 2022-01-17 DIAGNOSIS — F14.10 COCAINE ABUSE (H): ICD-10-CM

## 2022-01-17 DIAGNOSIS — T78.00XD ANAPHYLACTIC SHOCK DUE TO FOOD, SUBSEQUENT ENCOUNTER: ICD-10-CM

## 2022-01-17 DIAGNOSIS — F11.20 OPIOID USE DISORDER, SEVERE, DEPENDENCE (H): Primary | ICD-10-CM

## 2022-01-17 LAB
FENTANYL UR QL: NORMAL
HCG UR QL: NEGATIVE

## 2022-01-17 PROCEDURE — 99214 OFFICE O/P EST MOD 30 MIN: CPT | Performed by: FAMILY MEDICINE

## 2022-01-17 PROCEDURE — 80307 DRUG TEST PRSMV CHEM ANLYZR: CPT | Performed by: FAMILY MEDICINE

## 2022-01-17 PROCEDURE — 81025 URINE PREGNANCY TEST: CPT | Performed by: FAMILY MEDICINE

## 2022-01-17 RX ORDER — EPINEPHRINE 0.3 MG/.3ML
0.3 INJECTION SUBCUTANEOUS
Qty: 0.6 ML | Refills: 1 | Status: SHIPPED | OUTPATIENT
Start: 2022-01-17

## 2022-01-17 RX ORDER — BUPRENORPHINE AND NALOXONE 8; 2 MG/1; MG/1
1 FILM, SOLUBLE BUCCAL; SUBLINGUAL 2 TIMES DAILY
Qty: 20 FILM | Refills: 0 | Status: SHIPPED | OUTPATIENT
Start: 2022-01-17 | End: 2022-02-08

## 2022-01-17 NOTE — PROGRESS NOTES
"[facility] - Recovery Clinic Follow Up    ASSESSMENT/PLAN                                                      1. Opioid use disorder, severe, dependence (H)  She reports that she has been purchasing buprenorphine on street and taking 8mg BID with good relief of symptoms.  We can continue this dose (reviewed risks of precipitated withdrawal).  Reviewed importance of avoid other sedating substances such as EtOH, benzos, and other opioids.  Script for narcan provided.    - HCG qualitative urine; Standing  - Fentanyl Urine, Qualitative; Standing  - naloxone (NARCAN) 4 MG/0.1ML nasal spray; Spray 1 spray (4 mg) into one nostril alternating nostrils once as needed for opioid reversal every 2-3 minutes until assistance arrives  Dispense: 0.2 mL; Refill: 11  - buprenorphine HCl-naloxone HCl (SUBOXONE) 8-2 MG per film; Place 1 Film under the tongue 2 times daily  Dispense: 20 Film; Refill: 0  - HCG qualitative urine  - Fentanyl Urine, Qualitative    2. Uncomplicated alcohol dependence (H)  She denies EtOH use since last visit.      3. Cocaine abuse (H)  Continues to use cocaine a few times per week.  Very guarded.  We will continue to monitor/discuss.      4. Bipolar II disorder (H)  She reports mood is \"OK.\"  Not currently taking medications and declines referral to psychiatry.  Will monitor.      5. Anaphylactic shock due to food, subsequent encounter  Noted to have history of anaphylaxis, she does not have Epi pen so script sent to pharmacy.  - EPINEPHrine (ANY BX GENERIC EQUIV) 0.3 MG/0.3ML injection 2-pack; Inject 0.3 mLs (0.3 mg) into the muscle once as needed for anaphylaxis  Dispense: 0.6 mL; Refill: 1    She will see me back in 1 week, sooner if needed.      SUBJECTIVE                                                          CC/HPI:  Elaina Lance is a 41 year old female with PMHx of diabetes, bipolar 1, PTSD, and PSUD who presents to the Recovery Clinic for follow up regarding treatment of opioid use " disorder.      First Recovery Clinic visit: 12/10/21    Last date of illicit opioid use: unknown (denies recent use)    Brief history of use:  Began using opioids at age 26.  Has used intermittently, on buprenorphine inconsistently.      IV drug use: No   History of overdose: Yes: multiple  Previous MAT:  Yes (methadone and Suboxone - on for several years)  Previous treatments : Yes:  (most recently at [facility])  Longest period of sobriety: 4 years   Medical complications related to substance use: denies  Hepatitis C Status:  Non-reactive (3/2020)  HIV Status:  Negative (10/25/21)      Most recent Recovery Clinic visit: 12/10/21    Important points and recommendations last visit:  1. Opioid use disorder, severe, dependence (H)  2. Opioid withdrawal (H)  Based on history and exam she is clearly experiencing opioid withdrawal.  Reports being out of Suboxone for 1 week though this does not match what I am finding in  (though there are discrepancies with her name too so possible it is not correct).  She was not able to leave a UDS but unlikely buprenorphine would show up this far removed from last dose.  We discussed resuming buprenorphine and the risk of precipitated withdrawal if she has been using other opioids, recommend at least 24 hours from last dose of opioids.  Reviewed risks of taking buprenorphine with other sedating medications such as alcohol, benzodiazepines, and other opioids.  In regards to dosing I advised her to increase dose slowly to 8mg BID and then can add in additonal 4mg again if needed (previous dose).   - buprenorphine HCl-naloxone HCl (SUBOXONE) 8-2 MG per film; Place 1 Film under the tongue 2 times daily  Dispense: 10 Film; Refill: 0  - naloxone (NARCAN) 4 MG/0.1ML nasal spray; Spray 1 spray (4 mg) into one nostril alternating nostrils once as needed for opioid reversal every 2-3 minutes until assistance arrives  Dispense: 0.2 mL; Refill: 11    Today, pt states she is doing  "ok.  Since running out of her Suboxone prescription she has been buying Suboxone from nonprescription sources, 8mg BID.  Last dose was Friday, hasn't been able to get it since then and feeling withdrawal symptoms currently.  Denies side effects of Suboxone.  No opioid cravings when taking.      She is not seeing psychiatry currently.  Mental health has been \"fine.\"  Declines psychiatry referral.  Sleep is \"OK.\"  Appetite is \"OK.\"      No recent alcohol use.  Continues to use cocaine 2-3 times per week.      [place] Prescription Drug Monitoring Program Reviewed:  Yes; as expected.  Need to look her up under previous name to see that she filled script from me on 12/10/21 for Suboxone 8-2mg film BID (#10).      Medications:  No current outpatient medications on file prior to visit.  No current facility-administered medications on file prior to visit.      Allergies   Allergen Reactions     Tomato Anaphylaxis     Compazine [Prochlorperazine]      Lisinopril Angioedema       PMH, PSH, FamHx reviewed    Social History  Housing status: homeless  Employment status: Unemployed, not seeking work  Relationship status: Partnered  Children: no children    OBJECTIVE                                                      BP (!) 156/104 (BP Location: Right arm, Patient Position: Sitting)   Pulse (!) 136     Physical Exam  Vitals and nursing note reviewed.   Constitutional:       General: She is in acute distress (mild).      Appearance: Normal appearance. She is not diaphoretic.   HENT:      Head: Normocephalic and atraumatic.   Eyes:      General: No scleral icterus.     Conjunctiva/sclera: Conjunctivae normal.   Cardiovascular:      Rate and Rhythm: Tachycardia present.   Pulmonary:      Effort: Pulmonary effort is normal.   Skin:     General: Skin is warm and dry.   Neurological:      General: No focal deficit present.      Mental Status: She is alert and oriented to person, place, and time.      Gait: Gait normal. "   Psychiatric:         Attention and Perception: Attention normal.         Mood and Affect: Mood is depressed. Affect is flat.         Speech: Speech normal.         Behavior: Behavior normal. Behavior is cooperative.         Thought Content: Thought content does not include suicidal ideation.      Comments: Insight/judgment fair; very guarded          Labs:    UDS: buprenorphine and cocaine  *POC urine drug screen does not screen for Fentanyl    Recent Results (from the past 240 hour(s))   HCG qualitative urine    Collection Time: 01/17/22 12:21 PM   Result Value Ref Range    hCG Urine Qualitative Negative Negative   Fentanyl Urine, Qualitative    Collection Time: 01/17/22 12:21 PM   Result Value Ref Range    Fentanyl Qual Urine Screen Negative Screen Negative       Patient counseling completed today:  Discussed mechanism of action, potential risks/benefits/side effects of medications and other recommendations above.  Recommend pt keep naloxone in their possession and reviewed other aspects of harm reduction to reduce risk of overdose with return to use.   Recommended avoiding concurrent use of alcohol, benzodiazepines or other sedatives with buprenorphine or other opioids.  Discussed importance of avoiding isolation, building a network of supportive relationships, avoiding people/places/things associated with past use to reduce risk of relapse; including motivational interviewing regarding psychosocial treatment for addiction.     Yessica Garg,   [facility]  [address] [place] [id number] [phone]

## 2022-01-17 NOTE — NURSING NOTE
[facility]      Rooming information:  Approximate last use of illicit opioids: Unknown per patient  Taking buprenorphine? Yes:  As prescribed? Yes:   Number of buprenorphine films/tablets remaining currently: 0  Side effects related to buprenorphine (constipation, dry mouth, sedation?) No   Narcan currently available: No  Other recent substance use:    Denies and Cocaine   NICOTINE-Yes: Cigarettes  If using nicotine, ready to quit? No    Point of care urine drug screen positive for: buprenorphine and cocaine  *POC urine drug screen does not screen for Fentanyl    Pregnancy Status   LMP: About 2 weeks ago 01/03/2022  Birth control/barriers: denies  Interested in birth control if none currently? No  Urine pregnancy test specimen obtained and sent to lab:Yes    PHQ-2 Score:     PHQ-2 ( 1999 [employer]) 1/17/2022 12/10/2021   Q1: Little interest or pleasure in doing things 3 3   Q2: Feeling down, depressed or hopeless 3 3   PHQ-2 Score 6 6        If PHQ-2 score of 3 or higher, has Recovery Clinic therapist or provider been notified? Yes    Any current suicidal ideation? No  If yes, has Recovery Clinic therapist or provider been notified? N/A    Primary care provider: Vidya Russell PA-C     Mental health provider: Sanjeev (follow up on MH referral if needed)    Insurance needs: Active    Housing needs: Homeless    Contact information up to date? No phone     3rd Party Involvement None today (please obtain OLYA if pt would like to include)    Beba Singleton RN  January 17, 2022  9:42 AM

## 2022-02-04 ENCOUNTER — TELEPHONE (OUTPATIENT)
Dept: BEHAVIORAL HEALTH | Facility: CLINIC | Age: 42
End: 2022-02-04

## 2022-02-04 NOTE — TELEPHONE ENCOUNTER
Writer called patient due to no show today at the Recovery Clinic, pt states she has a lot of anxiety coming out the house and is asking to do virtual visits. Pt states she will come and do a drug screen but asking if the rest of the visit can be over the phone. Pt did reschedule to 2/7/22

## 2022-02-04 NOTE — TELEPHONE ENCOUNTER
Would prefer to have her come in person on 2/7 as planned and I can discuss concern with her then.    Yessica Garg, DO on 2/4/2022 at 4:42 PM

## 2022-02-08 ENCOUNTER — OFFICE VISIT (OUTPATIENT)
Dept: BEHAVIORAL HEALTH | Facility: CLINIC | Age: 42
End: 2022-02-08
Payer: COMMERCIAL

## 2022-02-08 ENCOUNTER — TELEPHONE (OUTPATIENT)
Dept: BEHAVIORAL HEALTH | Facility: CLINIC | Age: 42
End: 2022-02-08

## 2022-02-08 ENCOUNTER — HOSPITAL ENCOUNTER (EMERGENCY)
Facility: CLINIC | Age: 42
Discharge: HOME | End: 2022-02-08
Attending: EMERGENCY MEDICINE | Admitting: EMERGENCY MEDICINE
Payer: COMMERCIAL

## 2022-02-08 ENCOUNTER — TELEPHONE (OUTPATIENT)
Dept: BEHAVIORAL HEALTH | Facility: CLINIC | Age: 42
End: 2022-02-08
Payer: COMMERCIAL

## 2022-02-08 VITALS
RESPIRATION RATE: 14 BRPM | WEIGHT: 168 LBS | HEART RATE: 92 BPM | BODY MASS INDEX: 30.73 KG/M2 | SYSTOLIC BLOOD PRESSURE: 157 MMHG | TEMPERATURE: 98.6 F | DIASTOLIC BLOOD PRESSURE: 99 MMHG | OXYGEN SATURATION: 98 %

## 2022-02-08 DIAGNOSIS — F11.20 OPIOID USE DISORDER, SEVERE, DEPENDENCE (H): ICD-10-CM

## 2022-02-08 DIAGNOSIS — F11.10 OPIOID ABUSE (H): ICD-10-CM

## 2022-02-08 LAB — HCG UR QL: NEGATIVE

## 2022-02-08 PROCEDURE — 99214 OFFICE O/P EST MOD 30 MIN: CPT | Performed by: FAMILY MEDICINE

## 2022-02-08 PROCEDURE — 99283 EMERGENCY DEPT VISIT LOW MDM: CPT | Performed by: EMERGENCY MEDICINE

## 2022-02-08 PROCEDURE — 99284 EMERGENCY DEPT VISIT MOD MDM: CPT | Performed by: EMERGENCY MEDICINE

## 2022-02-08 PROCEDURE — 250N000013 HC RX MED GY IP 250 OP 250 PS 637: Performed by: EMERGENCY MEDICINE

## 2022-02-08 PROCEDURE — 81025 URINE PREGNANCY TEST: CPT | Performed by: FAMILY MEDICINE

## 2022-02-08 PROCEDURE — 250N000011 HC RX IP 250 OP 636: Performed by: EMERGENCY MEDICINE

## 2022-02-08 RX ORDER — BUPRENORPHINE AND NALOXONE 8; 2 MG/1; MG/1
1 FILM, SOLUBLE BUCCAL; SUBLINGUAL 2 TIMES DAILY
Qty: 30 FILM | Refills: 0 | Status: SHIPPED | OUTPATIENT
Start: 2022-02-08 | End: 2022-03-16

## 2022-02-08 RX ORDER — BUPRENORPHINE AND NALOXONE 8; 2 MG/1; MG/1
1 FILM, SOLUBLE BUCCAL; SUBLINGUAL DAILY
Status: DISCONTINUED | OUTPATIENT
Start: 2022-02-08 | End: 2022-02-08 | Stop reason: HOSPADM

## 2022-02-08 RX ORDER — ONDANSETRON 4 MG/1
4 TABLET, ORALLY DISINTEGRATING ORAL ONCE
Status: COMPLETED | OUTPATIENT
Start: 2022-02-08 | End: 2022-02-08

## 2022-02-08 RX ADMIN — ONDANSETRON 4 MG: 4 TABLET, ORALLY DISINTEGRATING ORAL at 13:06

## 2022-02-08 RX ADMIN — BUPRENORPHINE AND NALOXONE 1 FILM: 8; 2 FILM BUCCAL; SUBLINGUAL at 11:23

## 2022-02-08 RX ADMIN — ONDANSETRON 4 MG: 4 TABLET, ORALLY DISINTEGRATING ORAL at 11:13

## 2022-02-08 ASSESSMENT — ENCOUNTER SYMPTOMS
MYALGIAS: 1
DIAPHORESIS: 1
DIARRHEA: 1
VOMITING: 1
NAUSEA: 1
CHILLS: 1

## 2022-02-08 NOTE — DISCHARGE INSTRUCTIONS
Buprenorphine Home Induction Instructions      Before taking your first dose, you will need to stop using all opioids for 12-36 hours and you will need to feel very sick with opioid withdrawal symptoms.  If you take buprenorphine too soon you will feel worse and will go into full opioid withdrawal!     For short-acting opioids like Pure Heroin, Percocet, Vicodin, Morphine it usually takes 12-24 hours before first dose.    For long-acting opioids like Oxycontin, MS Contin, Fentanyl and drugs laced with Fentanyl (Perc30) it can take 36 hours before first dose.  Methadone can take greater than 48 hours.     Before you take your first dose you should feel very sick and have at least 3 of the following symptoms:     bad chills or sweating  heavy yawning  joint/bone aches  enlarged pupils  runny nose or watery eyes  feeling restless  goose bumps  anxious or irritable  cramps, nausea, vomiting or diarrhea  twitching/tremors/shakes        Home Induction Day 1:    If you feel you are ready for your first buprenorphine dose (based on above guidelines) take 4 mg buprenorphine.  Put the tablet or film under your tongue.  Do not swallow it.  It does not work if swallowed.  Do not eat, drink or smoke anything while it is dissolving.  Wait 2 minutes after it has dissolved before you swallow or put anything in your mouth.     Wait 1 hour.      If you feel worse, do not take anymore buprenorphine for 12 hours.  You likely took your first dose too soon and it is making you have withdrawal symptoms.     If you are feeling fine, do not take any more medication today.      If you are feeling better but still having some withdrawal symptoms, take a 2nd dose of 4 mg under your tongue.    Wait 2-4 hours.      If you are feeling fine, do not take any more medication today.      If you are still having withdrawal symptoms, take a 3rd dose of 4 mg under your tongue.      Wait 2-4 hours.      If you are feeling fine, do not take any more  medication today.      If you are still having withdrawal symptoms, take a 4th dose of 4 mg under your tongue.     STOP.  Maximum dose is 16 mg of buprenorphine total.  Do not take more.  Most people feel better after 4-16 mg total on Day 1.         Home Induction Day 2:     Your dose today will be based on your response to the total amount of buprenorphine taken on Day 1.  Day 2 dose = total mg taken on Day 1.     Take today's dose in the morning.  If Day 2 dose is more than 8 mg, you can split your dose between morning and evening.    If you felt sleepy on Day 1, you should take 4 mg less today than you did on Day 1.     Later today if you feel withdrawal symptoms, you may want to take another 4 mg.    Maximum dose Day 2 is 16 mg.         Home Induction Day 3:    Today's dose will be based on your response to the total amount of buprenorphine taken on Day 2.  Day 3 dose = total mg taken on Day 2.    If Day 3 dose is more than 8 mg, you can split your dose between morning and evening.    Maximum dose Day 3 is 16 mg.         Follow up with your addiction specialist as scheduled for further dosing recommendations and medication refills.     [address], [address]  [place] [address]  Phone: [phone]  Fax:  [phone]       Hours:       Monday, Wednesday, Friday     Scheduled visits: 9:00 am - 4:00 pm     Walk-in hours:  9:00 am - 3:00 pm        Tuesday, Thursday     Walk-in only hours: 1:30 pm - 4:00 pm

## 2022-02-08 NOTE — ED NOTES
Bed: HW03  Expected date: 2/8/22  Expected time:   Means of arrival:   Comments:  N 712    42 yo F  Withdrawing from suboxone

## 2022-02-08 NOTE — NURSING NOTE
[facility]      Rooming information:  Approximate last use of illicit opioids: around 2/1/22  Taking buprenorphine? Yes:  As prescribed? No  Number of buprenorphine films/tablets remaining currently: 0  Side effects related to buprenorphine (constipation, dry mouth, sedation?) Yes: constipation and dry mouth  Narcan currently available: Yes  Other recent substance use:    Alcohol  and Cannabis   NICOTINE-Yes:   If using nicotine, ready to quit? No    Point of care urine drug screen positive for: buprenorphine and THC  *POC urine drug screen does not screen for Fentanyl    Pregnancy Status   LMP: 2/1/2022  Birth control/barriers: denies  Interested in birth control if none currently? No  Urine pregnancy test specimen obtained and sent to lab:yes    PHQ-2 Score:     PHQ-2 ( 1999 Pfizer) 2/8/2022 1/17/2022   Q1: Little interest or pleasure in doing things 3 3   Q2: Feeling down, depressed or hopeless 3 3   PHQ-2 Score 6 6        If PHQ-2 score of 3 or higher, has Recovery Clinic therapist or provider been notified? Yes    Any current suicidal ideation? No  If yes, has Recovery Clinic therapist or provider been notified? N/A    Primary care provider: Vidya Russell PA-C     Mental health provider: denies (follow up on MH referral if needed)    Insurance needs: active    Housing needs: homless    Contact information up to date? No phone    3rd Party Involvement none today (please obtain OLYA if pt would like to include)    Stacey Shore CMA  February 8, 2022  2:37 PM

## 2022-02-08 NOTE — TELEPHONE ENCOUNTER
Writer spoke with Julito tanner/[employer] @[phone] to add Dr. Thomason to patients restricted recipients list.     Beba Singleton RN on 2/8/2022 at 2:37 PM

## 2022-02-08 NOTE — PROGRESS NOTES
[facility] Follow Up    ASSESSMENT/PLAN                                                      1. Opioid use disorder, severe, dependence (H)  Pt reporting return to use after running out of buprenorphine, last use of full opioid agonists 2/1/22.  Tolerated 4mg buprenorphine in ED today.   Resume buprenorphine 16mg/day.   Pt confirmed she knows how to schedule a medical cab for return visit scheduled in 2 weeks.    Pt confirmed she has naloxone.   - HCG qualitative urine  - buprenorphine HCl-naloxone HCl (SUBOXONE) 8-2 MG per film; Place 1 Film under the tongue 2 times daily  Dispense: 30 Film; Refill: 0    Return in about 2 weeks (around 2/22/2022) for Follow up, in person.      SUBJECTIVE                                                          CC/HPI:  Elaina Lance is a 41 year old female with PMHx of DM2, bipolar 1, PTSD, and PSUD primarily opioids on buprenorphine who presents to the Recovery Clinic for follow up.     First Recovery Clinic visit: 12/10/21    Last date of illicit opioid use: 2/1/22    Brief history of use:  Began using opioids at age 26.  Has used intermittently, on buprenorphine inconsistently.      IV drug use: No   History of overdose: Yes: multiple  Previous MAT:  Yes (methadone and Suboxone - on for several years)  Previous treatments : Yes:  (most recently at [facility])  Longest period of sobriety: 4 years   Medical complications related to substance use: denies  Hepatitis C Status:  Non-reactive (3/2020)  HIV Status:  Negative (10/25/21)      Most recent Recovery Clinic visit: 1/17/22  A/P last visit:  1. Opioid use disorder, severe, dependence (H)  She reports that she has been purchasing buprenorphine on street and taking 8mg BID with good relief of symptoms.  We can continue this dose (reviewed risks of precipitated withdrawal).  Reviewed importance of avoid other sedating substances such as EtOH, benzos, and other opioids.  Script for narcan  "provided.    - HCG qualitative urine; Standing  - Fentanyl Urine, Qualitative; Standing  - naloxone (NARCAN) 4 MG/0.1ML nasal spray; Spray 1 spray (4 mg) into one nostril alternating nostrils once as needed for opioid reversal every 2-3 minutes until assistance arrives  Dispense: 0.2 mL; Refill: 11  - buprenorphine HCl-naloxone HCl (SUBOXONE) 8-2 MG per film; Place 1 Film under the tongue 2 times daily  Dispense: 20 Film; Refill: 0  - HCG qualitative urine  - Fentanyl Urine, Qualitative     2. Uncomplicated alcohol dependence (H)  She denies EtOH use since last visit.       3. Cocaine abuse (H)  Continues to use cocaine a few times per week.  Very guarded.  We will continue to monitor/discuss.       4. Bipolar II disorder (H)  She reports mood is \"OK.\"  Not currently taking medications and declines referral to psychiatry.  Will monitor.       5. Anaphylactic shock due to food, subsequent encounter  Noted to have history of anaphylaxis, she does not have Epi pen so script sent to pharmacy.  - EPINEPHrine (ANY BX GENERIC EQUIV) 0.3 MG/0.3ML injection 2-pack; Inject 0.3 mLs (0.3 mg) into the muscle once as needed for anaphylaxis  Dispense: 0.6 mL; Refill: 1     She will see me back in 1 week, sooner if needed.          Today, pt states she ran out of buprenorphine approximately one week before this visit.  She did return to use of heroin, last use 2/6/22.  She presented to ED earlier on date of this visit and was given 4mg buprenorphine which she tolerated well, and noted some improvement in withdrawal symptoms. She states she has difficulty getting to her appointments due to being without permanent residence.  She states she has resources for shelters. She endorses drinking alcohol and smoking cannabis.    [place] Prescription Drug Monitoring Program Reviewed:  Yes; as expected.        Medications:  buprenorphine HCl-naloxone HCl (SUBOXONE) 8-2 MG per film, Place 1 Film under the tongue 2 times daily  EPINEPHrine " (ANY BX GENERIC EQUIV) 0.3 MG/0.3ML injection 2-pack, Inject 0.3 mLs (0.3 mg) into the muscle once as needed for anaphylaxis  naloxone (NARCAN) 4 MG/0.1ML nasal spray, Spray 1 spray (4 mg) into one nostril alternating nostrils once as needed for opioid reversal every 2-3 minutes until assistance arrives    [COMPLETED] ondansetron (ZOFRAN-ODT) ODT tab 4 mg  [COMPLETED] ondansetron (ZOFRAN-ODT) ODT tab 4 mg        Allergies   Allergen Reactions     Tomato Anaphylaxis     Compazine [Prochlorperazine]      Lisinopril Angioedema       PMH, PSH, FamHx reviewed    Social History  Housing status: homeless   Employment status: Unemployed, not seeking work  Relationship status: Partnered  Children: no children    OBJECTIVE                                                      LMP 02/01/2022     Physical Exam  Vitals and nursing note reviewed.   Constitutional:       General: She is not in acute distress (mild).     Comments: Appears tired, eyes remain closed for portions of visit   HENT:      Head: Normocephalic and atraumatic.   Eyes:      General: No scleral icterus.     Conjunctiva/sclera: Conjunctivae normal.   Cardiovascular:      Rate and Rhythm: Tachycardia present.   Pulmonary:      Effort: Pulmonary effort is normal.   Skin:     General: Skin is warm.   Neurological:      General: No focal deficit present.      Mental Status: She is oriented to person, place, and time.      Coordination: Coordination is intact.      Gait: Gait is intact.   Psychiatric:         Attention and Perception: Attention normal.         Mood and Affect: Mood is depressed. Affect is flat.         Speech: Speech normal.         Behavior: Behavior normal. Behavior is cooperative.         Thought Content: Thought content does not include suicidal ideation.      Comments: Insight/judgment fair; very guarded          Labs:    UDS 2/8/22: buprenorphine and cocaine  *POC urine drug screen does not screen for Fentanyl    No results found for this or  any previous visit (from the past 240 hour(s)).    Patient counseling completed:  Discussed mechanism of action, potential risks/benefits/side effects of medications and other recommendations above.  Recommend pt keep naloxone in their possession and reviewed other aspects of harm reduction to reduce risk of overdose with return to use.   Recommended avoiding concurrent use of alcohol, benzodiazepines or other sedatives with buprenorphine or other opioids.  Discussed importance of avoiding isolation, building a network of supportive relationships, avoiding people/places/things associated with past use to reduce risk of relapse; including motivational interviewing regarding psychosocial treatment for addiction.         At least 30 min spent in review of medical record,  review, obtaining histories, discussing recommendations      Ericka Thomason MD  [facility]  [address] [place] [id number] [phone]

## 2022-02-08 NOTE — ED PROVIDER NOTES
"    [facility] EMERGENCY DEPARTMENT ([facility])       22  History     Chief Complaint   Patient presents with     Medication Refill     Patient missed her appoinment yesterday, needing refill for subaxone     The history is provided by the patient and medical records.   Medication Refill    Elaina Lance is a 41 year old female with a past medical history significant for bipolar disorder, opiate use disorder, alcohol use disorder, chronic pancreatitis who presents to the Emergency Department due to needing a refill on her Suboxone prescription.  Patient reports she ran out of her Suboxone prescription approximately 1 week ago.  She reports that since she has run out, she did use heroin over the weekend.  She reports she last used roughly 48 hours ago.  Patient reports she used the heroin intranasally.  Here in the ED, she does feel as if she is withdrawing and endorses symptoms such as feeling achy, sweaty hot and cold, nausea, and diarrhea.  Patient reports she did vomit earlier this morning.  Patient reports her previous Suboxone prescription was for 8 mg in the morning and 8 mg at night.    Past Medical History  Past Medical History:   Diagnosis Date     Anxiety      Depressive disorder      Genital herpes 2012     IMO update changed this record. Please review for accuracy     H/O: substance abuse (H) 2013     Hypertension      Postpartum depression 10/21/2011     Recur major depress, severe 2012     Substance abuse (H)      Past Surgical History:   Procedure Laterality Date      SECTION  2011 and 13      SECTION      three, most recently 16     CHOLECYSTECTOMY  2017     HERNIA REPAIR       HERNIA REPAIR      \"as a child\"     REPAIR TENDON HAND       buprenorphine HCl-naloxone HCl (SUBOXONE) 8-2 MG per film  EPINEPHrine (ANY BX GENERIC EQUIV) 0.3 MG/0.3ML injection 2-pack  naloxone (NARCAN) 4 MG/0.1ML nasal spray      Allergies   Allergen Reactions     " Tomato Anaphylaxis     Compazine [Prochlorperazine]      Lisinopril Angioedema     Family History  Family History   Problem Relation Age of Onset     Asthma Mother      Diabetes Mother      Allergies Mother      Psychotic Disorder Mother         schitzophrenic     Diabetes Maternal Grandmother      Heart Disease Maternal Grandmother         triple bypass     Eye Disorder Maternal Grandmother         cataracts     Hypertension Maternal Grandmother      Unknown/Adopted Maternal Grandfather      Unknown/Adopted Paternal Grandmother      Unknown/Adopted Paternal Grandfather      Cancer Sister         leukemia     Social History   Social History     Tobacco Use     Smoking status: Former Smoker     Packs/day: 0.50     Types: Cigarettes     Smokeless tobacco: Never Used   Substance Use Topics     Alcohol use: Yes     Comment: last alcohol intake 12/8/21     Drug use: Not Currently     Types: Opiates     Comment: snorted heroin      Past medical history, past surgical history, medications, allergies, family history, and social history were reviewed with the patient. No additional pertinent items.     I have reviewed the Medications, Allergies, Past Medical and Surgical History, and Social History in the Epic system.    Review of Systems   Constitutional: Positive for chills and diaphoresis.   Gastrointestinal: Positive for diarrhea, nausea and vomiting.   Musculoskeletal: Positive for myalgias.   All other systems reviewed and are negative.      Physical Exam   Pulse: 86  Temp: 98.5  F (36.9  C)  Resp: 16  Weight: 76.2 kg (168 lb)  SpO2: 100 %      Physical Exam  Vitals and nursing note reviewed.   Constitutional:       General: She is not in acute distress.     Appearance: She is well-developed. She is not ill-appearing, toxic-appearing or diaphoretic.      Comments: Mildly anxious   HENT:      Head: Normocephalic and atraumatic.   Eyes:      Comments: Pupils 2mm equal reactive   Cardiovascular:      Rate and Rhythm:  Normal rate and regular rhythm.      Heart sounds: Normal heart sounds.   Pulmonary:      Effort: Pulmonary effort is normal. No respiratory distress.      Breath sounds: Normal breath sounds.   Abdominal:      General: There is no distension.      Palpations: Abdomen is soft.      Tenderness: There is no abdominal tenderness (mild diffuse pain). There is no rebound.   Musculoskeletal:         General: No swelling or tenderness.      Cervical back: Normal range of motion.   Skin:     General: Skin is warm and dry.   Neurological:      General: No focal deficit present.      Mental Status: She is alert and oriented to person, place, and time.   Psychiatric:         Behavior: Behavior normal.         Thought Content: Thought content normal.         ED Course     At 10:51 AM the patient was seen and examined by Nancy Mora MD in Room EDHW03.     Procedures         The medical record was reviewed and interpreted.  Current labs reviewed and interpreted.      No results found for this or any previous visit (from the past 24 hour(s)).  Medications - No data to display       Assessments & Plan (with Medical Decision Making)   Patient is a 41-year-old female who presents to the ER due to opioid withdrawal symptoms.  Patient most recently used heroin this past weekend.  Patient was on Suboxone last week but ran out of it.  Patient follows here in the recovery clinic.  Patient was given 1 dose of her Suboxone here in the ER.  The recovery clinic opens up in the next 1 and half hour.  Plan will be for her to be sent to there so she can get her medications refilled from the clinic.  Patient was given some symptomatic management with some Zofran.  Patient's cow score was between 8 and 9.  Patient with no other acute issues.  Patient stable for discharge.    I have reviewed the nursing notes.    I have reviewed the findings, diagnosis, plan and need for follow up with the patient.    New Prescriptions    No medications  on file       Final diagnoses:   Opioid abuse (H)       IHarrison am serving as a trained medical scribe to document services personally performed by Nancy Mora MD, based on the provider's statements to me.      Nancy TUBBS MD, was physically present and have reviewed and verified the accuracy of this note documented by Harrison Joshua.     Nancy Mora MD  2/8/2022   [facility] EMERGENCY DEPARTMENT     Nancy Mora MD  02/08/22 1155

## 2022-02-19 ENCOUNTER — HEALTH MAINTENANCE LETTER (OUTPATIENT)
Age: 42
End: 2022-02-19

## 2022-03-16 ENCOUNTER — TELEPHONE (OUTPATIENT)
Dept: BEHAVIORAL HEALTH | Facility: CLINIC | Age: 42
End: 2022-03-16

## 2022-03-16 ENCOUNTER — OFFICE VISIT (OUTPATIENT)
Dept: BEHAVIORAL HEALTH | Facility: CLINIC | Age: 42
End: 2022-03-16
Payer: COMMERCIAL

## 2022-03-16 VITALS — HEART RATE: 79 BPM | DIASTOLIC BLOOD PRESSURE: 120 MMHG | SYSTOLIC BLOOD PRESSURE: 167 MMHG

## 2022-03-16 DIAGNOSIS — F15.90 STIMULANT USE DISORDER: ICD-10-CM

## 2022-03-16 DIAGNOSIS — F10.20 ALCOHOL USE DISORDER, SEVERE, DEPENDENCE (H): ICD-10-CM

## 2022-03-16 DIAGNOSIS — F11.20 OPIOID USE DISORDER, SEVERE, DEPENDENCE (H): Primary | ICD-10-CM

## 2022-03-16 LAB — HCG UR QL: NEGATIVE

## 2022-03-16 PROCEDURE — 81025 URINE PREGNANCY TEST: CPT | Performed by: FAMILY MEDICINE

## 2022-03-16 PROCEDURE — 99214 OFFICE O/P EST MOD 30 MIN: CPT | Performed by: FAMILY MEDICINE

## 2022-03-16 RX ORDER — BUPRENORPHINE AND NALOXONE 8; 2 MG/1; MG/1
1 FILM, SOLUBLE BUCCAL; SUBLINGUAL 2 TIMES DAILY
Qty: 30 FILM | Refills: 0 | Status: SHIPPED | OUTPATIENT
Start: 2022-03-16 | End: 2022-05-09

## 2022-03-16 NOTE — PROGRESS NOTES
[facility]    Peer  met with Elaina Lance in the Recovery Clinic to introduce himself, detail services provided and discuss current status of recovery. Pt appeared alert, oriented and open to feedback during our discussion.     Pt states struggling today due to withdrawal symptoms, and is visibly noted to be so. PRC commends pt on making hte choice to return to the  to see provider for suboxone assisted treatment.     PRC welcomes contact from the pt for recovery support and resources. PRC and pt agree to speak during an upcoming  visit.     Patient Goal: To utilize suboxone assisted treatment for sobriety and long term recovery.     Goal Progress:   Ongoing.    Key Risk Factors to Recovery:   PRC encourages being aware of risk factors that can lead to re-use which include avoiding isolation, avoiding triggers and managing cravings in a healthy manner. being open and willing to acceptance and change on a daily basis.  PRC encourages pt to establish a sober network calling tree to reach out to when needed.  Continue to practice honesty with ourselves and trusted support person(s).   PRC encourages regular attendance at recovery based meetings as well as finding a sponsor for mentoring and accountability.   PRC encourages consideration of of other services such as counseling for mental health issues which can correlate with our substance use.      Support Needs:   Ongoing care, support and resources for opioid substance use disorder.     Follow up:   PRC provided pt with his contact information for support and resource needs.  PRC and pt agree to meet during an upcoming  appointment.       [place] [address], [address]   [place], [place], [address]  Clinic Phone: [phone]  Clinic Fax: [phone]  Peer  phone: [phone]    Open Monday - Friday  9:00am-4:00pm  Walk in hours: 9am-3pm      Tim Subramanian  March 16,  2022  10:52 AM

## 2022-03-16 NOTE — TELEPHONE ENCOUNTER
Patient was unable to  Suboxone rx from Dr. Thomason from pharmacy because per pharmacy she is restricted to Dr. Garg. Patient has filled rxs from Dr. Thomason in the past.     Phone call to [facility] Restricted Recipient program, [phone]. Per [facility] Restricted Recipient Care Coordinator, Dr. Thomason will be added as an authorized prescriber in addition to Dr. Garg, and patient should be able to  Suboxone rx in 1-2 hours. Patient notified.    Kiana Richard RN on 3/16/2022 at 11:26 AM

## 2022-03-16 NOTE — NURSING NOTE
[facility]      Rooming information:  Approximate last use of illicit opioids: 3/14/22  Taking buprenorphine? No As prescribed? No  Number of buprenorphine films/tablets remaining currently: 0  Narcan currently available: Yes  Other recent substance use:    Alcohol  and Cocaine   NICOTINE-Yes:   If using nicotine, ready to quit? No    Point of care urine drug screen positive for: cocaine  *POC urine drug screen does not screen for Fentanyl    Pregnancy Status   LMP: 3/1/2022  Birth control/barriers: denies  Interested in birth control if none currently? No  Urine pregnancy test specimen obtained and sent to lab:yes    PHQ-2 Score:     PHQ-2 ( 1999 Pfizer) 3/16/2022 2/8/2022   Q1: Little interest or pleasure in doing things 3 3   Q2: Feeling down, depressed or hopeless 3 3   PHQ-2 Score 6 6        If PHQ-2 score of 3 or higher, has Recovery Clinic therapist or provider been notified? Yes    Any current suicidal ideation? No  If yes, has Recovery Clinic therapist or provider been notified? N/A    Primary care provider: Vidya Russell PA-C     Mental health provider: denies (follow up on MH referral if needed)    Insurance needs: active    Housing needs: stable    Contact information up to date? yes    3rd Party Involvement none today (please obtain OLYA if pt would like to include)    Stacey Shore CMA  March 16, 2022  9:52 AM

## 2022-03-16 NOTE — PROGRESS NOTES
[facility] Recovery Clinic Follow Up    ASSESSMENT/PLAN                                                      1. Opioid use disorder, severe, dependence (H)  Pt reporting last use of fentanyl 3/14/22, endorsing withdrawal symptoms currently.   Resume buprenorphine and titrate to previously effective dose 16mg/day  Additional naloxone provided  Pt declined discussion regarding psychosocial treatment for addiction today  Pt stated she would not be able to return in primary recommendation to return in one week, but stated she would be able to return in 2 weeks.    - buprenorphine HCl-naloxone HCl (SUBOXONE) 8-2 MG per film; Place 1 Film under the tongue 2 times daily  Dispense: 30 Film; Refill: 0  - naloxone (NARCAN) 4 MG/0.1ML nasal spray; Spray 1 spray (4 mg) into one nostril alternating nostrils once as needed for opioid reversal every 2-3 minutes until assistance arrives  Dispense: 0.2 mL; Refill: 11  - HCG qualitative urine    2. Alcohol use disorder, severe, dependence (H)  Pt was not interested in discussing recommendations for detox or other treatments today    3. Stimulant use disorder  Pt was not interested in discussing psychosocial treatments today    Return in about 2 weeks (around 3/30/2022) for Follow up, in person.      SUBJECTIVE                                                          CC/HPI:  Elaina Lance is a 41 year old female with PMHx of possible seizure 2018, DM2, bipolar 1, PTSD, alcohol use disorder, stimulant use disorder, and opioid use disorder on buprenorphine who presents to the Recovery Clinic for follow up.     First Recovery Clinic visit: 12/10/21    Last date of illicit opioid use: 2/1/22    Brief history of use:  Began using opioids at age 26.  Has used intermittently, on buprenorphine inconsistently.      IV drug use: No   History of overdose: Yes: multiple  Previous MAT:  Yes (methadone and Suboxone - on for several years)  Previous treatments : Yes:  (most recently at  "[facility])  Longest period of sobriety: 4 years   Medical complications related to substance use: denies  Hepatitis C Status:  Non-reactive (3/2020)  HIV Status:  Negative (10/25/21)      Most recent Recovery Clinic visit: 1/17/22  A/P last visit:  1. Opioid use disorder, severe, dependence (H)  She reports that she has been purchasing buprenorphine on street and taking 8mg BID with good relief of symptoms.  We can continue this dose (reviewed risks of precipitated withdrawal).  Reviewed importance of avoid other sedating substances such as EtOH, benzos, and other opioids.  Script for narcan provided.    - HCG qualitative urine; Standing  - Fentanyl Urine, Qualitative; Standing  - naloxone (NARCAN) 4 MG/0.1ML nasal spray; Spray 1 spray (4 mg) into one nostril alternating nostrils once as needed for opioid reversal every 2-3 minutes until assistance arrives  Dispense: 0.2 mL; Refill: 11  - buprenorphine HCl-naloxone HCl (SUBOXONE) 8-2 MG per film; Place 1 Film under the tongue 2 times daily  Dispense: 20 Film; Refill: 0  - HCG qualitative urine  - Fentanyl Urine, Qualitative     2. Uncomplicated alcohol dependence (H)  She denies EtOH use since last visit.       3. Cocaine abuse (H)  Continues to use cocaine a few times per week.  Very guarded.  We will continue to monitor/discuss.       4. Bipolar II disorder (H)  She reports mood is \"OK.\"  Not currently taking medications and declines referral to psychiatry.  Will monitor.       5. Anaphylactic shock due to food, subsequent encounter  Noted to have history of anaphylaxis, she does not have Epi pen so script sent to pharmacy.  - EPINEPHrine (ANY BX GENERIC EQUIV) 0.3 MG/0.3ML injection 2-pack; Inject 0.3 mLs (0.3 mg) into the muscle once as needed for anaphylaxis  Dispense: 0.6 mL; Refill: 1     She will see me back in 1 week, sooner if needed.          Today, pt states she is not doing well.  She is without stable housing, and states she is in an " "environment in which everyone is using.  She describes using heroin/fentanyl \"a couple of times\" recently, last use 3/14/22.  She states she has mainly been smoking crack cocaine, using intranasal cocaine, and drinking alcohol.  She cannot quantify how much alcohol she is drinking \"because the days run together,\" and states her last use of alcohol was 2 days ago.      Pt is currently experiencing opioid withdrawal symptoms of body aches, increased irritability, chills.  She wants to resume buprenorphine. She is not interested in talking about additional treatments for addiction or shelter options today.      [place] Prescription Drug Monitoring Program Reviewed:  Yes; as expected.        Medications:  buprenorphine HCl-naloxone HCl (SUBOXONE) 8-2 MG per film, Place 1 Film under the tongue 2 times daily (Patient not taking: Reported on 3/16/2022)  EPINEPHrine (ANY BX GENERIC EQUIV) 0.3 MG/0.3ML injection 2-pack, Inject 0.3 mLs (0.3 mg) into the muscle once as needed for anaphylaxis (Patient not taking: Reported on 3/16/2022)  naloxone (NARCAN) 4 MG/0.1ML nasal spray, Spray 1 spray (4 mg) into one nostril alternating nostrils once as needed for opioid reversal every 2-3 minutes until assistance arrives (Patient not taking: Reported on 3/16/2022)    No current facility-administered medications on file prior to visit.      Allergies   Allergen Reactions     Tomato Anaphylaxis     Compazine [Prochlorperazine]      Lisinopril Angioedema       PMH, PSH, FamHx reviewed    Social History  Housing status: homeless   Employment status: Unemployed, not seeking work  Relationship status: Partnered  Children: no children    OBJECTIVE                                                      BP (!) 167/120   Pulse 79   LMP 03/01/2022     Physical Exam  Vitals and nursing note reviewed.   Constitutional:       Comments: Appears uncomfortable related to withdrawal   HENT:      Head: Normocephalic and atraumatic.   Eyes:      General: " No scleral icterus.     Conjunctiva/sclera: Conjunctivae normal.   Pulmonary:      Effort: Pulmonary effort is normal.   Skin:     General: Skin is warm.   Neurological:      General: No focal deficit present.      Mental Status: She is oriented to person, place, and time.      Coordination: Coordination is intact.      Gait: Gait is intact.   Psychiatric:         Attention and Perception: Attention normal.         Mood and Affect: Mood is depressed. Affect is flat.         Speech: Speech normal.         Behavior: Behavior normal. Behavior is cooperative.         Thought Content: Thought content does not include suicidal ideation.      Comments: Insight/judgment fair; very guarded          Labs:    UDS 2/8/22: buprenorphine and cocaine  *POC urine drug screen does not screen for Fentanyl    No results found for this or any previous visit (from the past 240 hour(s)).    Patient counseling completed:  Discussed mechanism of action, potential risks/benefits/side effects of medications and other recommendations above.  Recommend pt keep naloxone in their possession and reviewed other aspects of harm reduction to reduce risk of overdose with return to use.   Recommended avoiding concurrent use of alcohol, benzodiazepines or other sedatives with buprenorphine or other opioids.  Discussed importance of avoiding isolation, building a network of supportive relationships, avoiding people/places/things associated with past use to reduce risk of relapse; including motivational interviewing regarding psychosocial treatment for addiction.         At least 30 min spent in review of medical record,  review, obtaining histories, discussing recommendations      Ericka Thomason MD  [place] [id number] [place] [address] [id number] [phone]

## 2022-05-08 ENCOUNTER — HOSPITAL ENCOUNTER (EMERGENCY)
Facility: CLINIC | Age: 42
Discharge: HOME | End: 2022-05-08
Attending: FAMILY MEDICINE | Admitting: FAMILY MEDICINE
Payer: COMMERCIAL

## 2022-05-08 VITALS
HEART RATE: 72 BPM | SYSTOLIC BLOOD PRESSURE: 162 MMHG | DIASTOLIC BLOOD PRESSURE: 89 MMHG | OXYGEN SATURATION: 100 % | TEMPERATURE: 98.6 F | RESPIRATION RATE: 18 BRPM

## 2022-05-08 DIAGNOSIS — F11.23 OPIOID DEPENDENCE WITH WITHDRAWAL (H): ICD-10-CM

## 2022-05-08 PROCEDURE — 99284 EMERGENCY DEPT VISIT MOD MDM: CPT | Performed by: FAMILY MEDICINE

## 2022-05-08 PROCEDURE — 99283 EMERGENCY DEPT VISIT LOW MDM: CPT | Performed by: FAMILY MEDICINE

## 2022-05-08 PROCEDURE — 250N000013 HC RX MED GY IP 250 OP 250 PS 637: Performed by: FAMILY MEDICINE

## 2022-05-08 RX ORDER — BUPRENORPHINE AND NALOXONE 8; 2 MG/1; MG/1
1 FILM, SOLUBLE BUCCAL; SUBLINGUAL ONCE
Status: COMPLETED | OUTPATIENT
Start: 2022-05-08 | End: 2022-05-08

## 2022-05-08 RX ORDER — BUPRENORPHINE AND NALOXONE 8; 2 MG/1; MG/1
FILM, SOLUBLE BUCCAL; SUBLINGUAL
Qty: 2 FILM | Refills: 0 | Status: SHIPPED | OUTPATIENT
Start: 2022-05-08 | End: 2022-05-09

## 2022-05-08 RX ADMIN — BUPRENORPHINE AND NALOXONE 1 FILM: 8; 2 FILM BUCCAL; SUBLINGUAL at 11:37

## 2022-05-08 NOTE — ED NOTES
"Pt caught writer in [place] and states that she has had numbness and tingling in BL fingers for \"a while now\". Ice chips provided.   "

## 2022-05-08 NOTE — DISCHARGE INSTRUCTIONS
Discharge from the emergency room with 2 more doses of Suboxone to be used tonight and tomorrow morning prior to being seen in the recovery clinic for further prescription.

## 2022-05-08 NOTE — ED NOTES
Bed: HW03  Expected date: 5/8/22  Expected time: 11:05 AM  Means of arrival:   Comments:  Hen [address] 41F withdrawal sx

## 2022-05-08 NOTE — ED TRIAGE NOTES
"Pt is 40 yo F [facility] activated by her son for worsening opiate w/d. She states she ran out of suboxone medication. She reports she is rx'ed 8mg suboxone BID. She states last does of suboxone was Thursday. She endorses w/d sxs of water eyes, body aches, diarrhea, \"feeling really bad.\" She appears in no acute distress. Pt oriented to ED evaluation process. Safety screen completed, belongings remain with patient. She denies other complaitns, denies any SI/HI, AH/VH, denies other medical issues.     Triage Assessment     Row Name 05/08/22 1125       Triage Assessment (Adult)    Airway WDL WDL       Respiratory WDL    Respiratory WDL WDL       Skin Circulation/Temperature WDL    Skin Circulation/Temperature WDL WDL       Cardiac WDL    Cardiac WDL WDL       Peripheral/Neurovascular WDL    Peripheral Neurovascular WDL WDL       Cognitive/Neuro/Behavioral WDL    Cognitive/Neuro/Behavioral WDL WDL              "

## 2022-05-09 ENCOUNTER — TELEPHONE (OUTPATIENT)
Dept: BEHAVIORAL HEALTH | Facility: CLINIC | Age: 42
End: 2022-05-09
Payer: COMMERCIAL

## 2022-05-09 ENCOUNTER — OFFICE VISIT (OUTPATIENT)
Dept: BEHAVIORAL HEALTH | Facility: CLINIC | Age: 42
End: 2022-05-09
Payer: COMMERCIAL

## 2022-05-09 VITALS — HEART RATE: 80 BPM | SYSTOLIC BLOOD PRESSURE: 163 MMHG | DIASTOLIC BLOOD PRESSURE: 118 MMHG

## 2022-05-09 DIAGNOSIS — F11.20 OPIOID USE DISORDER, SEVERE, DEPENDENCE (H): Primary | ICD-10-CM

## 2022-05-09 DIAGNOSIS — F15.90 STIMULANT USE DISORDER: ICD-10-CM

## 2022-05-09 DIAGNOSIS — F10.20 ALCOHOL USE DISORDER, SEVERE, DEPENDENCE (H): ICD-10-CM

## 2022-05-09 LAB — HCG UR QL: NEGATIVE

## 2022-05-09 PROCEDURE — 99214 OFFICE O/P EST MOD 30 MIN: CPT | Performed by: FAMILY MEDICINE

## 2022-05-09 PROCEDURE — 81025 URINE PREGNANCY TEST: CPT | Performed by: FAMILY MEDICINE

## 2022-05-09 RX ORDER — BUPRENORPHINE AND NALOXONE 8; 2 MG/1; MG/1
1 FILM, SOLUBLE BUCCAL; SUBLINGUAL 2 TIMES DAILY
Qty: 30 FILM | Refills: 0 | Status: SHIPPED | OUTPATIENT
Start: 2022-05-09

## 2022-05-09 NOTE — PROGRESS NOTES
"[facility] - Recovery Clinic Follow Up    ASSESSMENT/PLAN                                                      1. Opioid use disorder, severe, dependence (H)  Continue buprenorphine 16mg/day which was restarted 5/8/22; last use of other opioid 5/5/22.    Naloxone rx provided  Pt declines psychosocial treatment for addiction including scheduling with therapist in .    - buprenorphine HCl-naloxone HCl (SUBOXONE) 8-2 MG per film; Place 1 Film under the tongue 2 times daily  Dispense: 30 Film; Refill: 0  - naloxone (NARCAN) 4 MG/0.1ML nasal spray; Spray 1 spray (4 mg) into one nostril alternating nostrils once as needed for opioid reversal every 2-3 minutes until assistance arrives  Dispense: 0.2 mL; Refill: 11  - HCG qualitative urine    2. Alcohol use disorder, severe, dependence (H)  Evaluate extent of pt's symptoms again at follow-up visit.  She offered today she was drinking \"not that much\" and denies cravings.  Discussed that medications could be prescribed to reduce cravings which pt declined today.      3. Stimulant use disorder  Reevaluate symptoms and discussion regarding potential medical therapies next visit.     Return in about 2 weeks (around 5/23/2022) for Follow up, in person.      SUBJECTIVE                                                        CC/HPI:  Elaina Lance is a 41 year old female with PMHx of alcohol withdrawal seizures, DM2, bipolar 1, PTSD, alcohol use disorder, stimulant use disorder, and opioid use disorder on buprenorphine who presents to the Recovery Clinic for follow up.     First Recovery Clinic visit: 12/10/21    Last date of illicit opioid use: 2/1/22    Brief history of use:  Began using opioids at age 26.  Has used intermittently, on buprenorphine inconsistently.      IV drug use: No   History of overdose: Yes: multiple  Previous MAT:  Yes (methadone and Suboxone - on for several years)  Previous treatments : Yes:  (most recently at [facility])  Longest " "period of sobriety: 4 years   Medical complications related to substance use: denies  Hepatitis C Status:  Non-reactive (3/2020)  HIV Status:  Negative (10/25/21)      Most recent Recovery Clinic visit: 3/16/22  A/P last visit:  1. Opioid use disorder, severe, dependence (H)  Pt reporting last use of fentanyl 3/14/22, endorsing withdrawal symptoms currently.   Resume buprenorphine and titrate to previously effective dose 16mg/day  Additional naloxone provided  Pt declined discussion regarding psychosocial treatment for addiction today  Pt stated she would not be able to return in primary recommendation to return in one week, but stated she would be able to return in 2 weeks.    - buprenorphine HCl-naloxone HCl (SUBOXONE) 8-2 MG per film; Place 1 Film under the tongue 2 times daily  Dispense: 30 Film; Refill: 0  - naloxone (NARCAN) 4 MG/0.1ML nasal spray; Spray 1 spray (4 mg) into one nostril alternating nostrils once as needed for opioid reversal every 2-3 minutes until assistance arrives  Dispense: 0.2 mL; Refill: 11  - HCG qualitative urine     2. Alcohol use disorder, severe, dependence (H)  Pt was not interested in discussing recommendations for detox or other treatments today     3. Stimulant use disorder  Pt was not interested in discussing psychosocial treatments today     Return in about 2 weeks (around 3/30/2022) for Follow up, in person.      5/9/22:  Pt returns today requesting to continue buprenorphine that she restarted during ED visit on 5/8/22.  She last used other opioids 5/5/22.  She was given 8mg buprenorphine in ED and rx for 2 additional 8mg doses which she has taken without difficulty.  She reports control of withdrawal symptoms with current therapy.  Pt reports some alcohol use, \"not a lot,\" some cocaine use, last use 5/5/22.  Pt moved in with her son ~2 weeks before this visit and is very happy about this change.  She states her son works and does not use.        [place] Prescription Drug " Monitoring Program Reviewed:  Yes; as expected.        Medications:  buprenorphine HCl-naloxone HCl (SUBOXONE) 8-2 MG per film, May use 1 film tonight and 1 film in the morning prior to being seen in the recovery clinic (Patient not taking: Reported on 5/9/2022)  buprenorphine HCl-naloxone HCl (SUBOXONE) 8-2 MG per film, Place 1 Film under the tongue 2 times daily (Patient not taking: Reported on 5/9/2022)  EPINEPHrine (ANY BX GENERIC EQUIV) 0.3 MG/0.3ML injection 2-pack, Inject 0.3 mLs (0.3 mg) into the muscle once as needed for anaphylaxis (Patient not taking: No sig reported)  naloxone (NARCAN) 4 MG/0.1ML nasal spray, Spray 1 spray (4 mg) into one nostril alternating nostrils once as needed for opioid reversal every 2-3 minutes until assistance arrives (Patient not taking: Reported on 5/9/2022)    No current facility-administered medications on file prior to visit.      Allergies   Allergen Reactions     Tomato Anaphylaxis     Compazine [Prochlorperazine]      Lisinopril Angioedema       PMH, PSH, FamHx reviewed    Social History  Housing status: with her son since ~4/25/22   Employment status: Unemployed, not seeking work  Relationship status: Partnered  Children: no children    OBJECTIVE                                                      BP (!) 163/118   Pulse 80     Physical Exam  Vitals and nursing note reviewed.   Constitutional:       General: She is not in acute distress.  HENT:      Head: Normocephalic and atraumatic.   Eyes:      General: No scleral icterus.     Conjunctiva/sclera: Conjunctivae normal.   Pulmonary:      Effort: Pulmonary effort is normal.   Skin:     General: Skin is warm.   Neurological:      General: No focal deficit present.      Mental Status: She is alert and oriented to person, place, and time.      Coordination: Coordination is intact.      Gait: Gait is intact.   Psychiatric:         Attention and Perception: Attention normal.         Mood and Affect: Mood is depressed. Affect  is not inappropriate.         Speech: Speech normal.         Behavior: Behavior normal. Behavior is cooperative.         Thought Content: Thought content does not include suicidal ideation.      Comments: Insight/judgment fair; very guarded          Labs:    UDS 5/9/22: buprenorphine and cocaine  *POC urine drug screen does not screen for Fentanyl    No results found for this or any previous visit (from the past 240 hour(s)).    Patient counseling completed:  Discussed mechanism of action, potential risks/benefits/side effects of medications and other recommendations above.  Recommend pt keep naloxone in their possession and reviewed other aspects of harm reduction to reduce risk of overdose with return to use.   Recommended avoiding concurrent use of alcohol, benzodiazepines or other sedatives with buprenorphine or other opioids.  Discussed importance of avoiding isolation, building a network of supportive relationships, avoiding people/places/things associated with past use to reduce risk of relapse; including motivational interviewing regarding psychosocial treatment for addiction.         At least 30 min spent in review of medical record,  review, obtaining histories, discussing recommendations      Ericka Thomason MD  [place] [id number] [place] [address] [id number] [phone]

## 2022-05-09 NOTE — TELEPHONE ENCOUNTER
No phone number on file.    [facility] message sent to patient reminding her of Recovery Clinic walk-in hours.   Patient was last seen in the Recovery Clinic 3/16/22.    [facility]  [facility] [address], [address]   [place], [place], [address]  Phone: [phone]  Fax: [phone]    Open Monday-Friday  9:00am-4:00pm  Walk in hours: 9am-3pm    Kiana Richard RN on 5/9/2022 at 12:44 PM

## 2022-05-09 NOTE — NURSING NOTE
[facility]      Rooming information:  Approximate last use of illicit opioids: 5/5/22  Taking buprenorphine? Yes:  As prescribed? No  Number of buprenorphine films/tablets remaining currently: 0  Side effects related to buprenorphine (constipation, dry mouth, sedation?) No   Narcan currently available: No, has refill  Other recent substance use:    Cocaine- last use was on 5/5/22  NICOTINE-Yes:   If using nicotine, ready to quit? No    Point of care urine drug screen positive for: buprenorphine and cocaine  *POC urine drug screen does not screen for Fentanyl    Pregnancy Status   LMP: week ago  Birth control/barriers: denies   Interested in birth control if none currently? No  Urine pregnancy test specimen obtained and sent to lab:yes    PHQ-2 Score:     PHQ-2 ( 1999 [employer]) 5/9/2022 3/16/2022   Q1: Little interest or pleasure in doing things 0 3   Q2: Feeling down, depressed or hopeless 0 3   PHQ-2 Score 0 6        If PHQ-2 score of 3 or higher, has Recovery Clinic therapist or provider been notified? N/A    Any current suicidal ideation? No  If yes, has Recovery Clinic therapist or provider been notified? N/A    Primary care provider: Vidya Russell PA-C     Mental health provider: denies (follow up on MH referral if needed)    Insurance needs: active    Housing needs: homeless    Contact information up to date? yes    3rd Party Involvement none today (please obtain OLYA if pt would like to include)    Stacey Shore CMA  May 9, 2022  2:08 PM

## 2022-05-10 NOTE — ED PROVIDER NOTES
"ED Provider Note  [facility]      History     Chief Complaint   Patient presents with     Medication Refill     Withdrawal     opiates     HPI  Elaina Lance is a 41 year old female who presents emergency room with concerns that she is not taking her Suboxone.  Patient states that she is normally followed in the recovery clinic and unfortunately missed her appointment and has been off Suboxone since Thursday she states that she is having significant withdrawal and was hoping that she could get dosed with Suboxone here.  Patient is normally taking 8/2 twice daily.  She denies any other acute injuries or concerns.    Past Medical History  Past Medical History:   Diagnosis Date     Anxiety      Depressive disorder      Genital herpes 2012     IMO update changed this record. Please review for accuracy     H/O: substance abuse (H) 2013     Hypertension      Postpartum depression 10/21/2011     Recur major depress, severe 2012     Substance abuse (H)      Past Surgical History:   Procedure Laterality Date      SECTION  2011 and 13      SECTION      three, most recently 16     CHOLECYSTECTOMY  2017     HERNIA REPAIR       HERNIA REPAIR      \"as a child\"     REPAIR TENDON HAND       buprenorphine HCl-naloxone HCl (SUBOXONE) 8-2 MG per film  EPINEPHrine (ANY BX GENERIC EQUIV) 0.3 MG/0.3ML injection 2-pack  naloxone (NARCAN) 4 MG/0.1ML nasal spray      Allergies   Allergen Reactions     Tomato Anaphylaxis     Compazine [Prochlorperazine]      Lisinopril Angioedema     Family History  Family History   Problem Relation Age of Onset     Asthma Mother      Diabetes Mother      Allergies Mother      Psychotic Disorder Mother         schitzophrenic     Diabetes Maternal Grandmother      Heart Disease Maternal Grandmother         triple bypass     Eye Disorder Maternal Grandmother         cataracts     Hypertension Maternal Grandmother      Unknown/Adopted " Maternal Grandfather      Unknown/Adopted Paternal Grandmother      Unknown/Adopted Paternal Grandfather      Cancer Sister         leukemia     Social History   Social History     Tobacco Use     Smoking status: Current Every Day Smoker     Packs/day: 0.50     Types: Cigarettes     Smokeless tobacco: Never Used   Substance Use Topics     Alcohol use: Yes     Comment: last alcohol intake 12/8/21     Drug use: Not Currently     Types: Opiates     Comment: snorted heroin      Past medical history, past surgical history, medications, allergies, family history, and social history were reviewed with the patient. No additional pertinent items.       Review of Systems  A complete review of systems was performed with pertinent positives and negatives noted in the HPI, and all other systems negative.    Physical Exam   BP: (!) 142/100  Pulse: 84  Temp: 99  F (37.2  C)  Resp: 18  SpO2: 99 %  Physical Exam  Constitutional:       General: She is not in acute distress.     Appearance: She is not diaphoretic.   HENT:      Head: Atraumatic.      Mouth/Throat:      Pharynx: No oropharyngeal exudate.   Eyes:      General: No scleral icterus.     Pupils: Pupils are equal, round, and reactive to light.   Cardiovascular:      Heart sounds: Normal heart sounds.   Pulmonary:      Effort: No respiratory distress.      Breath sounds: Normal breath sounds.   Abdominal:      General: Bowel sounds are normal.      Palpations: Abdomen is soft.      Tenderness: There is no abdominal tenderness.   Musculoskeletal:         General: No tenderness.   Skin:     General: Skin is warm.      Findings: No rash.   Neurological:      General: No focal deficit present.      Mental Status: She is oriented to person, place, and time.      Motor: No weakness.      Coordination: Coordination normal.   Psychiatric:         Mood and Affect: Mood is anxious.         Thought Content: Thought content does not include homicidal or suicidal ideation.         ED  Course      Procedures    The medical record was reviewed and interpreted.         Medications   buprenorphine HCl-naloxone HCl (SUBOXONE) 8-2 MG per film 1 Film (1 Film Sublingual Given 5/8/22 3290)        Assessments & Plan (with Medical Decision Making)       I have reviewed the nursing notes. I have reviewed the findings, diagnosis, plan and need for follow up with the patient.    Discharge Medication List as of 5/8/2022  1:48 PM      START taking these medications    Details   !! buprenorphine HCl-naloxone HCl (SUBOXONE) 8-2 MG per film May use 1 film tonight and 1 film in the morning prior to being seen in the recovery clinic, Disp-2 Film, R-0, Local Print       !! - Potential duplicate medications found. Please discuss with provider.        Patient with opiate dependence at this time requiring Suboxone she was given a dose of 8 2 here and will be given prescriptions for tonight and tomorrow morning so she can be seen in the clinic for further prescriptions.        Final diagnoses:   Opioid dependence with withdrawal (H)       --    [facility] EMERGENCY DEPARTMENT  5/8/2022     Albert Almonte MD  05/09/22 2012       Albert Almonte MD  05/13/22 1123

## 2022-07-20 PROCEDURE — 99283 EMERGENCY DEPT VISIT LOW MDM: CPT | Performed by: EMERGENCY MEDICINE

## 2022-07-20 PROCEDURE — 99284 EMERGENCY DEPT VISIT MOD MDM: CPT | Performed by: EMERGENCY MEDICINE

## 2022-07-21 ENCOUNTER — HOSPITAL ENCOUNTER (EMERGENCY)
Facility: CLINIC | Age: 42
Discharge: HOME | End: 2022-07-21
Attending: EMERGENCY MEDICINE | Admitting: EMERGENCY MEDICINE
Payer: COMMERCIAL

## 2022-07-21 VITALS
DIASTOLIC BLOOD PRESSURE: 112 MMHG | RESPIRATION RATE: 18 BRPM | OXYGEN SATURATION: 100 % | SYSTOLIC BLOOD PRESSURE: 153 MMHG | TEMPERATURE: 98.4 F | HEART RATE: 81 BPM

## 2022-07-21 DIAGNOSIS — F11.20 CONTINUOUS OPIOID DEPENDENCE (H): ICD-10-CM

## 2022-07-21 PROCEDURE — 250N000013 HC RX MED GY IP 250 OP 250 PS 637: Performed by: EMERGENCY MEDICINE

## 2022-07-21 RX ORDER — BUPRENORPHINE AND NALOXONE 8; 2 MG/1; MG/1
1 FILM, SOLUBLE BUCCAL; SUBLINGUAL DAILY
Status: DISCONTINUED | OUTPATIENT
Start: 2022-07-21 | End: 2022-07-21 | Stop reason: HOSPADM

## 2022-07-21 RX ADMIN — BUPRENORPHINE AND NALOXONE 1 FILM: 8; 2 FILM BUCCAL; SUBLINGUAL at 03:12

## 2022-07-21 ASSESSMENT — ENCOUNTER SYMPTOMS
FEVER: 0
VOMITING: 0
ABDOMINAL PAIN: 0
SHORTNESS OF BREATH: 0
NAUSEA: 0

## 2022-07-21 NOTE — ED PROVIDER NOTES
"ED Provider Note  [facility] EMERGENCY DEPARTMENT ([facility])    22          History     Chief Complaint   Patient presents with     Addiction Problem     Pt called EMS for detox, used heroin yesterday, requesting suboxone.     HPI  Elaina Lance is a 41 year old female with past medical history significant for alcohol withdrawal seizures, DM2, bipolar 1, PTSD, alcohol use disorder, stimulant use disorder, and opioid use disorder on buprenorphine who presents to the ED seeking Suboxone.  Patient states she last used heroin yesterday.  He has been a little over 12 hours since last use.  She has not been to the recovery clinic to get her Suboxone.  No nausea or vomiting.  No other acute complaints.  No suicidal ideation or self injury.    Past Medical History  Past Medical History:   Diagnosis Date     Anxiety      Depressive disorder      Genital herpes 2012     IMO update changed this record. Please review for accuracy     H/O: substance abuse (H) 2013     Hypertension      Postpartum depression 10/21/2011     Recur major depress, severe 2012     Substance abuse (H)      Past Surgical History:   Procedure Laterality Date      SECTION  2011 and 13      SECTION      three, most recently 16     CHOLECYSTECTOMY  2017     HERNIA REPAIR       HERNIA REPAIR      \"as a child\"     REPAIR TENDON HAND       buprenorphine HCl-naloxone HCl (SUBOXONE) 8-2 MG per film  EPINEPHrine (ANY BX GENERIC EQUIV) 0.3 MG/0.3ML injection 2-pack  naloxone (NARCAN) 4 MG/0.1ML nasal spray      Allergies   Allergen Reactions     Tomato Anaphylaxis     Compazine [Prochlorperazine]      Lisinopril Angioedema     Family History  Family History   Problem Relation Age of Onset     Asthma Mother      Diabetes Mother      Allergies Mother      Psychotic Disorder Mother         schitzophrenic     Diabetes Maternal Grandmother      Heart Disease " Maternal Grandmother         triple bypass     Eye Disorder Maternal Grandmother         cataracts     Hypertension Maternal Grandmother      Unknown/Adopted Maternal Grandfather      Unknown/Adopted Paternal Grandmother      Unknown/Adopted Paternal Grandfather      Cancer Sister         leukemia     Social History   Social History     Tobacco Use     Smoking status: Current Every Day Smoker     Packs/day: 0.50     Types: Cigarettes     Smokeless tobacco: Never Used   Substance Use Topics     Alcohol use: Yes     Comment: last alcohol intake 12/8/21     Drug use: Not Currently     Types: Opiates     Comment: snorted heroin      Past medical history, past surgical history, medications, allergies, family history, and social history were reviewed with the patient. No additional pertinent items.       Review of Systems   Constitutional: Negative for fever.   Respiratory: Negative for shortness of breath.    Cardiovascular: Negative for chest pain.   Gastrointestinal: Negative for abdominal pain, nausea and vomiting.   Psychiatric/Behavioral: Negative for self-injury and suicidal ideas.   All other systems reviewed and are negative.    A complete review of systems was performed with pertinent positives and negatives noted in the HPI, and all other systems negative.    Physical Exam   BP: (!) 160/91  Pulse: 56  Temp: 98.4  F (36.9  C)  Resp: 14  SpO2: 100 %  Physical Exam  Vitals and nursing note reviewed.   Constitutional:       General: She is not in acute distress.     Appearance: She is well-developed. She is not ill-appearing.   HENT:      Head: Normocephalic and atraumatic.      Right Ear: External ear normal.      Left Ear: External ear normal.      Nose: Nose normal.   Eyes:      Extraocular Movements: Extraocular movements intact.      Conjunctiva/sclera: Conjunctivae normal.   Pulmonary:      Effort: Pulmonary effort is normal. No respiratory distress.   Abdominal:      General: There is no distension.    Musculoskeletal:         General: No swelling or deformity.      Cervical back: Normal range of motion and neck supple.   Skin:     General: Skin is warm and dry.   Neurological:      Mental Status: Mental status is at baseline.      Comments: Alert, oriented   Psychiatric:         Mood and Affect: Mood normal.         Behavior: Behavior normal.         ED Course     1:47 AM  The patient was seen and examined by Rik Christopher DO in Room ED08.     Procedures                No results found for any visits on 07/21/22.  Medications   buprenorphine HCl-naloxone HCl (SUBOXONE) 8-2 MG per film 1 Film (1 Film Sublingual Given 7/21/22 0312)        Assessments & Plan (with Medical Decision Making)   41-year-old female presents with chief complaint requesting Suboxone.  She has not been able to get her prescription at the pharmacy.  She is currently under care of the recovery clinic.  Patient denies other complaints.  She was given a dose of her Suboxone here.  She will follow-up with the pharmacy in her clinic for further treatment.    I have reviewed the nursing notes. I have reviewed the findings, diagnosis, plan and need for follow up with the patient.    Discharge Medication List as of 7/21/2022  3:04 AM          Final diagnoses:   Continuous opioid dependence (H)     I, Keyona Briggs, am serving as a trained medical scribe to document services personally performed by iRk Christopher DO based on the provider's statements to me on July 21, 2022.  This document has been checked and approved by the attending provider.    IRik DO, was physically present and have reviewed and verified the accuracy of this note documented by Keyona Briggs medical scribe.      Rik Christopher DO      --    [facility] EMERGENCY DEPARTMENT  7/20/2022     Rik Christopher DO  07/21/22 [id number]

## 2022-07-21 NOTE — DISCHARGE INSTRUCTIONS
Please follow-up with the recovery clinic and follow-up at the pharmacy to get your Suboxone prescription filled.

## 2022-10-22 ENCOUNTER — HEALTH MAINTENANCE LETTER (OUTPATIENT)
Age: 42
End: 2022-10-22

## 2023-04-01 ENCOUNTER — HEALTH MAINTENANCE LETTER (OUTPATIENT)
Age: 43
End: 2023-04-01

## 2024-06-02 ENCOUNTER — HEALTH MAINTENANCE LETTER (OUTPATIENT)
Age: 44
End: 2024-06-02

## 2024-10-13 NOTE — PROGRESS NOTES
SPIRITUAL HEALTH SERVICES  SPIRITUAL ASSESSMENT Progress Note  [facility] ([facility]) Station 30     REFERRAL SOURCE: I did visit this morning patient Elaina per [facility]  referral. I di introduced myself as the unit  and shared all the info pt needs to know about the [facility]. Pt appreciated my presence but not ready for further discussion. I told her that I am always available for one to one  visit whenever she is ready and she agreed on that.    PLAN: I will remain open to provide spiritual care for the pt as needed.    Sherine Álvarez M.Div. ([place]), M.Th., D.Min., [facility]  Staff   Pager [phone]    Resident

## 2025-03-22 ENCOUNTER — HEALTH MAINTENANCE LETTER (OUTPATIENT)
Age: 45
End: 2025-03-22

## 2025-06-14 ENCOUNTER — HEALTH MAINTENANCE LETTER (OUTPATIENT)
Age: 45
End: 2025-06-14